# Patient Record
Sex: FEMALE | Race: WHITE | NOT HISPANIC OR LATINO | Employment: OTHER | RURAL
[De-identification: names, ages, dates, MRNs, and addresses within clinical notes are randomized per-mention and may not be internally consistent; named-entity substitution may affect disease eponyms.]

---

## 2017-02-23 ENCOUNTER — OFFICE VISIT (OUTPATIENT)
Dept: CARDIOLOGY | Facility: CLINIC | Age: 72
End: 2017-02-23

## 2017-02-23 VITALS
HEART RATE: 58 BPM | BODY MASS INDEX: 27.14 KG/M2 | WEIGHT: 159 LBS | DIASTOLIC BLOOD PRESSURE: 82 MMHG | HEIGHT: 64 IN | SYSTOLIC BLOOD PRESSURE: 150 MMHG | RESPIRATION RATE: 18 BRPM

## 2017-02-23 DIAGNOSIS — I10 ESSENTIAL HYPERTENSION: Primary | ICD-10-CM

## 2017-02-23 PROCEDURE — 99213 OFFICE O/P EST LOW 20 MIN: CPT | Performed by: INTERNAL MEDICINE

## 2017-02-23 RX ORDER — VITAMIN B COMPLEX
1 CAPSULE ORAL DAILY
COMMUNITY
End: 2020-07-09

## 2017-02-23 RX ORDER — CLONIDINE HYDROCHLORIDE 0.1 MG/1
0.1 TABLET ORAL 3 TIMES DAILY PRN
Qty: 30 TABLET | Refills: 11 | Status: SHIPPED | OUTPATIENT
Start: 2017-02-23 | End: 2019-09-20 | Stop reason: SINTOL

## 2017-02-23 NOTE — PROGRESS NOTES
Subjective:     Encounter Date:02/23/2017      Patient ID: Divine Banuelos is a 71 y.o. female.     PROBLEM LIST:  1. Hypertension:  a. History of elevated blood pressure due to stress.  b. Discontinuation of enalapril secondary to hypotension, June 2013.  2. Hypothyroidism.  3. Depression.  4. Anxiety.  5. Osteoporosis, mild.  6. Mild dyslipidemia.  7. History of Bell’s palsy.  8. Surgical history:  a. Bilateral cataract extraction.     No Known Allergies  Chief Complaint: Routine follow-up for hypertension  HPI  This is a 71-year-old female patient with a previous history of hypertension.  The patient indicates that she stop taking enalapril several years ago due to symptomatic low blood pressure.  I have reviewed her home blood pressure recordings since September 2016 to the present.  During that period of time she has had 3 episodes of systolic blood pressure greater than 140 and no episodes of diastolic blood pressure greater than 90.  She has no chest pain or shortness of breath.  There is no exertional chest arm neck jaw shoulder or back discomfort.  There is no orthopnea PND or lower extremity edema.  There is no dizziness palpitations or syncope.  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  She has no symptoms that she would attribute to her elevated blood pressure such as headache or visual changes or confusion.  She is a nonsmoker.  The remainder of her past medical history is unchanged compared to her last visit.  There has been no changes in her family history or social history.  The following portions of the patient's history were reviewed and updated as appropriate: allergies, current medications, past family history, past medical history, past social history, past surgical history and problem  Review of Systems   Constitution: Negative for chills, diaphoresis, fever, weakness, malaise/fatigue, night sweats, weight gain and weight loss.   HENT: Negative for ear  discharge, hearing loss, hoarse voice and nosebleeds.    Eyes: Negative for discharge, double vision, pain and photophobia.   Cardiovascular: Negative for chest pain, claudication, cyanosis, dyspnea on exertion, irregular heartbeat, leg swelling, near-syncope, orthopnea, palpitations, paroxysmal nocturnal dyspnea and syncope.   Respiratory: Negative for cough, hemoptysis, shortness of breath, sputum production and wheezing.    Endocrine: Negative for cold intolerance, heat intolerance, polydipsia, polyphagia and polyuria.   Hematologic/Lymphatic: Negative for adenopathy and bleeding problem. Does not bruise/bleed easily.   Skin: Negative for color change, flushing, itching and rash.   Musculoskeletal: Negative for muscle cramps, muscle weakness, myalgias and stiffness.   Gastrointestinal: Negative for abdominal pain, diarrhea, hematemesis, hematochezia, nausea and vomiting.   Genitourinary: Negative for dysuria, frequency and nocturia.   Neurological: Negative for focal weakness, loss of balance, numbness, paresthesias and seizures.   Psychiatric/Behavioral: Negative for altered mental status, hallucinations and suicidal ideas.   Allergic/Immunologic: Negative for HIV exposure, hives and persistent infections.           Current Outpatient Prescriptions:   •  aspirin 81 MG EC tablet, Take 81 mg by mouth daily., Disp: , Rfl:   •  B Complex Vitamins (VITAMIN B COMPLEX) capsule capsule, Take 1 capsule by mouth Daily., Disp: , Rfl:   •  Calcium Citrate (CITRACAL PO), Take  by mouth daily. + Magnesium, Disp: , Rfl:   •  Garlic 100 MG tablet, Take 1 tablet by mouth Daily., Disp: , Rfl:   •  levothyroxine (SYNTHROID, LEVOTHROID) 25 MCG tablet, Take 25 mcg by mouth daily., Disp: , Rfl:   •  meloxicam (MOBIC) 15 MG tablet, Take 15 mg by mouth as needed., Disp: , Rfl:   •  Misc Natural Products (OSTEO BI-FLEX TRIPLE STRENGTH PO), Take  by mouth daily., Disp: , Rfl:   •  Omega-3 Fatty Acids (FISH OIL) 1200 MG capsule capsule,  "Take 1,200 mg by mouth 2 (two) times a day., Disp: , Rfl:   •  PARoxetine (PAXIL) 10 MG tablet, Take 10 mg by mouth every morning., Disp: , Rfl:   •  TURMERIC PO, Take 1 tablet by mouth Daily., Disp: , Rfl:      Objective:     Physical Exam   Constitutional: She is oriented to person, place, and time. She appears well-developed and well-nourished.   HENT:   Head: Normocephalic and atraumatic.   Eyes: Conjunctivae are normal. No scleral icterus.   Cardiovascular: Normal rate, regular rhythm, normal heart sounds and intact distal pulses.  Exam reveals no gallop and no friction rub.    No murmur heard.  Pulmonary/Chest: Effort normal and breath sounds normal. No respiratory distress.   Abdominal: Soft. Bowel sounds are normal. There is no tenderness.   Musculoskeletal: She exhibits no edema.   Neurological: She is alert and oriented to person, place, and time.   Skin: Skin is warm and dry.   Psychiatric: She has a normal mood and affect. Her behavior is normal.     Blood pressure 150/82, pulse 58, resp. rate 18, height 64\" (162.6 cm), weight 159 lb (72.1 kg).   Lab Review:       Assessment:         1. Essential hypertension  Overall her blood pressure control is reasonably good without specific antihypertensive therapy.    Procedures     Plan:       I have recommended a strategy of keeping some blood pressure medication on hand to take for systolic blood pressures greater than 150 and her diastolic blood pressures greater than 90.  We will give her clonidine 0.1 mg every 8 hours as needed for these parameters.  She has been counseled regarding the importance of dietary sodium restriction as well as regular aerobic activity.  No cardiovascular testing is indicated at this time.         "

## 2017-03-23 ENCOUNTER — OFFICE VISIT (OUTPATIENT)
Dept: SURGERY | Facility: CLINIC | Age: 72
End: 2017-03-23

## 2017-03-23 VITALS
BODY MASS INDEX: 28.42 KG/M2 | TEMPERATURE: 98.2 F | HEIGHT: 63 IN | DIASTOLIC BLOOD PRESSURE: 78 MMHG | SYSTOLIC BLOOD PRESSURE: 126 MMHG | WEIGHT: 160.4 LBS

## 2017-03-23 DIAGNOSIS — K64.1 SECOND DEGREE HEMORRHOIDS: ICD-10-CM

## 2017-03-23 DIAGNOSIS — K62.5 RECTAL BLEED: Primary | ICD-10-CM

## 2017-03-23 PROCEDURE — 99204 OFFICE O/P NEW MOD 45 MIN: CPT | Performed by: SURGERY

## 2017-03-23 NOTE — PROGRESS NOTES
Referring Provider: No ref. provider found    Reason for Consultation: Rectal bleed     Patient Care Team:  GILBERT Lockett as PCP - General (Family Medicine)    Chief complaint   Chief Complaint   Patient presents with   • Rectal Bleeding     Pt c/o occasional rectal bleeding. No abdominal pain or constipation. Her last colon was done in 2014       Subjective .     History of present illness:  Patient presents with rectal bleeding, bright red in nature. Also felt several small hemorrhoids recently. Patient on stool softeners, a high fiber diet and increase water intake. Her last colonoscopy was 2-1/2 years ago. No significant findings at that time. No upper abdominal pain, no anemia and no weight loss. No family history of colon cancer.    Review of Systems   Constitutional: Negative for chills, fever and unexpected weight change.   HENT: Negative for hearing loss, trouble swallowing and voice change.    Eyes: Negative for visual disturbance.   Respiratory: Negative for apnea, cough, chest tightness, shortness of breath and wheezing.    Cardiovascular: Negative for chest pain, palpitations and leg swelling.   Gastrointestinal: Positive for anal bleeding. Negative for abdominal distention, abdominal pain, blood in stool, constipation, diarrhea, nausea, rectal pain and vomiting.   Endocrine: Negative for cold intolerance and heat intolerance.   Genitourinary: Negative for difficulty urinating, dysuria and flank pain.   Musculoskeletal: Negative for back pain and gait problem.   Skin: Negative for color change, rash and wound.   Neurological: Negative for dizziness, syncope, speech difficulty, weakness, light-headedness, numbness and headaches.   Hematological: Negative for adenopathy. Does not bruise/bleed easily.   Psychiatric/Behavioral: Negative for confusion. The patient is not nervous/anxious.        History  Past Medical History:   Diagnosis Date   • Anxiety    • Bell's palsy    • Depression    •  "Hyperlipidemia    • Hypertension    • Hypothyroidism    • Osteoporosis        Past Surgical History:   Procedure Laterality Date   • CATARACT EXTRACTION Bilateral    • CATARACT EXTRACTION, BILATERAL         Family History   Problem Relation Age of Onset   • Cancer Mother      skin   • Hypertension Mother    • Cancer Father      colon       Social History   Substance Use Topics   • Smoking status: Never Smoker   • Smokeless tobacco: None   • Alcohol use No        Objective     Vital Signs   /78  Temp 98.2 °F (36.8 °C)  Ht 63\" (160 cm)  Wt 160 lb 6.4 oz (72.8 kg)  BMI 28.41 kg/m2    Physical Exam:     General Appearance:    Alert, cooperative, in no acute distress   Head:    Normocephalic, without obvious abnormality, atraumatic   Eyes:            Lids and lashes normal, conjunctivae and sclerae normal, no   icterus, no pallor, corneas clear, PERRLA   Ears:    Ears appear intact with no abnormalities noted   Throat:   No oral lesions, no thrush, oral mucosa moist   Neck:   No adenopathy, supple, trachea midline, no thyromegaly, no   carotid bruit, no JVD   Back:     No kyphosis present, no scoliosis present, no skin lesions,      erythema or scars, no tenderness to percussion or                   palpation,   range of motion normal   Lungs:     Clear to auscultation,respirations regular, even and                  unlabored    Heart:    Regular rhythm and normal rate, normal S1 and S2, no            murmur, no gallop, no rub, no click   Chest Wall:    No abnormalities observed   Abdomen:     Normal bowel sounds, no masses, no organomegaly, soft        non-tender, non-distended, no guarding, no rebound                tenderness   Extremities:   Moves all extremities well, no edema, no cyanosis, no             redness   Pulses:   Pulses palpable and equal bilaterally   Skin:   No bleeding, bruising or rash   Lymph nodes:   No palpable adenopathy   Neurologic:   Cranial nerves 2 - 12 grossly intact, sensation " intact, DTR       present and equal bilaterally  Rectal-small internal hemorrhoids, no prolapse and no bleeding        Results Review:   I reviewed the patient's new clinical results.      Assessment/Plan     Patient Active Problem List   Diagnosis   • Hypertension   • Hypothyroidism   • Depression   • Anxiety   • Osteoporosis,mild   • mild Dyslipidemia   • Bell's palsy       1. Rectal bleed    2. Second degree hemorrhoids        Patient reassured, follow up in 2 years for colonoscopy. Continue to use stool softeners and a high fiber diet. Follow-up as needed otherwise    I discussed the patients findings and my recommendations with patient    Stuart Caceres MD  03/23/17      .

## 2017-03-27 ENCOUNTER — OFFICE VISIT (OUTPATIENT)
Dept: OBSTETRICS AND GYNECOLOGY | Facility: CLINIC | Age: 72
End: 2017-03-27

## 2017-03-27 VITALS
WEIGHT: 158 LBS | DIASTOLIC BLOOD PRESSURE: 54 MMHG | HEIGHT: 64 IN | SYSTOLIC BLOOD PRESSURE: 106 MMHG | BODY MASS INDEX: 26.98 KG/M2

## 2017-03-27 DIAGNOSIS — Z12.39 SCREENING FOR BREAST CANCER: Primary | ICD-10-CM

## 2017-03-27 DIAGNOSIS — N81.11 MIDLINE CYSTOCELE: ICD-10-CM

## 2017-03-27 PROCEDURE — 57160 INSERT PESSARY/OTHER DEVICE: CPT | Performed by: PHYSICIAN ASSISTANT

## 2017-03-27 PROCEDURE — 99213 OFFICE O/P EST LOW 20 MIN: CPT | Performed by: PHYSICIAN ASSISTANT

## 2017-03-27 NOTE — PROGRESS NOTES
"Subjective   Chief Complaint   Patient presents with   • Follow-up     order for mammogram   • Pessary Check     size 2     /54  Ht 63.5\" (161.3 cm)  Wt 158 lb (71.7 kg)  LMP  (LMP Unknown)  BMI 27.55 kg/m2   Divine Banuelos is a 71 y.o. year old  presenting to be seen for pessary issues  She was seen initially one year ago and fitted per Dr Kennedy with size 2 ring with support--she states initially she felt it did pretty well but did seem to come down a lot--she wore it off and on until she removed 2 months ago because it seemed to stay down low all the time and was uncomfortable  She desires a different pessary now--wants a size larger--  She is voiding well  Requests order for mammogram as well    Past Medical History:   Diagnosis Date   • Anxiety    • Arthritis    • Bell's palsy    • Depression    • Hyperlipidemia    • Hypertension    • Hypothyroidism    • Osteoporosis         Current Outpatient Prescriptions:   •  aspirin 81 MG EC tablet, Take 81 mg by mouth daily., Disp: , Rfl:   •  B Complex Vitamins (VITAMIN B COMPLEX) capsule capsule, Take 1 capsule by mouth Daily., Disp: , Rfl:   •  Calcium Citrate (CITRACAL PO), Take  by mouth daily. + Magnesium, Disp: , Rfl:   •  Garlic 100 MG tablet, Take 1 tablet by mouth Daily., Disp: , Rfl:   •  levothyroxine (SYNTHROID, LEVOTHROID) 25 MCG tablet, Take 25 mcg by mouth daily., Disp: , Rfl:   •  meloxicam (MOBIC) 15 MG tablet, Take 15 mg by mouth as needed., Disp: , Rfl:   •  Misc Natural Products (OSTEO BI-FLEX TRIPLE STRENGTH PO), Take  by mouth daily., Disp: , Rfl:   •  Omega-3 Fatty Acids (FISH OIL) 1200 MG capsule capsule, Take 1,200 mg by mouth 2 (two) times a day., Disp: , Rfl:   •  PARoxetine (PAXIL) 10 MG tablet, Take 10 mg by mouth every morning., Disp: , Rfl:   •  TURMERIC PO, Take 1 tablet by mouth Daily., Disp: , Rfl:   •  CloNIDine (CATAPRES) 0.1 MG tablet, Take 1 tablet by mouth 3 (Three) Times a Day As Needed for high blood pressure " (SBP> 150 and/or DBP>90)., Disp: 30 tablet, Rfl: 11  •  Misc. Devices (GELLHORN PESSARY) misc, Use as directed (Size 3), Disp: 1 each, Rfl: 0   No Known Allergies   Past Surgical History:   Procedure Laterality Date   • CATARACT EXTRACTION Bilateral    • CATARACT EXTRACTION, BILATERAL     • RETINAL DETACHMENT REPAIR        Social History     Social History   • Marital status:      Spouse name: N/A   • Number of children: N/A   • Years of education: N/A     Occupational History   • Not on file.     Social History Main Topics   • Smoking status: Never Smoker   • Smokeless tobacco: Not on file   • Alcohol use No   • Drug use: No   • Sexual activity: Defer     Other Topics Concern   • Not on file     Social History Narrative      Family History   Problem Relation Age of Onset   • Cancer Mother      skin   • Hypertension Mother    • Cancer Father      colon   • Colon cancer Paternal Grandmother    • Colon cancer Paternal Uncle        The following portions of the patient's history were reviewed and updated as appropriate:problem list, current medications, allergies, past family history, past medical history, past social history and past surgical history.    Review of Systems   Constitutional: Negative.    HENT: Positive for hearing loss.    Gastrointestinal: Negative.    Genitourinary: Negative.  Negative for difficulty urinating, pelvic pain and vaginal bleeding.        Objective     Physical Exam   Constitutional: She appears well-developed and well-nourished.   Abdominal: Soft. Normal appearance. She exhibits no distension and no mass. There is no tenderness.   Genitourinary: Uterus normal. There is no tenderness or lesion on the right labia. There is no tenderness or lesion on the left labia. Cervix exhibits no motion tenderness and no discharge. Right adnexum displays no mass and no tenderness. Left adnexum displays no mass and no tenderness. No erythema, tenderness or bleeding in the vagina. No vaginal  discharge found.   Genitourinary Comments: 2 plus cystocele   Skin: Skin is warm, dry and intact.   Psychiatric: She has a normal mood and affect. Her behavior is normal.     Divine was seen today for follow-up and pessary check.    Diagnoses and all orders for this visit:    Screening for breast cancer  -     Mammo Screening Bilateral With CAD    Midline cystocele             This note was electronically signed.    Denise Hernandez PA-C   March 27, 2017

## 2017-03-27 NOTE — PATIENT INSTRUCTIONS
rx for ring pessary with support size 3  rto for insertion if desires  If she wants to insert pessary at home that is ok and will follow up 2 months or prn

## 2017-03-28 NOTE — PROGRESS NOTES
I have reviewed the notes, assessments, and/or procedures performed by Sonali Hernandez PA-C, I concur with her/his documentation of Divine Banuelos.

## 2017-04-04 ENCOUNTER — HOSPITAL ENCOUNTER (OUTPATIENT)
Dept: GENERAL RADIOLOGY | Age: 72
Discharge: HOME | End: 2017-04-04
Attending: PHYSICIAN ASSISTANT | Admitting: PHYSICIAN ASSISTANT

## 2017-04-04 DIAGNOSIS — Z12.31 ENCOUNTER FOR SCREENING MAMMOGRAM FOR BREAST CANCER: ICD-10-CM

## 2017-06-07 ENCOUNTER — OFFICE VISIT (OUTPATIENT)
Dept: NEUROLOGY | Facility: CLINIC | Age: 72
End: 2017-06-07

## 2017-06-07 VITALS
HEIGHT: 63 IN | WEIGHT: 155 LBS | SYSTOLIC BLOOD PRESSURE: 141 MMHG | OXYGEN SATURATION: 99 % | DIASTOLIC BLOOD PRESSURE: 68 MMHG | HEART RATE: 59 BPM | BODY MASS INDEX: 27.46 KG/M2

## 2017-06-07 DIAGNOSIS — G44.209 TENSION HEADACHE: ICD-10-CM

## 2017-06-07 DIAGNOSIS — M54.2 NECK PAIN: ICD-10-CM

## 2017-06-07 DIAGNOSIS — F41.9 ANXIETY: Primary | ICD-10-CM

## 2017-06-07 DIAGNOSIS — R20.2 PARESTHESIA: ICD-10-CM

## 2017-06-07 PROCEDURE — 99204 OFFICE O/P NEW MOD 45 MIN: CPT | Performed by: PSYCHIATRY & NEUROLOGY

## 2017-06-07 RX ORDER — PAROXETINE HYDROCHLORIDE 20 MG/1
20 TABLET, FILM COATED ORAL EVERY MORNING
Qty: 30 TABLET | Refills: 5 | Status: SHIPPED | OUTPATIENT
Start: 2017-06-07 | End: 2017-06-26 | Stop reason: SDUPTHER
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
## 2017-06-26 DIAGNOSIS — F41.9 ANXIETY: ICD-10-CM

## 2017-06-26 RX ORDER — PAROXETINE HYDROCHLORIDE 20 MG/1
20 TABLET, FILM COATED ORAL EVERY MORNING
Qty: 30 TABLET | Refills: 5 | Status: SHIPPED | OUTPATIENT
Start: 2017-06-26 | End: 2017-10-09 | Stop reason: SDUPTHER

## 2017-07-21 ENCOUNTER — HOSPITAL ENCOUNTER (OUTPATIENT)
Dept: MRI IMAGING | Age: 72
Discharge: HOME | End: 2017-07-21
Attending: PSYCHIATRY & NEUROLOGY | Admitting: PSYCHIATRY & NEUROLOGY

## 2017-07-21 DIAGNOSIS — G44.209 TENSION-TYPE HEADACHE, NOT INTRACTABLE: ICD-10-CM

## 2017-07-21 DIAGNOSIS — G44.209 TENSION HEADACHE: ICD-10-CM

## 2017-07-21 DIAGNOSIS — R20.2 PARESTHESIA: ICD-10-CM

## 2017-07-28 ENCOUNTER — TELEPHONE (OUTPATIENT)
Dept: NEUROLOGY | Facility: CLINIC | Age: 72
End: 2017-07-28

## 2017-10-09 ENCOUNTER — OFFICE VISIT (OUTPATIENT)
Dept: NEUROLOGY | Facility: CLINIC | Age: 72
End: 2017-10-09

## 2017-10-09 VITALS
HEART RATE: 53 BPM | BODY MASS INDEX: 27.64 KG/M2 | DIASTOLIC BLOOD PRESSURE: 64 MMHG | SYSTOLIC BLOOD PRESSURE: 128 MMHG | HEIGHT: 63 IN | WEIGHT: 156 LBS

## 2017-10-09 DIAGNOSIS — M54.2 NECK PAIN: Primary | ICD-10-CM

## 2017-10-09 DIAGNOSIS — F41.9 ANXIETY: ICD-10-CM

## 2017-10-09 PROCEDURE — 99213 OFFICE O/P EST LOW 20 MIN: CPT | Performed by: PSYCHIATRY & NEUROLOGY

## 2017-10-09 RX ORDER — MELOXICAM 15 MG/1
15 TABLET ORAL AS NEEDED
Qty: 30 TABLET | Refills: 11 | Status: SHIPPED | OUTPATIENT
Start: 2017-10-09 | End: 2019-03-05 | Stop reason: SDUPTHER

## 2017-10-09 RX ORDER — PAROXETINE HYDROCHLORIDE 20 MG/1
20 TABLET, FILM COATED ORAL EVERY MORNING
Qty: 90 TABLET | Refills: 3 | Status: SHIPPED | OUTPATIENT
Start: 2017-10-09 | End: 2018-11-07 | Stop reason: SDUPTHER

## 2017-10-09 NOTE — PROGRESS NOTES
Subjective:     Patient ID: Divine Banuelos is a 71 y.o. female.    CC: No chief complaint on file.      HPI:   History of Present Illness  The following portions of the patient's history were reviewed and updated as appropriate: allergies, current medications, past family history, past medical history, past social history, past surgical history and problem list.     Numbness and tension headaches have improved, has occasional scalp tenderness, neck popping. She feels better since she has improved with Paxil. She gets chiropractic adjustments for neck pain. MRI of brain was normal for age. 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    Past Medical History:   Diagnosis Date   • Anxiety    • Arthritis    • Bell's palsy    • Depression    • Hyperlipidemia    • Hypertension    • Hypothyroidism    • Osteoporosis        Past Surgical History:   Procedure Laterality Date   • CATARACT EXTRACTION Bilateral    • CATARACT EXTRACTION, BILATERAL     • RETINAL DETACHMENT REPAIR         Social History     Social History   • Marital status:      Spouse name: N/A   • Number of children: N/A   • Years of education: N/A     Occupational History   • Not on file.     Social History Main Topics   • Smoking status: Never Smoker   • Smokeless tobacco: Not on file   • Alcohol use No   • Drug use: No   • Sexual activity: Defer     Other Topics Concern   • Not on file     Social History Narrative       Family History   Problem Relation Age of Onset   • Cancer Mother      skin   • Hypertension Mother    • Cancer Father      colon   • Colon cancer Paternal Grandmother    • Colon cancer Paternal Uncle         Review of Systems   Constitutional: Negative for chills, fatigue, fever and unexpected weight change.   HENT: Negative for ear pain, hearing loss, nosebleeds, rhinorrhea and sore throat.    Eyes: Positive for visual disturbance. Negative for photophobia, pain, discharge  and itching.   Respiratory: Negative for cough, chest tightness, shortness of breath and wheezing.    Cardiovascular: Negative for chest pain, palpitations and leg swelling.   Gastrointestinal: Negative for abdominal pain, blood in stool, constipation, diarrhea, nausea and vomiting.   Genitourinary: Negative for dysuria, frequency, hematuria and urgency.   Musculoskeletal: Positive for back pain, myalgias and neck stiffness. Negative for arthralgias, gait problem, joint swelling and neck pain.   Skin: Negative for rash and wound.   Allergic/Immunologic: Negative for environmental allergies and food allergies.   Neurological: Negative for dizziness, tremors, seizures, syncope, speech difficulty, weakness, light-headedness, numbness and headaches.   Hematological: Negative for adenopathy. Does not bruise/bleed easily.   Psychiatric/Behavioral: Positive for agitation, decreased concentration and sleep disturbance. Negative for confusion, hallucinations and suicidal ideas. The patient is nervous/anxious.         Objective:    Neurologic Exam     Mental Status   Oriented to person, place, and time.       Physical Exam   Constitutional: She is oriented to person, place, and time. She appears well-developed and well-nourished.   Cardiovascular: Normal rate and regular rhythm.    Pulmonary/Chest: Effort normal.   Neurological: She is alert and oriented to person, place, and time. She has normal reflexes.   Psychiatric: She has a normal mood and affect. Her behavior is normal. Thought content normal.       Assessment/Plan:       Diagnoses and all orders for this visit:    Neck pain  -     meloxicam (MOBIC) 15 MG tablet; Take 1 tablet by mouth As Needed for Moderate Pain .    Anxiety  -     PARoxetine (PAXIL) 20 MG tablet; Take 1 tablet by mouth Every Morning.       She is encouraged to call for problems, new concerns.    Shawn Juárez MD  10/9/2017

## 2017-10-12 ENCOUNTER — HOSPITAL ENCOUNTER (OUTPATIENT)
Dept: OTHER | Age: 72
Discharge: HOME | End: 2017-10-12
Attending: INTERNAL MEDICINE | Admitting: INTERNAL MEDICINE

## 2017-10-12 ENCOUNTER — OFFICE VISIT (OUTPATIENT)
Dept: CARDIOLOGY | Facility: CLINIC | Age: 72
End: 2017-10-12

## 2017-10-12 VITALS
BODY MASS INDEX: 28.01 KG/M2 | SYSTOLIC BLOOD PRESSURE: 138 MMHG | HEART RATE: 56 BPM | DIASTOLIC BLOOD PRESSURE: 70 MMHG | WEIGHT: 158.1 LBS | HEIGHT: 63 IN

## 2017-10-12 DIAGNOSIS — R00.1 BRADYCARDIA: ICD-10-CM

## 2017-10-12 DIAGNOSIS — I10 ESSENTIAL HYPERTENSION: Primary | ICD-10-CM

## 2017-10-12 PROCEDURE — 93000 ELECTROCARDIOGRAM COMPLETE: CPT | Performed by: INTERNAL MEDICINE

## 2017-10-12 PROCEDURE — 99213 OFFICE O/P EST LOW 20 MIN: CPT | Performed by: INTERNAL MEDICINE

## 2017-10-12 NOTE — PROGRESS NOTES
"[facility]  Office visit  Divine Banuelos  1945  [phone]    VISIT DATE:  10/12/2017    PCP: Queenie Amezcua, APRN  [address] [facility] [address]    CC:  Chief Complaint   Patient presents with   • Hypertension       PROBLEM LIST:  1. Hypertension:  a.  History of elevated blood pressure due to stress.  b. Discontinuation of enalapril secondary to hypotension, June 2013.  2.  Hypothyroidism.  3. Depression.  4. Anxiety.  5. Osteoporosis, mild.  6. Mild dyslipidemia.  7. History of Bell’s palsy.  8. Surgical history:  a. Bilateral cataract extraction.    ASSESSMENT:   Diagnosis Plan   1. Essential hypertension     2. Bradycardia         PLAN:  Continue as needed clonidine for blood pressures greater than 150/100 mmHg  Holter monitor to evaluate for symptom rhythm correlation with episodes of atypical lightheadedness    Subjective  Blood pressures are predominantly running less than 140/90 mmHg.  She is not required any as needed clonidine.  Denies chest pain, palpitations or dyspnea.  Does report intermittent fuzzy headed sensation and shooting right-sided pains in her head.  She has undergone an MRI which revealed only focal scattered white matter disease.  Appears to be an active individual without any limitations.  Heart rates at home running in the 44-55 be permitted range.    PHYSICAL EXAMINATION:  Vitals:    10/12/17 1151   BP: 138/70   BP Location: Left arm   Patient Position: Sitting   Pulse: 56   Weight: 158 lb 1.6 oz (71.7 kg)   Height: 63\" (160 cm)     General Appearance:    Alert, cooperative, no distress, appears stated age   Head:    Normocephalic, without obvious abnormality, atraumatic   Eyes:    conjunctiva/corneas clear   Nose:   Nares normal, septum midline, mucosa normal, no drainage   Throat:   Lips, teeth and gums normal   Neck:   Supple, symmetrical, trachea midline, no carotid    bruit or JVD   Lungs:     Clear to auscultation bilaterally, respirations " unlabored   Chest Wall:    No tenderness or deformity    Heart:    Regular rate and rhythm, S1 and S2 normal, no murmur, rub   or gallop, normal carotid impulse bilaterally without bruit.   Abdomen:     Soft, non-tender   Extremities:   Extremities normal, atraumatic, no cyanosis or edema   Pulses:   2+ and symmetric all extremities   Skin:   Skin color, texture, turgor normal, no rashes or lesions       Diagnostic Data:    ECG 12 Lead  Date/Time: 10/12/2017 12:09 PM  Performed by: ALONSO IVEY III  Authorized by: ALONSO IVEY III   Rhythm: sinus bradycardia  Conduction: 1st degree  Clinical impression: abnormal ECG          No results found for: CHLPL, TRIG, HDL, LDLDIRECT  No results found for: GLUCOSE, BUN, CREATININE, NA, K, CL, CO2, CREATININE, BUN  No results found for: HGBA1C  No results found for: WBC, HGB, HCT, PLT    Allergies  No Known Allergies    Current Medications    Current Outpatient Prescriptions:   •  aspirin 81 MG EC tablet, Take 81 mg by mouth daily., Disp: , Rfl:   •  B Complex Vitamins (VITAMIN B COMPLEX) capsule capsule, Take 1 capsule by mouth Daily., Disp: , Rfl:   •  Calcium Citrate (CITRACAL PO), Take  by mouth daily. + Magnesium, Disp: , Rfl:   •  CloNIDine (CATAPRES) 0.1 MG tablet, Take 1 tablet by mouth 3 (Three) Times a Day As Needed for high blood pressure (SBP> 150 and/or DBP>90)., Disp: 30 tablet, Rfl: 11  •  Garlic 100 MG tablet, Take 1 tablet by mouth Daily., Disp: , Rfl:   •  levothyroxine (SYNTHROID, LEVOTHROID) 25 MCG tablet, Take 25 mcg by mouth daily., Disp: , Rfl:   •  meloxicam (MOBIC) 15 MG tablet, Take 1 tablet by mouth As Needed for Moderate Pain ., Disp: 30 tablet, Rfl: 11  •  Misc Natural Products (OSTEO BI-FLEX TRIPLE STRENGTH PO), Take  by mouth daily., Disp: , Rfl:   •  Misc. Devices (GELLHORN PESSARY) misc, Use as directed (Size 3), Disp: 1 each, Rfl: 0  •  Omega-3 Fatty Acids (FISH OIL) 1200 MG capsule capsule, Take 1,200 mg by mouth 2 (two) times a day., Disp:  , Rfl:   •  PARoxetine (PAXIL) 20 MG tablet, Take 1 tablet by mouth Every Morning., Disp: 90 tablet, Rfl: 3          ROS  Review of Systems   Cardiovascular: Positive for irregular heartbeat. Negative for chest pain, dyspnea on exertion and near-syncope.   Respiratory: Negative for shortness of breath.        SOCIAL HX  Social History     Social History   • Marital status:      Spouse name: N/A   • Number of children: N/A   • Years of education: N/A     Occupational History   • Not on file.     Social History Main Topics   • Smoking status: Never Smoker   • Smokeless tobacco: Never Used   • Alcohol use No   • Drug use: No   • Sexual activity: Defer     Other Topics Concern   • Not on file     Social History Narrative       FAMILY HX  Family History   Problem Relation Age of Onset   • Cancer Mother      skin   • Hypertension Mother    • Cancer Father      colon   • Colon cancer Paternal Grandmother    • Colon cancer Paternal Uncle              Reinaldo Cope III, MD, FACC

## 2017-11-25 DIAGNOSIS — F41.9 ANXIETY: ICD-10-CM

## 2017-11-27 RX ORDER — PAROXETINE HYDROCHLORIDE 20 MG/1
TABLET, FILM COATED ORAL
Qty: 30 TABLET | Refills: 5 | Status: SHIPPED | OUTPATIENT
Start: 2017-11-27 | End: 2018-08-21 | Stop reason: SDDI

## 2017-12-14 ENCOUNTER — HOSPITAL ENCOUNTER (OUTPATIENT)
Dept: GENERAL RADIOLOGY | Age: 72
Discharge: HOME | End: 2017-12-14
Attending: NURSE PRACTITIONER | Admitting: NURSE PRACTITIONER

## 2017-12-14 DIAGNOSIS — E04.9 GOITER: ICD-10-CM

## 2018-04-13 ENCOUNTER — OFFICE VISIT (OUTPATIENT)
Dept: OBSTETRICS AND GYNECOLOGY | Facility: CLINIC | Age: 73
End: 2018-04-13

## 2018-04-13 VITALS
WEIGHT: 159 LBS | DIASTOLIC BLOOD PRESSURE: 74 MMHG | BODY MASS INDEX: 28.17 KG/M2 | SYSTOLIC BLOOD PRESSURE: 132 MMHG | HEIGHT: 63 IN

## 2018-04-13 DIAGNOSIS — N81.11 CYSTOCELE, MIDLINE: ICD-10-CM

## 2018-04-13 DIAGNOSIS — Z46.89 ENCOUNTER FOR PESSARY MAINTENANCE: ICD-10-CM

## 2018-04-13 DIAGNOSIS — R30.0 BURNING WITH URINATION: Primary | ICD-10-CM

## 2018-04-13 LAB
BILIRUB BLD-MCNC: NEGATIVE MG/DL
CLARITY, POC: ABNORMAL
COLOR UR: ABNORMAL
GLUCOSE UR STRIP-MCNC: NEGATIVE MG/DL
KETONES UR QL: NEGATIVE
LEUKOCYTE EST, POC: ABNORMAL
NITRITE UR-MCNC: NEGATIVE MG/ML
PH UR: 6 [PH] (ref 5–8)
PROT UR STRIP-MCNC: ABNORMAL MG/DL
RBC # UR STRIP: ABNORMAL /UL
SP GR UR: 1.01 (ref 1–1.03)
UROBILINOGEN UR QL: NORMAL

## 2018-04-13 PROCEDURE — 81003 URINALYSIS AUTO W/O SCOPE: CPT | Performed by: PHYSICIAN ASSISTANT

## 2018-04-13 PROCEDURE — 99213 OFFICE O/P EST LOW 20 MIN: CPT | Performed by: PHYSICIAN ASSISTANT

## 2018-04-13 RX ORDER — PHENAZOPYRIDINE HYDROCHLORIDE 100 MG/1
100 TABLET, FILM COATED ORAL 3 TIMES DAILY PRN
Qty: 15 TABLET | Refills: 0 | Status: SHIPPED | OUTPATIENT
Start: 2018-04-13 | End: 2018-08-21 | Stop reason: SDDI

## 2018-04-13 RX ORDER — SULFAMETHOXAZOLE AND TRIMETHOPRIM 800; 160 MG/1; MG/1
1 TABLET ORAL 2 TIMES DAILY
Qty: 6 TABLET | Refills: 0 | Status: SHIPPED | OUTPATIENT
Start: 2018-04-13 | End: 2018-08-21

## 2018-04-13 NOTE — PROGRESS NOTES
Subjective   Chief Complaint   Patient presents with   • Follow-up     Needs pessary put back in; burning with urination       Divine Banuelos is a 72 y.o. year old  presenting to be seen for complaints of dysuria and painful urination that started 3-4 days ago  Patient took her pessary out about one month ago and left out but she desires reinsertion today  She normally has been removing and reinserting pessary with no problems but states was more difficult to get in last time and wants me to insert today  No CVA pain, no fever or chills  UA noted    Past Medical History:   Diagnosis Date   • Anxiety    • Arthritis    • Bell's palsy    • Depression    • Hyperlipidemia    • Hypertension    • Hypothyroidism    • Osteoporosis         Current Outpatient Prescriptions:   •  aspirin 81 MG EC tablet, Take 81 mg by mouth daily., Disp: , Rfl:   •  B Complex Vitamins (VITAMIN B COMPLEX) capsule capsule, Take 1 capsule by mouth Daily., Disp: , Rfl:   •  Calcium Citrate (CITRACAL PO), Take  by mouth daily. + Magnesium, Disp: , Rfl:   •  CloNIDine (CATAPRES) 0.1 MG tablet, Take 1 tablet by mouth 3 (Three) Times a Day As Needed for high blood pressure (SBP> 150 and/or DBP>90)., Disp: 30 tablet, Rfl: 11  •  Garlic 100 MG tablet, Take 1 tablet by mouth Daily., Disp: , Rfl:   •  levothyroxine (SYNTHROID, LEVOTHROID) 25 MCG tablet, Take 25 mcg by mouth daily., Disp: , Rfl:   •  meloxicam (MOBIC) 15 MG tablet, Take 1 tablet by mouth As Needed for Moderate Pain ., Disp: 30 tablet, Rfl: 11  •  Misc Natural Products (OSTEO BI-FLEX TRIPLE STRENGTH PO), Take  by mouth daily., Disp: , Rfl:   •  Misc. Devices (GELLHORN PESSARY) misc, Use as directed (Size 3), Disp: 1 each, Rfl: 0  •  Omega-3 Fatty Acids (FISH OIL) 1200 MG capsule capsule, Take 1,200 mg by mouth 2 (two) times a day., Disp: , Rfl:   •  PARoxetine (PAXIL) 20 MG tablet, Take 1 tablet by mouth Every Morning., Disp: 90 tablet, Rfl: 3  •  PARoxetine (PAXIL) 20 MG tablet, TAKE  "ONE TABLET BY MOUTH ONCE DAILY IN THE MORNING, Disp: 30 tablet, Rfl: 5  •  phenazopyridine (PYRIDIUM) 100 MG tablet, Take 1 tablet by mouth 3 (Three) Times a Day As Needed for bladder spasms., Disp: 15 tablet, Rfl: 0  •  sulfamethoxazole-trimethoprim (BACTRIM DS) 800-160 MG per tablet, Take 1 tablet by mouth 2 (Two) Times a Day., Disp: 6 tablet, Rfl: 0   No Known Allergies   Past Surgical History:   Procedure Laterality Date   • CATARACT EXTRACTION Bilateral    • CATARACT EXTRACTION, BILATERAL     • RETINAL DETACHMENT REPAIR        Social History     Social History   • Marital status:      Spouse name: N/A   • Number of children: N/A   • Years of education: N/A     Occupational History   • Not on file.     Social History Main Topics   • Smoking status: Never Smoker   • Smokeless tobacco: Never Used   • Alcohol use No   • Drug use: No   • Sexual activity: Defer     Other Topics Concern   • Not on file     Social History Narrative   • No narrative on file      Family History   Problem Relation Age of Onset   • Cancer Mother      skin   • Hypertension Mother    • Cancer Father      colon   • Colon cancer Paternal Grandmother    • Colon cancer Paternal Uncle        Review of Systems   Constitutional: Negative.    Gastrointestinal: Negative.    Genitourinary: Positive for dysuria. Negative for hematuria and vaginal bleeding.           Objective   /74   Ht 160 cm (63\")   Wt 72.1 kg (159 lb)   LMP  (LMP Unknown)   Breastfeeding? No   BMI 28.17 kg/m²     Physical Exam   Constitutional: She appears well-developed and well-nourished. She is cooperative.   Abdominal: Soft. Normal appearance. There is no tenderness.   Genitourinary: There is no tenderness or lesion on the right labia. There is no tenderness or lesion on the left labia.   Genitourinary Comments: 1-2 plus cystocele  size 3 ring with support pessary reinserted   Neurological: She is alert.   Skin: Skin is warm and dry.   Psychiatric: She has a " normal mood and affect. Her behavior is normal.            Assessment and Plan  Divine was seen today for follow-up.    Diagnoses and all orders for this visit:    Burning with urination  -     POC Urinalysis Dipstick, Automated  -     Urine Culture - , Urine, Clean Catch    Cystocele, midline    Encounter for pessary maintenance    Other orders  -     sulfamethoxazole-trimethoprim (BACTRIM DS) 800-160 MG per tablet; Take 1 tablet by mouth 2 (Two) Times a Day.  -     phenazopyridine (PYRIDIUM) 100 MG tablet; Take 1 tablet by mouth 3 (Three) Times a Day As Needed for bladder spasms.      Patient Instructions   Will send urine for culture-  Start Bactrim DS today  Will contact Monday with culture results               This note was electronically signed.    Denise Hernandez PA-C   April 13, 2018

## 2018-04-18 LAB
BACTERIA UR CULT: ABNORMAL
BACTERIA UR CULT: ABNORMAL
OTHER ANTIBIOTIC SUSC ISLT: ABNORMAL

## 2018-05-16 ENCOUNTER — HOSPITAL ENCOUNTER (OUTPATIENT)
Dept: OTHER | Age: 73
Discharge: HOME | End: 2018-05-16
Attending: NURSE PRACTITIONER | Admitting: NURSE PRACTITIONER

## 2018-05-16 LAB
A/G RATIO: 1.8 (ref 0.8–2)
ALBUMIN SERPL-MCNC: 4.4 G/DL (ref 3.4–4.8)
ALP BLD-CCNC: 85 U/L (ref 25–100)
ALT SERPL-CCNC: 17 U/L (ref 4–36)
ANION GAP SERPL CALCULATED.3IONS-SCNC: 14 MMOL/L (ref 3–16)
AST SERPL-CCNC: 21 U/L (ref 8–33)
BASOPHILS ABSOLUTE: 0 K/UL (ref 0–0.1)
BASOPHILS RELATIVE PERCENT: 0.6 %
BILIRUB SERPL-MCNC: 0.4 MG/DL (ref 0.3–1.2)
BUN BLDV-MCNC: 14 MG/DL (ref 6–20)
CALCIUM SERPL-MCNC: 9.6 MG/DL (ref 8.5–10.5)
CHLORIDE BLD-SCNC: 103 MMOL/L (ref 98–107)
CO2: 25 MMOL/L (ref 20–30)
CREAT SERPL-MCNC: 0.9 MG/DL (ref 0.4–1.2)
EOSINOPHILS ABSOLUTE: 0.1 K/UL (ref 0–0.4)
EOSINOPHILS RELATIVE PERCENT: 2.6 %
FOLATE: >20 NG/ML
GFR AFRICAN AMERICAN: >59
GFR NON-AFRICAN AMERICAN: >60
GLOBULIN: 2.5 G/DL
GLUCOSE BLD-MCNC: 104 MG/DL (ref 74–106)
HCT VFR BLD CALC: 43.9 % (ref 37–47)
HEMOGLOBIN: 13.5 G/DL (ref 11.5–16.5)
IMMATURE GRANULOCYTES #: 0 K/UL
IMMATURE GRANULOCYTES %: 0.4 % (ref 0–5)
LYMPHOCYTES ABSOLUTE: 1.5 K/UL (ref 1.5–4)
LYMPHOCYTES RELATIVE PERCENT: 27.6 %
MCH RBC QN AUTO: 28.8 PG (ref 27–32)
MCHC RBC AUTO-ENTMCNC: 30.8 G/DL (ref 31–35)
MCV RBC AUTO: 93.6 FL (ref 80–100)
MONOCYTES ABSOLUTE: 0.5 K/UL (ref 0.2–0.8)
MONOCYTES RELATIVE PERCENT: 8.5 %
NEUTROPHILS ABSOLUTE: 3.3 K/UL (ref 2–7.5)
NEUTROPHILS RELATIVE PERCENT: 60.3 %
PDW BLD-RTO: 13.4 % (ref 11–16)
PLATELET # BLD: 320 K/UL (ref 150–400)
PMV BLD AUTO: 10.3 FL (ref 6–10)
POTASSIUM SERPL-SCNC: 4.4 MMOL/L (ref 3.4–5.1)
RBC # BLD: 4.69 M/UL (ref 3.8–5.8)
SODIUM BLD-SCNC: 142 MMOL/L (ref 136–145)
TOTAL PROTEIN: 6.9 G/DL (ref 6.4–8.3)
TSH SERPL DL<=0.05 MIU/L-ACNC: 5.1 UIU/ML (ref 0.35–5.5)
VITAMIN B-12: 572 PG/ML (ref 211–911)
VITAMIN D 25-HYDROXY: 42.9 (ref 32–100)
WBC # BLD: 5.4 K/UL (ref 4–11)

## 2018-05-21 ENCOUNTER — TRANSCRIBE ORDERS (OUTPATIENT)
Dept: ADMINISTRATIVE | Facility: HOSPITAL | Age: 73
End: 2018-05-21

## 2018-05-21 DIAGNOSIS — F68.8 OTHER SPECIFIED DISORDERS OF ADULT PERSONALITY AND BEHAVIOR: Primary | ICD-10-CM

## 2018-05-24 ENCOUNTER — HOSPITAL ENCOUNTER (OUTPATIENT)
Dept: MRI IMAGING | Facility: HOSPITAL | Age: 73
Discharge: HOME | End: 2018-05-24
Admitting: NURSE PRACTITIONER

## 2018-05-24 DIAGNOSIS — F68.8 OTHER SPECIFIED DISORDERS OF ADULT PERSONALITY AND BEHAVIOR: ICD-10-CM

## 2018-05-24 PROCEDURE — 70551 MRI BRAIN STEM W/O DYE: CPT

## 2018-06-12 ENCOUNTER — TRANSCRIBE ORDERS (OUTPATIENT)
Dept: MAMMOGRAPHY | Facility: HOSPITAL | Age: 73
End: 2018-06-12

## 2018-06-13 ENCOUNTER — TRANSCRIBE ORDERS (OUTPATIENT)
Dept: ADMINISTRATIVE | Facility: HOSPITAL | Age: 73
End: 2018-06-13

## 2018-06-13 DIAGNOSIS — Z12.39 SCREENING BREAST EXAMINATION: Primary | ICD-10-CM

## 2018-06-14 ENCOUNTER — TRANSCRIBE ORDERS (OUTPATIENT)
Dept: ADMINISTRATIVE | Facility: HOSPITAL | Age: 73
End: 2018-06-14

## 2018-06-14 DIAGNOSIS — N64.4 MASTODYNIA: Primary | ICD-10-CM

## 2018-06-21 ENCOUNTER — HOSPITAL ENCOUNTER (OUTPATIENT)
Dept: MAMMOGRAPHY | Facility: HOSPITAL | Age: 73
Discharge: HOME | End: 2018-06-21
Admitting: NURSE PRACTITIONER

## 2018-06-21 DIAGNOSIS — N64.4 MASTODYNIA: ICD-10-CM

## 2018-06-21 PROCEDURE — G0279 TOMOSYNTHESIS, MAMMO: HCPCS

## 2018-06-21 PROCEDURE — 77066 DX MAMMO INCL CAD BI: CPT

## 2018-07-10 ENCOUNTER — HOSPITAL ENCOUNTER (OUTPATIENT)
Dept: GENERAL RADIOLOGY | Facility: HOSPITAL | Age: 73
Discharge: HOME | End: 2018-07-10
Admitting: NURSE PRACTITIONER

## 2018-07-10 ENCOUNTER — TRANSCRIBE ORDERS (OUTPATIENT)
Dept: ADMINISTRATIVE | Facility: HOSPITAL | Age: 73
End: 2018-07-10

## 2018-07-10 ENCOUNTER — HOSPITAL ENCOUNTER (OUTPATIENT)
Dept: GENERAL RADIOLOGY | Facility: HOSPITAL | Age: 73
Discharge: HOME | End: 2018-07-10

## 2018-07-10 DIAGNOSIS — M79.672 LEFT FOOT PAIN: ICD-10-CM

## 2018-07-10 DIAGNOSIS — M79.672 LEFT FOOT PAIN: Primary | ICD-10-CM

## 2018-07-10 DIAGNOSIS — R22.42 MASS OF LOWER LEG, LEFT: ICD-10-CM

## 2018-07-10 PROCEDURE — 73600 X-RAY EXAM OF ANKLE: CPT

## 2018-07-10 PROCEDURE — 73630 X-RAY EXAM OF FOOT: CPT

## 2018-08-21 ENCOUNTER — OFFICE VISIT (OUTPATIENT)
Dept: CARDIOLOGY | Facility: CLINIC | Age: 73
End: 2018-08-21

## 2018-08-21 VITALS
SYSTOLIC BLOOD PRESSURE: 120 MMHG | HEIGHT: 63 IN | HEART RATE: 54 BPM | WEIGHT: 158.4 LBS | DIASTOLIC BLOOD PRESSURE: 80 MMHG | BODY MASS INDEX: 28.07 KG/M2

## 2018-08-21 DIAGNOSIS — I10 ESSENTIAL HYPERTENSION: Primary | ICD-10-CM

## 2018-08-21 DIAGNOSIS — R00.1 BRADYCARDIA: ICD-10-CM

## 2018-08-21 PROCEDURE — 99213 OFFICE O/P EST LOW 20 MIN: CPT | Performed by: INTERNAL MEDICINE

## 2018-08-21 NOTE — PROGRESS NOTES
"[facility]  Office visit  Divine Banuelos  1945  [phone]    VISIT DATE:  10/12/2017    PCP: Queenie Amezcua, APRN  [address] [facility] [address]    CC:  Chief Complaint   Patient presents with   • Hypertension   • Slow Heart Rate       PROBLEM LIST:  1. Hypertension:  a.  History of elevated blood pressure due to stress.  b. Discontinuation of enalapril secondary to hypotension, June 2013.  2. Bradycardia-Holter October 2017: Average heart rate 52 bpm, range of 36-94 bpm. Asymptomatic  3. Hypothyroidism.  4. Depression.  5. Anxiety.  6. Osteoporosis, mild.  7. Mild dyslipidemia.  8. History of Bell’s palsy.  9. Surgical history:  a. Bilateral cataract extraction.    ASSESSMENT:   Diagnosis Plan   1. Essential hypertension     2. Bradycardia         PLAN:  Blood pressure lability: Continue as needed clonidine for blood pressures greater than 150/100 mmHg    Sinus bradycardia: Holter monitor consistent with underlying sick sinus physiology, currently asymptomatic. Continue clinical follow-up. Currently no indication for pacing. She has not required as needed clonidine, if she does begin to require this medication which can worsen bradycardia arrhythmias I would recommend switching over to when necessary hydralazine.      Subjective  Blood pressures are predominantly running less than 140/90 mmHg.  She is not required any as needed clonidine.  Denies chest pain, palpitations or dyspnea.  Maintaining an active lifestyle, currently 80 sitting for her 6-month-old grandchild. For generalized fatigue. No further episodes of lightheadedness. Reviewed results of Holter monitor which are suggestive of underlying sick sinus physiology which currently appears asymptomatic.    PHYSICAL EXAMINATION:  Vitals:    08/21/18 1456   BP: 120/80   BP Location: Left arm   Patient Position: Sitting   Pulse: 54   Weight: 71.8 kg (158 lb 6.4 oz)   Height: 160 cm (63\")     General Appearance:    " Alert, cooperative, no distress, appears stated age   Head:    Normocephalic, without obvious abnormality, atraumatic   Eyes:    conjunctiva/corneas clear   Nose:   Nares normal, septum midline, mucosa normal, no drainage   Throat:   Lips, teeth and gums normal   Neck:   Supple, symmetrical, trachea midline, no carotid    bruit or JVD   Lungs:     Clear to auscultation bilaterally, respirations unlabored   Chest Wall:    No tenderness or deformity    Heart:    Regular rate and rhythm, S1 and S2 normal, no murmur, rub   or gallop, normal carotid impulse bilaterally without bruit.   Abdomen:     Soft, non-tender   Extremities:   Extremities normal, atraumatic, no cyanosis or edema   Pulses:   2+ and symmetric all extremities   Skin:   Skin color, texture, turgor normal, no rashes or lesions       Diagnostic Data:  Procedures  No results found for: CHLPL, TRIG, HDL, LDLDIRECT  No results found for: GLUCOSE, BUN, CREATININE, NA, K, CL, CO2, CREATININE, BUN  No results found for: HGBA1C  No results found for: WBC, HGB, HCT, PLT    Allergies  No Known Allergies    Current Medications    Current Outpatient Prescriptions:   •  aspirin 81 MG EC tablet, Take 81 mg by mouth daily., Disp: , Rfl:   •  B Complex Vitamins (VITAMIN B COMPLEX) capsule capsule, Take 1 capsule by mouth Daily., Disp: , Rfl:   •  Calcium Citrate (CITRACAL PO), Take  by mouth daily. + Magnesium, Disp: , Rfl:   •  CloNIDine (CATAPRES) 0.1 MG tablet, Take 1 tablet by mouth 3 (Three) Times a Day As Needed for high blood pressure (SBP> 150 and/or DBP>90)., Disp: 30 tablet, Rfl: 11  •  Garlic 100 MG tablet, Take 1 tablet by mouth As Needed., Disp: , Rfl:   •  levothyroxine (SYNTHROID, LEVOTHROID) 25 MCG tablet, Take 25 mcg by mouth daily., Disp: , Rfl:   •  meloxicam (MOBIC) 15 MG tablet, Take 1 tablet by mouth As Needed for Moderate Pain ., Disp: 30 tablet, Rfl: 11  •  Misc Natural Products (OSTEO BI-FLEX TRIPLE STRENGTH PO), Take  by mouth daily., Disp: ,  Rfl:   •  Misc. Devices (GELLHORN PESSARY) misc, Use as directed (Size 3), Disp: 1 each, Rfl: 0  •  Omega-3 Fatty Acids (FISH OIL) 1200 MG capsule capsule, Take 1,200 mg by mouth 2 (two) times a day., Disp: , Rfl:   •  PARoxetine (PAXIL) 20 MG tablet, Take 1 tablet by mouth Every Morning., Disp: 90 tablet, Rfl: 3          ROS  Review of Systems   Cardiovascular: Negative for chest pain, dyspnea on exertion, irregular heartbeat and near-syncope.   Respiratory: Negative for shortness of breath.        SOCIAL HX  Social History     Social History   • Marital status:      Spouse name: N/A   • Number of children: N/A   • Years of education: N/A     Occupational History   • Not on file.     Social History Main Topics   • Smoking status: Never Smoker   • Smokeless tobacco: Never Used   • Alcohol use No   • Drug use: No   • Sexual activity: Defer     Other Topics Concern   • Not on file     Social History Narrative   • No narrative on file       FAMILY HX  Family History   Problem Relation Age of Onset   • Cancer Mother         skin   • Hypertension Mother    • Cancer Father         colon   • Colon cancer Paternal Grandmother    • Colon cancer Paternal Uncle    • Breast cancer Maternal Aunt              Reinaldo Cope III, MD, [facility]

## 2018-09-23 ENCOUNTER — OFFICE VISIT (OUTPATIENT)
Dept: PRIMARY CARE CLINIC | Age: 73
End: 2018-09-23
Payer: MEDICARE

## 2018-09-23 VITALS
HEART RATE: 63 BPM | OXYGEN SATURATION: 95 % | SYSTOLIC BLOOD PRESSURE: 180 MMHG | DIASTOLIC BLOOD PRESSURE: 78 MMHG | TEMPERATURE: 99.2 F

## 2018-09-23 DIAGNOSIS — B96.89 ACUTE BACTERIAL SINUSITIS: Primary | ICD-10-CM

## 2018-09-23 DIAGNOSIS — J01.90 ACUTE BACTERIAL SINUSITIS: Primary | ICD-10-CM

## 2018-09-23 PROCEDURE — 1090F PRES/ABSN URINE INCON ASSESS: CPT | Performed by: NURSE PRACTITIONER

## 2018-09-23 PROCEDURE — G8427 DOCREV CUR MEDS BY ELIG CLIN: HCPCS | Performed by: NURSE PRACTITIONER

## 2018-09-23 PROCEDURE — G8421 BMI NOT CALCULATED: HCPCS | Performed by: NURSE PRACTITIONER

## 2018-09-23 PROCEDURE — 3017F COLORECTAL CA SCREEN DOC REV: CPT | Performed by: NURSE PRACTITIONER

## 2018-09-23 PROCEDURE — 1101F PT FALLS ASSESS-DOCD LE1/YR: CPT | Performed by: NURSE PRACTITIONER

## 2018-09-23 PROCEDURE — 1123F ACP DISCUSS/DSCN MKR DOCD: CPT | Performed by: NURSE PRACTITIONER

## 2018-09-23 PROCEDURE — 4040F PNEUMOC VAC/ADMIN/RCVD: CPT | Performed by: NURSE PRACTITIONER

## 2018-09-23 PROCEDURE — 4004F PT TOBACCO SCREEN RCVD TLK: CPT | Performed by: NURSE PRACTITIONER

## 2018-09-23 PROCEDURE — 96372 THER/PROPH/DIAG INJ SC/IM: CPT | Performed by: NURSE PRACTITIONER

## 2018-09-23 PROCEDURE — G8400 PT W/DXA NO RESULTS DOC: HCPCS | Performed by: NURSE PRACTITIONER

## 2018-09-23 PROCEDURE — 99213 OFFICE O/P EST LOW 20 MIN: CPT | Performed by: NURSE PRACTITIONER

## 2018-09-23 RX ORDER — MELOXICAM 15 MG/1
15 TABLET ORAL DAILY
Status: ON HOLD | COMMUNITY
Start: 2017-10-09 | End: 2022-03-18

## 2018-09-23 RX ORDER — VITAMIN B COMPLEX
1 CAPSULE ORAL
Status: ON HOLD | COMMUNITY
End: 2022-03-18

## 2018-09-23 RX ORDER — CEFTRIAXONE 500 MG/1
500 INJECTION, POWDER, FOR SOLUTION INTRAMUSCULAR; INTRAVENOUS ONCE
Status: COMPLETED | OUTPATIENT
Start: 2018-09-23 | End: 2018-09-23

## 2018-09-23 RX ORDER — LEVOTHYROXINE SODIUM 0.03 MG/1
25 TABLET ORAL DAILY
Status: ON HOLD | COMMUNITY
End: 2022-03-18

## 2018-09-23 RX ORDER — GUAIFENESIN 600 MG/1
1200 TABLET, EXTENDED RELEASE ORAL 2 TIMES DAILY PRN
COMMUNITY
Start: 2018-09-23 | End: 2019-08-24

## 2018-09-23 RX ORDER — BENZONATATE 100 MG/1
100 CAPSULE ORAL 3 TIMES DAILY PRN
Qty: 30 CAPSULE | Refills: 0 | Status: SHIPPED | OUTPATIENT
Start: 2018-09-23 | End: 2018-09-30

## 2018-09-23 RX ORDER — ASPIRIN 81 MG/1
81 TABLET ORAL DAILY
Status: ON HOLD | COMMUNITY
End: 2022-03-19 | Stop reason: HOSPADM

## 2018-09-23 RX ORDER — IBUPROFEN 200 MG
1 CAPSULE ORAL 2 TIMES DAILY
Status: ON HOLD | COMMUNITY
End: 2022-03-18

## 2018-09-23 RX ORDER — CLONIDINE HYDROCHLORIDE 0.1 MG/1
0.1 TABLET ORAL DAILY
Status: ON HOLD | COMMUNITY
Start: 2017-02-23 | End: 2022-03-18

## 2018-09-23 RX ADMIN — CEFTRIAXONE 500 MG: 500 INJECTION, POWDER, FOR SOLUTION INTRAMUSCULAR; INTRAVENOUS at 15:27

## 2018-09-23 ASSESSMENT — ENCOUNTER SYMPTOMS
SORE THROAT: 1
COUGH: 1

## 2018-10-19 ENCOUNTER — HOSPITAL ENCOUNTER (OUTPATIENT)
Facility: HOSPITAL | Age: 73
Discharge: HOME | End: 2018-10-19
Payer: MEDICARE

## 2018-10-19 ENCOUNTER — HOSPITAL ENCOUNTER (OUTPATIENT)
Dept: GENERAL RADIOLOGY | Facility: HOSPITAL | Age: 73
Discharge: HOME | End: 2018-10-19
Payer: MEDICARE

## 2018-10-19 DIAGNOSIS — R06.02 BREATH SHORTNESS: ICD-10-CM

## 2018-10-19 LAB
A/G RATIO: 1.6 (ref 0.8–2)
ALBUMIN SERPL-MCNC: 3.8 G/DL (ref 3.4–4.8)
ALP BLD-CCNC: 91 U/L (ref 25–100)
ALT SERPL-CCNC: 13 U/L (ref 4–36)
ANION GAP SERPL CALCULATED.3IONS-SCNC: 14 MMOL/L (ref 3–16)
AST SERPL-CCNC: 20 U/L (ref 8–33)
BASOPHILS ABSOLUTE: 0 K/UL (ref 0–0.1)
BASOPHILS RELATIVE PERCENT: 0.3 %
BILIRUB SERPL-MCNC: <0.2 MG/DL (ref 0.3–1.2)
BUN BLDV-MCNC: 12 MG/DL (ref 6–20)
CALCIUM SERPL-MCNC: 9.3 MG/DL (ref 8.5–10.5)
CHLORIDE BLD-SCNC: 100 MMOL/L (ref 98–107)
CO2: 24 MMOL/L (ref 20–30)
CREAT SERPL-MCNC: 0.8 MG/DL (ref 0.4–1.2)
EOSINOPHILS ABSOLUTE: 0.8 K/UL (ref 0–0.4)
EOSINOPHILS RELATIVE PERCENT: 11.4 %
GFR AFRICAN AMERICAN: >59
GFR NON-AFRICAN AMERICAN: >60
GLOBULIN: 2.4 G/DL
GLUCOSE BLD-MCNC: 110 MG/DL (ref 74–106)
HCT VFR BLD CALC: 38.7 % (ref 37–47)
HEMOGLOBIN: 12.2 G/DL (ref 11.5–16.5)
IMMATURE GRANULOCYTES #: 0 K/UL
IMMATURE GRANULOCYTES %: 0.4 % (ref 0–5)
LYMPHOCYTES ABSOLUTE: 1.6 K/UL (ref 1.5–4)
LYMPHOCYTES RELATIVE PERCENT: 22.9 %
MCH RBC QN AUTO: 28.9 PG (ref 27–32)
MCHC RBC AUTO-ENTMCNC: 31.5 G/DL (ref 31–35)
MCV RBC AUTO: 91.7 FL (ref 80–100)
MONOCYTES ABSOLUTE: 0.7 K/UL (ref 0.2–0.8)
MONOCYTES RELATIVE PERCENT: 9.8 %
NEUTROPHILS ABSOLUTE: 3.9 K/UL (ref 2–7.5)
NEUTROPHILS RELATIVE PERCENT: 55.2 %
PDW BLD-RTO: 13.1 % (ref 11–16)
PLATELET # BLD: 379 K/UL (ref 150–400)
PMV BLD AUTO: 9.3 FL (ref 6–10)
POTASSIUM SERPL-SCNC: 4.1 MMOL/L (ref 3.4–5.1)
RBC # BLD: 4.22 M/UL (ref 3.8–5.8)
SODIUM BLD-SCNC: 138 MMOL/L (ref 136–145)
TOTAL PROTEIN: 6.2 G/DL (ref 6.4–8.3)
WBC # BLD: 7.1 K/UL (ref 4–11)

## 2018-10-19 PROCEDURE — 80053 COMPREHEN METABOLIC PANEL: CPT

## 2018-10-19 PROCEDURE — 93005 ELECTROCARDIOGRAM TRACING: CPT

## 2018-10-19 PROCEDURE — 36415 COLL VENOUS BLD VENIPUNCTURE: CPT

## 2018-10-19 PROCEDURE — 71046 X-RAY EXAM CHEST 2 VIEWS: CPT

## 2018-10-19 PROCEDURE — 85025 COMPLETE CBC W/AUTO DIFF WBC: CPT

## 2018-11-07 ENCOUNTER — OFFICE VISIT (OUTPATIENT)
Dept: NEUROLOGY | Facility: CLINIC | Age: 73
End: 2018-11-07

## 2018-11-07 VITALS
DIASTOLIC BLOOD PRESSURE: 74 MMHG | HEART RATE: 56 BPM | HEIGHT: 63 IN | SYSTOLIC BLOOD PRESSURE: 131 MMHG | BODY MASS INDEX: 27.11 KG/M2 | WEIGHT: 153 LBS

## 2018-11-07 DIAGNOSIS — G44.209 TENSION HEADACHE: Primary | ICD-10-CM

## 2018-11-07 DIAGNOSIS — F41.9 ANXIETY: ICD-10-CM

## 2018-11-07 PROCEDURE — 99213 OFFICE O/P EST LOW 20 MIN: CPT | Performed by: PSYCHIATRY & NEUROLOGY

## 2018-11-07 RX ORDER — PAROXETINE HYDROCHLORIDE 20 MG/1
20 TABLET, FILM COATED ORAL EVERY MORNING
Qty: 90 TABLET | Refills: 3 | Status: SHIPPED | OUTPATIENT
Start: 2018-11-07 | End: 2020-06-22

## 2018-11-07 NOTE — PROGRESS NOTES
Subjective:     Patient ID: Divine Banuelos is a 73 y.o. female.    CC:   Chief Complaint   Patient presents with   • Neck Pain       HPI:   History of Present Illness  The following portions of the patient's history were reviewed and updated as appropriate: allergies, current medications, past family history, past medical history, past social history, past surgical history and problem list.     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 Headaches, aches and pains stable, takes prn Tylenol.    Past Medical History:   Diagnosis Date   • Anxiety    • Arthritis    • Bell's palsy    • Depression    • Hyperlipidemia    • Hypertension    • Hypothyroidism    • Osteoporosis        Past Surgical History:   Procedure Laterality Date   • CATARACT EXTRACTION Bilateral    • CATARACT EXTRACTION, BILATERAL     • RETINAL DETACHMENT REPAIR         Social History     Social History   • Marital status:      Spouse name: N/A   • Number of children: N/A   • Years of education: N/A     Occupational History   • Not on file.     Social History Main Topics   • Smoking status: Never Smoker   • Smokeless tobacco: Never Used   • Alcohol use No   • Drug use: No   • Sexual activity: Defer     Other Topics Concern   • Not on file     Social History Narrative   • No narrative on file       Family History   Problem Relation Age of Onset   • Cancer Mother         skin   • Hypertension Mother    • Cancer Father         colon   • Colon cancer Paternal Grandmother    • Colon cancer Paternal Uncle    • Breast cancer Maternal Aunt         Review of Systems   Constitutional: Negative for chills, fatigue, fever and unexpected weight change.   HENT: Negative for ear pain, hearing loss, nosebleeds, rhinorrhea and sore throat.    Eyes: Negative for photophobia, pain, discharge, itching and visual disturbance.   Respiratory: Negative for cough, chest tightness, shortness of breath and wheezing.    Cardiovascular:  Negative for chest pain, palpitations and leg swelling.   Gastrointestinal: Negative for abdominal pain, blood in stool, constipation, diarrhea, nausea and vomiting.   Genitourinary: Negative for dysuria, frequency, hematuria and urgency.   Musculoskeletal: Positive for arthralgias, back pain, myalgias and neck stiffness. Negative for gait problem, joint swelling and neck pain.   Skin: Negative for rash and wound.   Allergic/Immunologic: Negative for environmental allergies and food allergies.   Neurological: Negative for dizziness, tremors, seizures, syncope, speech difficulty, weakness, light-headedness, numbness and headaches.   Hematological: Negative for adenopathy. Does not bruise/bleed easily.   Psychiatric/Behavioral: Negative for agitation, confusion, decreased concentration, hallucinations, sleep disturbance and suicidal ideas. The patient is not nervous/anxious.         Objective:    Neurologic Exam     Mental Status   Oriented to person, place, and time.       Physical Exam   Constitutional: She is oriented to person, place, and time. She appears well-developed and well-nourished.   Cardiovascular: Normal rate and regular rhythm.    Pulmonary/Chest: Effort normal.   Neurological: She is alert and oriented to person, place, and time. She has normal reflexes.   Psychiatric: She has a normal mood and affect. Her behavior is normal. Thought content normal.       Assessment/Plan:       Divine was seen today for neck pain.    Diagnoses and all orders for this visit:    Tension headache        -     Prn Tylenol.  Anxiety  -     PARoxetine (PAXIL) 20 MG tablet; Take 1 tablet by mouth Every Morning.             Shawn Juárez MD  11/7/2018

## 2019-01-25 ENCOUNTER — OFFICE VISIT (OUTPATIENT)
Dept: SURGERY | Facility: CLINIC | Age: 74
End: 2019-01-25

## 2019-01-25 ENCOUNTER — HOSPITAL ENCOUNTER (OUTPATIENT)
Dept: GENERAL RADIOLOGY | Facility: HOSPITAL | Age: 74
Discharge: HOME | End: 2019-01-25
Admitting: NURSE PRACTITIONER

## 2019-01-25 ENCOUNTER — TRANSCRIBE ORDERS (OUTPATIENT)
Dept: ADMINISTRATIVE | Facility: HOSPITAL | Age: 74
End: 2019-01-25

## 2019-01-25 VITALS
SYSTOLIC BLOOD PRESSURE: 144 MMHG | TEMPERATURE: 98.2 F | HEIGHT: 63 IN | BODY MASS INDEX: 28 KG/M2 | WEIGHT: 158 LBS | OXYGEN SATURATION: 99 % | HEART RATE: 62 BPM | DIASTOLIC BLOOD PRESSURE: 70 MMHG

## 2019-01-25 DIAGNOSIS — L72.3 SEBACEOUS CYST: Primary | ICD-10-CM

## 2019-01-25 DIAGNOSIS — M25.512 LEFT SHOULDER PAIN, UNSPECIFIED CHRONICITY: Primary | ICD-10-CM

## 2019-01-25 PROCEDURE — 99202 OFFICE O/P NEW SF 15 MIN: CPT | Performed by: SURGERY

## 2019-01-25 PROCEDURE — 73030 X-RAY EXAM OF SHOULDER: CPT

## 2019-01-25 NOTE — PROGRESS NOTES
Patient: Divine Banuelos    YOB: 1945    Date: 01/25/2019    Primary Care Provider: Queenie Amezcua APRN    Chief Complaint   Patient presents with   • Mass     mass on neck       Subjective .     History of present illness:  I saw the patient in the office today for an evaluation and treatment of a mass on her neck. She complains of a mass on her right neck for the past year, has gotten bigger. She denies pain.     The following portions of the patient's history were reviewed and updated as appropriate: allergies, current medications, past family history, past medical history, past social history, past surgical history and problem list.       Review of Systems   Constitutional: Negative for chills, fever and unexpected weight change.   HENT: Negative for hearing loss, trouble swallowing and voice change.    Eyes: Negative for visual disturbance.   Respiratory: Negative for apnea, cough, chest tightness, shortness of breath and wheezing.    Cardiovascular: Negative for chest pain, palpitations and leg swelling.   Gastrointestinal: Negative for abdominal distention, abdominal pain, anal bleeding, blood in stool, constipation, diarrhea, nausea, rectal pain and vomiting.   Endocrine: Negative for cold intolerance and heat intolerance.   Genitourinary: Negative for difficulty urinating, dysuria and flank pain.   Musculoskeletal: Negative for back pain and gait problem.   Skin: Negative for color change, rash and wound.   Neurological: Negative for dizziness, syncope, speech difficulty, weakness, light-headedness, numbness and headaches.   Hematological: Negative for adenopathy. Does not bruise/bleed easily.   Psychiatric/Behavioral: Negative for confusion. The patient is not nervous/anxious.        Allergies:  No Known Allergies    Medications:    Current Outpatient Medications:   •  levothyroxine (SYNTHROID, LEVOTHROID) 25 MCG tablet, Take 25 mcg by mouth daily., Disp: , Rfl:   •  aspirin 81 MG EC  "tablet, Take 81 mg by mouth daily., Disp: , Rfl:   •  B Complex Vitamins (VITAMIN B COMPLEX) capsule capsule, Take 1 capsule by mouth Daily., Disp: , Rfl:   •  Calcium Citrate (CITRACAL PO), Take  by mouth daily. + Magnesium, Disp: , Rfl:   •  CloNIDine (CATAPRES) 0.1 MG tablet, Take 1 tablet by mouth 3 (Three) Times a Day As Needed for high blood pressure (SBP> 150 and/or DBP>90)., Disp: 30 tablet, Rfl: 11  •  Garlic 100 MG tablet, Take 1 tablet by mouth As Needed., Disp: , Rfl:   •  meloxicam (MOBIC) 15 MG tablet, Take 1 tablet by mouth As Needed for Moderate Pain ., Disp: 30 tablet, Rfl: 11  •  Misc Natural Products (OSTEO BI-FLEX TRIPLE STRENGTH PO), Take  by mouth daily., Disp: , Rfl:   •  Misc. Devices (GELLHORN PESSARY) misc, Use as directed (Size 3), Disp: 1 each, Rfl: 0  •  Omega-3 Fatty Acids (FISH OIL) 1200 MG capsule capsule, Take 1,200 mg by mouth 2 (two) times a day., Disp: , Rfl:   •  PARoxetine (PAXIL) 20 MG tablet, Take 1 tablet by mouth Every Morning., Disp: 90 tablet, Rfl: 3    History:  Past Medical History:   Diagnosis Date   • Anxiety    • Arthritis    • Bell's palsy    • Depression    • Hyperlipidemia    • Hypertension    • Hypothyroidism    • Osteoporosis        Past Surgical History:   Procedure Laterality Date   • CATARACT EXTRACTION Bilateral    • CATARACT EXTRACTION, BILATERAL     • RETINAL DETACHMENT REPAIR         Family History   Problem Relation Age of Onset   • Cancer Mother         skin   • Hypertension Mother    • Cancer Father         colon   • Colon cancer Paternal Grandmother    • Colon cancer Paternal Uncle    • Breast cancer Maternal Aunt        Social History     Tobacco Use   • Smoking status: Never Smoker   • Smokeless tobacco: Never Used   Substance Use Topics   • Alcohol use: No   • Drug use: No        Objective     Vital Signs:   Vitals:    01/25/19 1338   BP: 144/70   Pulse: 62   Temp: 98.2 °F (36.8 °C)   SpO2: 99%   Weight: 71.7 kg (158 lb)   Height: 160 cm (62.99\") "       Physical Exam:   General Appearance:    Alert, cooperative, in no acute distress   Head:    Normocephalic, without obvious abnormality, atraumatic   Eyes:            Lids and lashes normal, conjunctivae and sclerae normal, no   icterus, no pallor, corneas clear, PERRLA   Ears:    Ears appear intact with no abnormalities noted   Throat:   No oral lesions, no thrush, oral mucosa moist   Neck:   No adenopathy, supple, trachea midline, no thyromegaly, no   carotid bruit, no JVD.  2 cm sebaceous cyst right neck, tender and fluctuant    Lungs:     Clear to auscultation,respirations regular, even and                  unlabored    Heart:    Regular rhythm and normal rate, normal S1 and S2, no            murmur, no gallop, no rub, no click   Chest Wall:    No abnormalities observed   Abdomen:     Normal bowel sounds, no masses, no organomegaly, soft        non-tender, non-distended, no guarding, no rebound                tenderness   Extremities:   Moves all extremities well, no edema, no cyanosis, no             redness   Pulses:   Pulses palpable and equal bilaterally   Skin:   No bleeding, bruising or rash   Lymph nodes:   No palpable adenopathy   Neurologic:   Cranial nerves 2 - 12 grossly intact, sensation intact, DTR       present and equal bilaterally     Results Review:   I reviewed the patient's new clinical results.    Assessment/Plan     1. Sebaceous cyst        Patient scheduled for excision of sebaceous cyst on the right neck.  Risk of bleeding infection and recurrence discussed and patient agreeable    I discussed the patients findings and my recommendations with patient    Review of Systems was reviewed and confirmed as accurate today.    Electronically signed by Stuart Caceres MD  01/25/19    Portions of this note has been scribed for Stuart Caceres MD by Terri Headley. 1/25/2019  2:12 PM    .

## 2019-01-30 DIAGNOSIS — M25.512 ARTHRALGIA OF LEFT SHOULDER REGION: Primary | ICD-10-CM

## 2019-01-31 ENCOUNTER — OFFICE VISIT (OUTPATIENT)
Dept: ORTHOPEDIC SURGERY | Facility: CLINIC | Age: 74
End: 2019-01-31

## 2019-01-31 VITALS — HEIGHT: 63 IN | WEIGHT: 158 LBS | BODY MASS INDEX: 28 KG/M2 | RESPIRATION RATE: 18 BRPM

## 2019-01-31 DIAGNOSIS — R52 PAIN: Primary | ICD-10-CM

## 2019-01-31 DIAGNOSIS — M75.42 SHOULDER IMPINGEMENT SYNDROME, LEFT: ICD-10-CM

## 2019-01-31 DIAGNOSIS — M19.012 PRIMARY OSTEOARTHRITIS OF LEFT SHOULDER: ICD-10-CM

## 2019-01-31 PROCEDURE — 99213 OFFICE O/P EST LOW 20 MIN: CPT | Performed by: PHYSICIAN ASSISTANT

## 2019-01-31 PROCEDURE — 20610 DRAIN/INJ JOINT/BURSA W/O US: CPT | Performed by: PHYSICIAN ASSISTANT

## 2019-01-31 RX ORDER — LIDOCAINE HYDROCHLORIDE 10 MG/ML
2 INJECTION, SOLUTION EPIDURAL; INFILTRATION; INTRACAUDAL; PERINEURAL
Status: COMPLETED | OUTPATIENT
Start: 2019-01-31 | End: 2019-01-31

## 2019-01-31 RX ORDER — METHYLPREDNISOLONE ACETATE 40 MG/ML
40 INJECTION, SUSPENSION INTRA-ARTICULAR; INTRALESIONAL; INTRAMUSCULAR; SOFT TISSUE
Status: COMPLETED | OUTPATIENT
Start: 2019-01-31 | End: 2019-01-31

## 2019-01-31 RX ADMIN — LIDOCAINE HYDROCHLORIDE 2 ML: 10 INJECTION, SOLUTION EPIDURAL; INFILTRATION; INTRACAUDAL; PERINEURAL at 14:20

## 2019-01-31 RX ADMIN — METHYLPREDNISOLONE ACETATE 40 MG: 40 INJECTION, SUSPENSION INTRA-ARTICULAR; INTRALESIONAL; INTRAMUSCULAR; SOFT TISSUE at 14:20

## 2019-01-31 NOTE — PROGRESS NOTES
Subjective   Patient ID: Divine Banuelos is a 73 y.o. right hand dominant female  Pain of the Left Shoulder (Patient is here today with left shoulder pain, she denies any injuries but states she does pull and tug to her mom. About 3 months ago the pain started when she had to start pulling her mom up in bed and no matter how bad it hurts she still has to do it.)         History of Present Illness  Patient presents as a new patient with complaints of left shoulder pain that has been ongoing for 3 months.  Patient denies injury or trauma.  She states she does have pain radiating from the neck into the left elbow but this is not constant.  She has tried anti-inflammatories with no improvement.                                                 Past Medical History:   Diagnosis Date   • Anxiety    • Arthritis    • Bell's palsy    • Depression    • Hyperlipidemia    • Hypertension    • Hypothyroidism    • Osteoporosis         Past Surgical History:   Procedure Laterality Date   • CATARACT EXTRACTION Bilateral    • CATARACT EXTRACTION, BILATERAL     • RETINAL DETACHMENT REPAIR         Family History   Problem Relation Age of Onset   • Cancer Mother         skin   • Hypertension Mother    • Cancer Father         colon   • Colon cancer Paternal Grandmother    • Colon cancer Paternal Uncle    • Breast cancer Maternal Aunt        Social History     Socioeconomic History   • Marital status:      Spouse name: Not on file   • Number of children: Not on file   • Years of education: Not on file   • Highest education level: Not on file   Social Needs   • Financial resource strain: Not on file   • Food insecurity - worry: Not on file   • Food insecurity - inability: Not on file   • Transportation needs - medical: Not on file   • Transportation needs - non-medical: Not on file   Occupational History   • Not on file   Tobacco Use   • Smoking status: Never Smoker   • Smokeless tobacco: Never Used   Substance and Sexual Activity    • Alcohol use: No   • Drug use: No   • Sexual activity: Defer     Birth control/protection: Post-menopausal   Other Topics Concern   • Not on file   Social History Narrative   • Not on file         Current Outpatient Medications:   •  aspirin 81 MG EC tablet, Take 81 mg by mouth daily., Disp: , Rfl:   •  B Complex Vitamins (VITAMIN B COMPLEX) capsule capsule, Take 1 capsule by mouth Daily., Disp: , Rfl:   •  Calcium Citrate (CITRACAL PO), Take  by mouth daily. + Magnesium, Disp: , Rfl:   •  CloNIDine (CATAPRES) 0.1 MG tablet, Take 1 tablet by mouth 3 (Three) Times a Day As Needed for high blood pressure (SBP> 150 and/or DBP>90)., Disp: 30 tablet, Rfl: 11  •  Garlic 100 MG tablet, Take 1 tablet by mouth As Needed., Disp: , Rfl:   •  levothyroxine (SYNTHROID, LEVOTHROID) 25 MCG tablet, Take 25 mcg by mouth daily., Disp: , Rfl:   •  meloxicam (MOBIC) 15 MG tablet, Take 1 tablet by mouth As Needed for Moderate Pain ., Disp: 30 tablet, Rfl: 11  •  Misc Natural Products (OSTEO BI-FLEX TRIPLE STRENGTH PO), Take  by mouth daily., Disp: , Rfl:   •  Misc. Devices (GELLHORN PESSARY) misc, Use as directed (Size 3), Disp: 1 each, Rfl: 0  •  Omega-3 Fatty Acids (FISH OIL) 1200 MG capsule capsule, Take 1,200 mg by mouth 2 (two) times a day., Disp: , Rfl:   •  PARoxetine (PAXIL) 20 MG tablet, Take 1 tablet by mouth Every Morning., Disp: 90 tablet, Rfl: 3    No Known Allergies    Review of Systems   Constitutional: Negative for fever.   HENT: Negative for voice change.    Eyes: Negative for visual disturbance.   Respiratory: Negative for shortness of breath.    Cardiovascular: Negative for chest pain.   Gastrointestinal: Negative for abdominal distention and abdominal pain.   Genitourinary: Negative for dysuria.   Musculoskeletal: Positive for arthralgias. Negative for gait problem and joint swelling.   Skin: Negative for rash.   Neurological: Negative for speech difficulty.   Hematological: Does not bruise/bleed easily.  "  Psychiatric/Behavioral: Negative for confusion.       Objective   Resp 18   Ht 160 cm (62.99\")   Wt 71.7 kg (158 lb)   LMP  (LMP Unknown)   BMI 28.00 kg/m²    Physical Exam   Constitutional: She is oriented to person, place, and time. She appears well-nourished.   Eyes: Conjunctivae are normal.   Pulmonary/Chest: Effort normal.   Musculoskeletal:        Left shoulder: She exhibits decreased range of motion, tenderness, pain and spasm. She exhibits no deformity.        Left upper arm: She exhibits tenderness.        Left hand: She exhibits normal capillary refill and no swelling. Normal sensation noted. Normal strength noted.   Neurological: She is alert and oriented to person, place, and time.   Skin: Capillary refill takes less than 2 seconds.   Psychiatric: She has a normal mood and affect.   Vitals reviewed.    Left Shoulder Exam     Range of Motion   Active abduction: 90   Passive abduction: 100   Forward flexion: 110   Internal rotation 0 degrees: Sacrum     Muscle Strength   The patient has normal left shoulder strength.    Tests   Cross arm: positive  Impingement: positive             Neurologic Exam     Mental Status   Oriented to person, place, and time.        Negative Spurling's test      Assessment/Plan   Independent Review of Radiographic Studies:    I did review x-ray imaging of the left shoulder.  There is no acute fracture or dislocation.  There is however significant glenohumeral arthritic changes      Large Joint Arthrocentesis: L glenohumeral  Date/Time: 1/31/2019 2:20 PM  Consent given by: patient  Site marked: site marked  Timeout: Immediately prior to procedure a time out was called to verify the correct patient, procedure, equipment, support staff and site/side marked as required   Supporting Documentation  Indications: pain   Procedure Details  Location: shoulder - L glenohumeral  Preparation: Patient was prepped and draped in the usual sterile fashion  Needle size: 22 G  Approach: " posterior  Medications administered: 2 mL lidocaine PF 1% 1 %; 40 mg methylPREDNISolone acetate 40 MG/ML  Patient tolerance: patient tolerated the procedure well with no immediate complications             Divine was seen today for pain.    Diagnoses and all orders for this visit:    Pain  -     Large Joint Arthrocentesis: L glenohumeral    Primary osteoarthritis of left shoulder    Shoulder impingement syndrome, left       Orthopedic activities reviewed and patient expressed appreciation  Discussion of orthopedic goals  Risk, benefits, and merits of treatment alternatives reviewed with the patient and questions answered  Call or notify for any adverse effect from injection therapy    Recommendations/Plan:  Exercise, medications, injections, other patient advice, and return appointment as noted.  Patient is encouraged to call or return for any issues or concerns.    If no improvement in 3 months we'll order an MRI of the left shoulder to assess rotator cuff tendons patient may be a candidate for reverse total shoulder replacement versus conventional shoulder replacement    Patient agreeable to call or return sooner for any concerns.

## 2019-02-08 ENCOUNTER — PROCEDURE VISIT (OUTPATIENT)
Dept: SURGERY | Facility: CLINIC | Age: 74
End: 2019-02-08

## 2019-02-08 VITALS
SYSTOLIC BLOOD PRESSURE: 152 MMHG | WEIGHT: 158.07 LBS | HEART RATE: 55 BPM | DIASTOLIC BLOOD PRESSURE: 72 MMHG | HEIGHT: 63 IN | BODY MASS INDEX: 28.01 KG/M2 | OXYGEN SATURATION: 96 % | TEMPERATURE: 98.4 F

## 2019-02-08 DIAGNOSIS — L72.3 SEBACEOUS CYST: Primary | ICD-10-CM

## 2019-02-08 PROCEDURE — 12032 INTMD RPR S/A/T/EXT 2.6-7.5: CPT | Performed by: SURGERY

## 2019-02-08 PROCEDURE — 11403 EXC TR-EXT B9+MARG 2.1-3CM: CPT | Performed by: SURGERY

## 2019-02-08 NOTE — PROGRESS NOTES
Location:neck    Procedure:Patient is in the office today for an excision of a cyst @ neck.  Patient has given proper consent.     Excision 2.8 cm cyst on the neck with layered closure    I recommend excision. Procedure and the risks and benefits were explained including bleeding and infection. The patient understands these and wishes to proceed.     The patient was brought to the procedure room. Consent and time out were performed. The area was prepped and draped in the usual fashion. 1% lidocaine with epinephrine was infused locally. An ellyptical incision was made around the lesion. Full thickness excision was performed. The lesion size was 2.8 cm. The wound was closed in layers with interrupted simple vicryl and Nylon for the skin. Wound closure size was 3.5 cm. There were no complications and the patient tolerated the procedure well. Hemostasis was well controlled with pressure and there was minimal blood loss. Wound instructions were given.     Portions of this note have been scribed for Stuart Caceres MD by Parvin Garcia. 2/8/2019  1:19 PM

## 2019-02-15 ENCOUNTER — OFFICE VISIT (OUTPATIENT)
Dept: SURGERY | Facility: CLINIC | Age: 74
End: 2019-02-15

## 2019-02-15 VITALS
HEIGHT: 63 IN | WEIGHT: 157 LBS | DIASTOLIC BLOOD PRESSURE: 68 MMHG | BODY MASS INDEX: 27.82 KG/M2 | OXYGEN SATURATION: 98 % | TEMPERATURE: 98.6 F | SYSTOLIC BLOOD PRESSURE: 130 MMHG | HEART RATE: 64 BPM

## 2019-02-15 DIAGNOSIS — Z48.89 POSTOPERATIVE VISIT: Primary | ICD-10-CM

## 2019-02-15 PROCEDURE — 99024 POSTOP FOLLOW-UP VISIT: CPT | Performed by: SURGERY

## 2019-02-15 NOTE — PROGRESS NOTES
"Patient: Divine Banuelos    YOB: 1945    Date: 02/15/2019    Primary Care Provider: Queenie Amezcua APRN    Chief Complaint   Patient presents with   • Follow-up     cyst removal       History: Patient is in the office today to follow up on recent sebaceous cyst removal completed on 2/8/19. Patient states she is doing well and has no complaints at this time. Patient denies any drainage, swelling at site.     The following portions of the patient's history were reviewed and updated as appropriate: allergies, current medications, past family history, past medical history, past social history, past surgical history and problem list.      Review of Systems   Constitutional: Negative for chills, fever and unexpected weight change.   HENT: Negative for hearing loss, trouble swallowing and voice change.    Eyes: Negative for visual disturbance.   Respiratory: Negative for apnea, cough, chest tightness, shortness of breath and wheezing.    Cardiovascular: Negative for chest pain, palpitations and leg swelling.   Gastrointestinal: Negative for abdominal distention, abdominal pain, anal bleeding, blood in stool, constipation, diarrhea, nausea, rectal pain and vomiting.   Endocrine: Negative for cold intolerance and heat intolerance.   Genitourinary: Negative for difficulty urinating, dysuria and flank pain.   Musculoskeletal: Negative for back pain and gait problem.   Skin: Negative for color change, rash and wound.   Neurological: Negative for dizziness, syncope, speech difficulty, weakness, light-headedness, numbness and headaches.   Hematological: Negative for adenopathy. Does not bruise/bleed easily.   Psychiatric/Behavioral: Negative for confusion. The patient is not nervous/anxious.        Vital Signs  Vitals:    02/15/19 1327   BP: 130/68   Pulse: 64   Temp: 98.6 °F (37 °C)   SpO2: 98%   Weight: 71.2 kg (157 lb)   Height: 160 cm (62.99\")       Allergies:  No Known Allergies    Medications:    Current " Outpatient Medications:   •  aspirin 81 MG EC tablet, Take 81 mg by mouth daily., Disp: , Rfl:   •  B Complex Vitamins (VITAMIN B COMPLEX) capsule capsule, Take 1 capsule by mouth Daily., Disp: , Rfl:   •  Calcium Citrate (CITRACAL PO), Take  by mouth daily. + Magnesium, Disp: , Rfl:   •  CloNIDine (CATAPRES) 0.1 MG tablet, Take 1 tablet by mouth 3 (Three) Times a Day As Needed for high blood pressure (SBP> 150 and/or DBP>90)., Disp: 30 tablet, Rfl: 11  •  Garlic 100 MG tablet, Take 1 tablet by mouth As Needed., Disp: , Rfl:   •  levothyroxine (SYNTHROID, LEVOTHROID) 25 MCG tablet, Take 25 mcg by mouth daily., Disp: , Rfl:   •  meloxicam (MOBIC) 15 MG tablet, Take 1 tablet by mouth As Needed for Moderate Pain ., Disp: 30 tablet, Rfl: 11  •  Misc Natural Products (OSTEO BI-FLEX TRIPLE STRENGTH PO), Take  by mouth daily., Disp: , Rfl:   •  Misc. Devices (GELLHORN PESSARY) misc, Use as directed (Size 3), Disp: 1 each, Rfl: 0  •  Omega-3 Fatty Acids (FISH OIL) 1200 MG capsule capsule, Take 1,200 mg by mouth 2 (two) times a day., Disp: , Rfl:   •  PARoxetine (PAXIL) 20 MG tablet, Take 1 tablet by mouth Every Morning., Disp: 90 tablet, Rfl: 3    Physical Exam:   General Appearance:    Alert, cooperative, in no acute distress   Head:    Normocephalic, without obvious abnormality, atraumatic   Lungs:     Clear to auscultation,respirations regular, even and                  unlabored    Heart:    Regular rhythm and normal rate, normal S1 and S2, no            murmur, no gallop, no rub, no click   Abdomen:     Normal bowel sounds, no masses, no organomegaly, soft        non-tender, non-distended, no guarding, no rebound                tenderness   Extremities:   Moves all extremities well, no edema, no cyanosis, no             redness   Pulses:   Pulses palpable and equal bilaterally   Skin:   No bleeding, bruising or rash       Results Review:   I reviewed the patient's new clinical results.  I reviewed the patient's new  imaging results and agree with the interpretation.     ASSESSMENT/PLAN:    1. Postoperative visit      Patient doing well, was good.  Follow-up as needed    Electronically signed by Stuart Caceres MD  02/15/19

## 2019-02-28 ENCOUNTER — OFFICE VISIT (OUTPATIENT)
Dept: CARDIOLOGY | Facility: CLINIC | Age: 74
End: 2019-02-28

## 2019-02-28 VITALS
WEIGHT: 159.6 LBS | HEART RATE: 71 BPM | DIASTOLIC BLOOD PRESSURE: 70 MMHG | HEIGHT: 62 IN | SYSTOLIC BLOOD PRESSURE: 130 MMHG | BODY MASS INDEX: 29.37 KG/M2 | OXYGEN SATURATION: 99 %

## 2019-02-28 DIAGNOSIS — R00.1 BRADYCARDIA: ICD-10-CM

## 2019-02-28 DIAGNOSIS — I10 ESSENTIAL HYPERTENSION: Primary | ICD-10-CM

## 2019-02-28 PROCEDURE — 99213 OFFICE O/P EST LOW 20 MIN: CPT | Performed by: INTERNAL MEDICINE

## 2019-03-05 ENCOUNTER — OFFICE VISIT (OUTPATIENT)
Dept: ORTHOPEDIC SURGERY | Facility: CLINIC | Age: 74
End: 2019-03-05

## 2019-03-05 VITALS — BODY MASS INDEX: 29.22 KG/M2 | RESPIRATION RATE: 18 BRPM | HEIGHT: 62 IN | WEIGHT: 158.8 LBS

## 2019-03-05 DIAGNOSIS — M75.42 SHOULDER IMPINGEMENT SYNDROME, LEFT: ICD-10-CM

## 2019-03-05 DIAGNOSIS — M25.512 ARTHRALGIA OF LEFT SHOULDER REGION: Primary | ICD-10-CM

## 2019-03-05 DIAGNOSIS — M54.2 NECK PAIN: ICD-10-CM

## 2019-03-05 DIAGNOSIS — M19.012 PRIMARY OSTEOARTHRITIS OF LEFT SHOULDER: ICD-10-CM

## 2019-03-05 PROCEDURE — 99213 OFFICE O/P EST LOW 20 MIN: CPT | Performed by: PHYSICIAN ASSISTANT

## 2019-03-05 RX ORDER — MELOXICAM 15 MG/1
15 TABLET ORAL AS NEEDED
Qty: 30 TABLET | Refills: 1 | Status: SHIPPED | OUTPATIENT
Start: 2019-03-05 | End: 2019-04-04 | Stop reason: SDUPTHER

## 2019-03-05 NOTE — PROGRESS NOTES
"Subjective   Patient ID: Divine Banuelos is a 73 y.o. right hand dominant female  Follow-up of the Left Shoulder (Left shoulder is still hurting. She states injection helped somewhat, Meloxicam helped a little. She does not like to take medicine. )         History of Present Illness  Patient is following up at his scheduled appointment in regards to left shoulder pain secondary to osteoarthritis.  She did receive a cortisone injection in our office glenohumeral 1/31/2019.  She states it helps to some degree she still has positional pain.  She denies numbness or tingling.  She states she takes the meloxicam \"sporadically\"  Pain Score: 3  Pain Location: Shoulder  Pain Orientation: Left     Pain Descriptors: Aching, Dull  Pain Frequency: Constant/continuous  Pain Onset: Ongoing     Clinical Progression: Gradually improving  Aggravating Factors: (raising arm over head)        Pain Intervention(s): Home medication(Meloxicam, heat packs)  Result of Injury: No       Past Medical History:   Diagnosis Date   • Anxiety    • Arthritis    • Bell's palsy    • Depression    • Hyperlipidemia    • Hypertension    • Hypothyroidism    • Osteoporosis         Past Surgical History:   Procedure Laterality Date   • CATARACT EXTRACTION Bilateral    • CATARACT EXTRACTION, BILATERAL     • RETINAL DETACHMENT REPAIR         Family History   Problem Relation Age of Onset   • Hypertension Mother    • Colon cancer Father    • Colon cancer Paternal Grandmother    • Colon cancer Paternal Uncle    • Breast cancer Maternal Aunt        Social History     Socioeconomic History   • Marital status:      Spouse name: Not on file   • Number of children: Not on file   • Years of education: Not on file   • Highest education level: Not on file   Social Needs   • Financial resource strain: Not on file   • Food insecurity - worry: Not on file   • Food insecurity - inability: Not on file   • Transportation needs - medical: Not on file   • Transportation " needs - non-medical: Not on file   Occupational History   • Not on file   Tobacco Use   • Smoking status: Never Smoker   • Smokeless tobacco: Never Used   Substance and Sexual Activity   • Alcohol use: No   • Drug use: No   • Sexual activity: Defer     Birth control/protection: Post-menopausal   Other Topics Concern   • Not on file   Social History Narrative    Right hand dominant         Current Outpatient Medications:   •  aspirin 81 MG EC tablet, Take 81 mg by mouth daily., Disp: , Rfl:   •  B Complex Vitamins (VITAMIN B COMPLEX) capsule capsule, Take 1 capsule by mouth Daily., Disp: , Rfl:   •  Calcium Citrate (CITRACAL PO), Take  by mouth daily. + Magnesium, Disp: , Rfl:   •  CloNIDine (CATAPRES) 0.1 MG tablet, Take 1 tablet by mouth 3 (Three) Times a Day As Needed for high blood pressure (SBP> 150 and/or DBP>90)., Disp: 30 tablet, Rfl: 11  •  levothyroxine (SYNTHROID, LEVOTHROID) 25 MCG tablet, Take 25 mcg by mouth daily., Disp: , Rfl:   •  meloxicam (MOBIC) 15 MG tablet, Take 1 tablet by mouth As Needed for Moderate Pain ., Disp: 30 tablet, Rfl: 1  •  Misc Natural Products (OSTEO BI-FLEX TRIPLE STRENGTH PO), Take 1 tablet by mouth Daily., Disp: , Rfl:   •  Misc. Devices (GELLHORN PESSARY) misc, Use as directed (Size 3), Disp: 1 each, Rfl: 0  •  Omega-3 Fatty Acids (FISH OIL) 1200 MG capsule capsule, Take 1,200 mg by mouth 2 (two) times a day., Disp: , Rfl:   •  PARoxetine (PAXIL) 20 MG tablet, Take 1 tablet by mouth Every Morning., Disp: 90 tablet, Rfl: 3    No Known Allergies    Review of Systems   Constitutional: Negative for fever.   HENT: Negative for voice change.    Eyes: Negative for visual disturbance.   Respiratory: Negative for shortness of breath.    Cardiovascular: Negative for chest pain.   Gastrointestinal: Negative for abdominal distention and abdominal pain.   Genitourinary: Negative for dysuria.   Musculoskeletal: Positive for arthralgias. Negative for gait problem and joint swelling.   Skin:  "Negative for rash.   Neurological: Negative for speech difficulty.   Hematological: Does not bruise/bleed easily.   Psychiatric/Behavioral: Negative for confusion.       Objective   Resp 18   Ht 157.5 cm (62\")   Wt 72 kg (158 lb 12.8 oz)   LMP  (LMP Unknown)   BMI 29.04 kg/m²    Physical Exam   Constitutional: She appears well-nourished.   Pulmonary/Chest: Effort normal.   Musculoskeletal:        Left shoulder: She exhibits decreased range of motion, tenderness, crepitus and pain. She exhibits no deformity.        Left hand: She exhibits normal capillary refill. Normal sensation noted. She exhibits no thumb/finger opposition.   Neurological: She is alert.   Psychiatric: She has a normal mood and affect.   Vitals reviewed.    Left Shoulder Exam     Range of Motion   Left shoulder active abduction: 90.   Forward flexion: 100              Neurologic Exam           Assessment/Plan   Independent Review of Radiographic Studies:    No new imaging done today.  Reviewed with patient at a prior visit.      Procedures       Divine was seen today for follow-up.    Diagnoses and all orders for this visit:    Arthralgia of left shoulder region  -     Ambulatory Referral to Physical Therapy Evaluate and treat; Stretching, ROM, Strengthening    Primary osteoarthritis of left shoulder  -     Ambulatory Referral to Physical Therapy Evaluate and treat; Stretching, ROM, Strengthening    Shoulder impingement syndrome, left  -     Ambulatory Referral to Physical Therapy Evaluate and treat; Stretching, ROM, Strengthening    Neck pain  -     meloxicam (MOBIC) 15 MG tablet; Take 1 tablet by mouth As Needed for Moderate Pain .       Orthopedic activities reviewed and patient expressed appreciation  Discussion of orthopedic goals  Risk, benefits, and merits of treatment alternatives reviewed with the patient and questions answered  Physical therapy referral given  Take prescribed medications as instructed only as tolerated  Ice, heat, " and/or modalities as beneficial    Recommendations/Plan:  Exercise, medications, injections, other patient advice, and return appointment as noted.  Patient is encouraged to call or return for any issues or concerns.    Patient states she does not want to consider left shoulder arthroplasty at any cost.  However, I explained if she did decide to proceed we would need an MRI to decide whether reverse shoulder arthroplasty versus conventional arthroplasty would benefit her  Patient agreeable to call or return sooner for any concerns.

## 2019-03-14 ENCOUNTER — HOSPITAL ENCOUNTER (OUTPATIENT)
Dept: PHYSICAL THERAPY | Facility: HOSPITAL | Age: 74
Setting detail: THERAPIES SERIES
Discharge: HOME | End: 2019-03-14
Payer: MEDICARE

## 2019-03-14 PROCEDURE — 97110 THERAPEUTIC EXERCISES: CPT

## 2019-03-14 PROCEDURE — 97161 PT EVAL LOW COMPLEX 20 MIN: CPT

## 2019-03-14 PROCEDURE — 97535 SELF CARE MNGMENT TRAINING: CPT

## 2019-03-14 ASSESSMENT — PAIN DESCRIPTION - ORIENTATION: ORIENTATION: LEFT

## 2019-03-14 ASSESSMENT — PAIN SCALES - GENERAL: PAINLEVEL_OUTOF10: 5

## 2019-03-14 ASSESSMENT — PAIN - FUNCTIONAL ASSESSMENT: PAIN_FUNCTIONAL_ASSESSMENT: PREVENTS OR INTERFERES SOME ACTIVE ACTIVITIES AND ADLS

## 2019-03-14 ASSESSMENT — PAIN DESCRIPTION - PROGRESSION: CLINICAL_PROGRESSION: GRADUALLY WORSENING

## 2019-03-14 ASSESSMENT — PAIN DESCRIPTION - ONSET: ONSET: ON-GOING

## 2019-03-14 ASSESSMENT — PAIN DESCRIPTION - DESCRIPTORS: DESCRIPTORS: ACHING;DULL;SHARP;TIGHTNESS

## 2019-03-14 ASSESSMENT — PAIN DESCRIPTION - PAIN TYPE: TYPE: CHRONIC PAIN

## 2019-03-14 ASSESSMENT — PAIN DESCRIPTION - LOCATION: LOCATION: SHOULDER

## 2019-03-14 ASSESSMENT — PAIN DESCRIPTION - FREQUENCY: FREQUENCY: CONTINUOUS

## 2019-03-15 ASSESSMENT — PAIN DESCRIPTION - PROGRESSION: CLINICAL_PROGRESSION: GRADUALLY WORSENING

## 2019-03-20 ENCOUNTER — HOSPITAL ENCOUNTER (OUTPATIENT)
Dept: PHYSICAL THERAPY | Facility: HOSPITAL | Age: 74
Setting detail: THERAPIES SERIES
Discharge: HOME | End: 2019-03-20
Payer: MEDICARE

## 2019-03-20 PROCEDURE — 97140 MANUAL THERAPY 1/> REGIONS: CPT

## 2019-03-20 PROCEDURE — 97150 GROUP THERAPEUTIC PROCEDURES: CPT

## 2019-03-20 PROCEDURE — 97110 THERAPEUTIC EXERCISES: CPT

## 2019-03-21 ENCOUNTER — HOSPITAL ENCOUNTER (OUTPATIENT)
Dept: PHYSICAL THERAPY | Facility: HOSPITAL | Age: 74
Setting detail: THERAPIES SERIES
Discharge: HOME | End: 2019-03-21
Payer: MEDICARE

## 2019-03-21 ENCOUNTER — TELEPHONE (OUTPATIENT)
Dept: CARDIOLOGY | Facility: CLINIC | Age: 74
End: 2019-03-21

## 2019-03-21 PROCEDURE — 97150 GROUP THERAPEUTIC PROCEDURES: CPT

## 2019-03-21 PROCEDURE — 97140 MANUAL THERAPY 1/> REGIONS: CPT

## 2019-03-21 NOTE — TELEPHONE ENCOUNTER
GRACIA  She had one episode last week where her heart flip flopped 3 times. Denies soa,chest pain or lightheadedness. She wanted you to be aware.

## 2019-03-25 ENCOUNTER — HOSPITAL ENCOUNTER (OUTPATIENT)
Dept: PHYSICAL THERAPY | Facility: HOSPITAL | Age: 74
Setting detail: THERAPIES SERIES
Discharge: HOME | End: 2019-03-25
Payer: MEDICARE

## 2019-03-25 PROCEDURE — 97140 MANUAL THERAPY 1/> REGIONS: CPT

## 2019-03-25 PROCEDURE — 97150 GROUP THERAPEUTIC PROCEDURES: CPT

## 2019-03-25 PROCEDURE — 97110 THERAPEUTIC EXERCISES: CPT

## 2019-03-27 ENCOUNTER — HOSPITAL ENCOUNTER (OUTPATIENT)
Dept: PHYSICAL THERAPY | Facility: HOSPITAL | Age: 74
Setting detail: THERAPIES SERIES
Discharge: HOME | End: 2019-03-27
Payer: MEDICARE

## 2019-03-27 PROCEDURE — 97110 THERAPEUTIC EXERCISES: CPT

## 2019-03-27 PROCEDURE — 97150 GROUP THERAPEUTIC PROCEDURES: CPT

## 2019-03-27 PROCEDURE — 97140 MANUAL THERAPY 1/> REGIONS: CPT

## 2019-04-01 ENCOUNTER — HOSPITAL ENCOUNTER (OUTPATIENT)
Dept: PHYSICAL THERAPY | Facility: HOSPITAL | Age: 74
Setting detail: THERAPIES SERIES
Discharge: HOME | End: 2019-04-01
Payer: MEDICARE

## 2019-04-01 PROCEDURE — 97150 GROUP THERAPEUTIC PROCEDURES: CPT

## 2019-04-01 PROCEDURE — 97140 MANUAL THERAPY 1/> REGIONS: CPT

## 2019-04-04 ENCOUNTER — HOSPITAL ENCOUNTER (OUTPATIENT)
Dept: PHYSICAL THERAPY | Facility: HOSPITAL | Age: 74
Setting detail: THERAPIES SERIES
Discharge: HOME | End: 2019-04-04
Payer: MEDICARE

## 2019-04-04 DIAGNOSIS — M54.2 NECK PAIN: ICD-10-CM

## 2019-04-04 PROCEDURE — 97140 MANUAL THERAPY 1/> REGIONS: CPT

## 2019-04-04 PROCEDURE — 97110 THERAPEUTIC EXERCISES: CPT

## 2019-04-04 RX ORDER — MELOXICAM 15 MG/1
TABLET ORAL
Qty: 30 TABLET | Refills: 1 | Status: SHIPPED | OUTPATIENT
Start: 2019-04-04 | End: 2019-05-02

## 2019-04-04 NOTE — FLOWSHEET NOTE
Physical Therapy Daily Treatment Note   Date:  2019    TIme In:     [address] [facility] [address]                 Time Out:    [address] [facility]       Patient Name:  Ailyn Ascencio    :  1945  MRN: [id number]    Restrictions/Precautions:    Pertinent Medical History:  Medical/Treatment Diagnosis Information:    Left shoulder pain; joint stiffness, muscle weakness        Insurance/Certification information:    Medicare, [employer]  Physician Information:    Penny Canela PA-C   Plan of care signed (Y/N):    Visit# / total visits:    7/    G-Code (if applicable):      Date / Visit # G-Code Applied:         Progress Note: []  Yes  [x]  No  Next due by: Visit #10      Pain level:   0/10  Subjective: Pt reports her sh is feeling much better than it was last week but she has been doing her exercises and even cut grass yesterday. Objective:  Observation:   Test measurements:    Palpation:    Exercises:  Exercise Resistance/Repetitions Other comments   scap squeeze 2x15 4   Left bent over row 2x15 4   Pendulum x30/x30 4   Left sh jonah: flex, ext, ER,IR 10x5\" 4   Wand FF AROM-st 3x10 Pain free ROM only 4   T band ext/IR/ER 2x10 pumpkin pain free 4   HH depression x15 4   Wall posture x15 4                         Other Therapeutic Activities:      Manual Treatments:  Gr 1-2 jnt mobs, PROM L sh jnt gentle x 15'    Modalities:   MH on L shoulder; Cold pack on L elbow. 12  Min. Timed Code Treatment Minutes:  30    Total Treatment Minutes:  46    Treatment/Activity Tolerance:  [x] Patient tolerated treatment well [] Patient limited by fatigue  [] Patient limited by pain  [] Patient limited by other medical complications  [x] Other: Pt completed tx with no pain and mod to severe crepitus noted during left sh flex PROM.     Pain after treatment:      0/10    Prognosis: [x] Good [] Fair  [] Poor    Patient Requires Follow-up: [x] Yes  [] No    Plan:   [x] Continue per plan of care [] Alter current plan (see comments)  [] Plan of care initiated [] Hold pending MD visit [] Discharge    Plan for Next Session:        Electronically signed by:  Tan Lal PTA

## 2019-04-08 ENCOUNTER — HOSPITAL ENCOUNTER (OUTPATIENT)
Dept: PHYSICAL THERAPY | Facility: HOSPITAL | Age: 74
Setting detail: THERAPIES SERIES
Discharge: HOME | End: 2019-04-08
Payer: MEDICARE

## 2019-04-08 PROCEDURE — 97110 THERAPEUTIC EXERCISES: CPT

## 2019-04-08 PROCEDURE — 97140 MANUAL THERAPY 1/> REGIONS: CPT

## 2019-04-08 PROCEDURE — 97150 GROUP THERAPEUTIC PROCEDURES: CPT

## 2019-04-08 NOTE — FLOWSHEET NOTE
Physical Therapy Daily Treatment Note   Date:  2019    TIme In:     1501                 Time Out:    1600    Patient Name:  Jose Maldonado    :  1945  MRN: [id number]    Restrictions/Precautions:    Pertinent Medical History:  Medical/Treatment Diagnosis Information:    Left shoulder pain; joint stiffness, muscle weakness        Insurance/Certification information:    Medicare, [employer]  Physician Information:    Brisa Krause PA-C   Plan of care signed (Y/N):    Visit# / total visits:    8/    G-Code (if applicable):      Date / Visit # G-Code Applied:         Progress Note: []  Yes  [x]  No  Next due by: Visit #10      Pain level:   3/10  Subjective: Pt reports her sh is hurting today. Objective:  Observation:   Test measurements:    Palpation:    Exercises:  Exercise Resistance/Repetitions Other comments   scap squeeze 2x15 8   Left bent over row 2x15 8   Pendulum x30/x30 8   Left sh jonah: flex, ext, ER,IR 10x5\" 8   Wand FF AROM-st 3x10 Pain free ROM only 8   T band ext/IR/ER 2x10 pumpkin pain free 8   HH depression x15 8   Wall posture x15 8                         Other Therapeutic Activities:      Manual Treatments:  Gr 1-2 jnt mobs, PROM L sh jnt gentle x 15'    Modalities:   MH on L shoulder; Cold pack on L elbow. 12  Min. Timed Code Treatment Minutes:  25 and rest grouped    Total Treatment Minutes:  59    Treatment/Activity Tolerance:  [x] Patient tolerated treatment well [] Patient limited by fatigue  [] Patient limited by pain  [] Patient limited by other medical complications  [x] Other: Pt completed tx with mild pain.     Pain after treatment:      2/10    Prognosis: [x] Good [] Fair  [] Poor    Patient Requires Follow-up: [x] Yes  [] No    Plan:   [x] Continue per plan of care [] Alter current plan (see comments)  [] Plan of care initiated [] Hold pending MD visit [] Discharge    Plan for Next Session:        Electronically signed by:  Madhuri Fleming PTA

## 2019-04-11 ENCOUNTER — HOSPITAL ENCOUNTER (OUTPATIENT)
Dept: PHYSICAL THERAPY | Facility: HOSPITAL | Age: 74
Setting detail: THERAPIES SERIES
End: 2019-04-11
Payer: MEDICARE

## 2019-04-15 ENCOUNTER — HOSPITAL ENCOUNTER (OUTPATIENT)
Dept: PHYSICAL THERAPY | Facility: HOSPITAL | Age: 74
Setting detail: THERAPIES SERIES
Discharge: HOME | End: 2019-04-15
Payer: MEDICARE

## 2019-04-15 PROCEDURE — 97110 THERAPEUTIC EXERCISES: CPT

## 2019-04-17 ENCOUNTER — APPOINTMENT (OUTPATIENT)
Dept: PHYSICAL THERAPY | Facility: HOSPITAL | Age: 74
End: 2019-04-17
Payer: MEDICARE

## 2019-04-18 ENCOUNTER — HOSPITAL ENCOUNTER (OUTPATIENT)
Dept: PHYSICAL THERAPY | Facility: HOSPITAL | Age: 74
Setting detail: THERAPIES SERIES
Discharge: HOME | End: 2019-04-18
Payer: MEDICARE

## 2019-04-18 PROCEDURE — 97140 MANUAL THERAPY 1/> REGIONS: CPT

## 2019-04-18 PROCEDURE — 97110 THERAPEUTIC EXERCISES: CPT

## 2019-04-18 PROCEDURE — 97150 GROUP THERAPEUTIC PROCEDURES: CPT

## 2019-04-22 ENCOUNTER — HOSPITAL ENCOUNTER (OUTPATIENT)
Dept: PHYSICAL THERAPY | Facility: HOSPITAL | Age: 74
Setting detail: THERAPIES SERIES
Discharge: HOME | End: 2019-04-22
Payer: MEDICARE

## 2019-04-22 PROCEDURE — 97110 THERAPEUTIC EXERCISES: CPT

## 2019-04-22 PROCEDURE — 97140 MANUAL THERAPY 1/> REGIONS: CPT

## 2019-04-22 NOTE — FLOWSHEET NOTE
Physical Therapy Daily Note   Date:  2019    TIme In:    1356                  Time Out:  [address]    Patient Name:  Lita Putnam    :  1945  MRN: [id number]    Restrictions/Precautions:    Pertinent Medical History:  Medical/Treatment Diagnosis Information:  ·   Left shoulder pain; joint stiffness, muscle weakness      Insurance/Certification information:    Medicare, [facility]  Physician Information:    Lyn Bernstein PA-C    Plan of care signed (Y/N):    Visit# / total visits:    -Code (if applicable):      Date / Visit # G-Code Applied:         Progress Note: []  Yes  [x]  No  Next due by: 19      Pain level:   210  Subjective: Pt reports her sh hurt quite a bit yesterday but its feeling much better today. Objective:  Observation:   Test measurements:  Quick DASH: 36%. L shoulder: AROM  PROM  MMT   Flex: 0-66 deg (AA) 0-90 deg 3-/5   Abd: 0-60 deg 0-75 deg 3-/5   ER: 0-25 deg --  4/5   IR: 0-32 deg --  4+/5  Palpation:    Exercises:  Exercise Resistance/Repetitions Other comments   scap squeeze 2x15 22   Left bent over row 2x15 22   Pendulum x30/x30 22   Left sh jonah: flex, ext, ER,IR 10x5\" 22   Wand FF AROM-st 3x10 Pain free ROM only 22   T band ext/IR/ER 2x10 pumpkin pain free 22   HH depression x15 22   Wall posture x15 22                         Other Therapeutic Activities:      Manual Treatments:  Gr 1-2 jnt mobs, PROM L sh jnt gentle x 15'    Modalities:   MH on L shoulder; Cold pack on L elbow. 12  Min.       Timed Code Treatment Minutes:   39    Total Treatment Minutes:  51    Treatment/Activity Tolerance:  [x] Patient tolerated treatment well [] Patient limited by fatigue  [] Patient limited by pain  [] Patient limited by other medical complications  [x] Other: Pt completed tx with no pain and improved PROM of left sh.     Pain after treatment:    0 /10    Prognosis: [x] Good [] Fair  [] Poor    Patient Requires Follow-up: [x] Yes  [] No    Plan:  2x/week x 2-4 more weeks  [x] Continue per plan of care [] Alter current plan (see comments)  [] Plan of care initiated [] Hold pending MD visit [] Discharge    Plan for Next Session:      Goals  Long term goals  Time Frame for Long term goals : 3- 4 weeks  Long term goal 1: Independent with HEP. Long term goal 2: Achieve 4/5 left shoulder strength in MMG's - within available ROM. Long term goal 3: Achieve a Quick DASH score of 40.       Electronically signed by:  Kaylyn Lal PTA

## 2019-04-25 ENCOUNTER — HOSPITAL ENCOUNTER (OUTPATIENT)
Dept: PHYSICAL THERAPY | Facility: HOSPITAL | Age: 74
Setting detail: THERAPIES SERIES
Discharge: HOME | End: 2019-04-25
Payer: MEDICARE

## 2019-04-25 PROCEDURE — 97150 GROUP THERAPEUTIC PROCEDURES: CPT

## 2019-04-25 PROCEDURE — 97140 MANUAL THERAPY 1/> REGIONS: CPT

## 2019-04-25 NOTE — FLOWSHEET NOTE
Physical Therapy Daily Note   Date:  2019    TIme In:    1430                  Time Out:  1530    Patient Name:  Napoleon Fermin    :  1945  MRN: [id number]    Restrictions/Precautions:    Pertinent Medical History:  Medical/Treatment Diagnosis Information:  ·   Left shoulder pain; joint stiffness, muscle weakness      Insurance/Certification information:    Medicare, [facility]  Physician Information:    Laurie Singh PA-C    Plan of care signed (Y/N):    Visit# / total visits:    12/    G-Code (if applicable):      Date / Visit # G-Code Applied:         Progress Note: []  Yes  [x]  No  Next due by: 19      Pain level:   2/10  Subjective: Pt reports her pain is mostly in her shoulder, little in her back. Objective:  Observation:   Test measurements:  Quick DASH: 36%. L shoulder: AROM  PROM  MMT   Flex: 0-66 deg (AA) 0-90 deg 3-/5   Abd: 0-60 deg 0-75 deg 3-/5   ER: 0-25 deg --  4/5   IR: 0-32 deg --  4+/5  Palpation:    Exercises:  Exercise Resistance/Repetitions Other comments   scap squeeze 2x15 25   Left bent over row 2x15 25   Pendulum x30/x30 25   Left sh jonah: flex, ext, ER,IR 10x5\" 25   Wand FF AROM-st 3x10 Pain free ROM only 25   T band ext/IR/ER 2x10 pumpkin pain free 25   HH depression x15 25   Wall posture x15 25                         Other Therapeutic Activities:      Manual Treatments:  Gr 1-2 jnt mobs, PROM L sh jnt gentle x 15'    Modalities:   MH on L shoulder; Cold pack on L elbow. 12  Min. Timed Code Treatment Minutes:   40    Total Treatment Minutes:  60    Treatment/Activity Tolerance:  [x] Patient tolerated treatment well [] Patient limited by fatigue  [] Patient limited by pain  [] Patient limited by other medical complications  [x] Other: Pt completed tx with no pain.      Pain after treatment:    0 /10    Prognosis: [x] Good [] Fair  [] Poor    Patient Requires Follow-up: [x] Yes  [] No    Plan:  2x/week x 2-4 more weeks  [x] Continue per plan of care []

## 2019-04-29 ENCOUNTER — HOSPITAL ENCOUNTER (OUTPATIENT)
Dept: PHYSICAL THERAPY | Facility: HOSPITAL | Age: 74
Setting detail: THERAPIES SERIES
Discharge: HOME | End: 2019-04-29
Payer: MEDICARE

## 2019-04-29 PROCEDURE — 97140 MANUAL THERAPY 1/> REGIONS: CPT

## 2019-04-29 PROCEDURE — 97110 THERAPEUTIC EXERCISES: CPT

## 2019-04-29 PROCEDURE — 97150 GROUP THERAPEUTIC PROCEDURES: CPT

## 2019-04-29 NOTE — FLOWSHEET NOTE
Physical Therapy Daily Note   Date:  2019    TIme In:    1435                  Time Out:  [address]    Patient Name:  Aureliano Mederos    :  1945  MRN: [id number]    Restrictions/Precautions:    Pertinent Medical History:  Medical/Treatment Diagnosis Information:  ·   Left shoulder pain; joint stiffness, muscle weakness      Insurance/Certification information:    Medicare, [employer]  Physician Information:    Vidya Ambrosio PA-C    Plan of care signed (Y/N):    Visit# / total visits:    13/    G-Code (if applicable):      Date / Visit # G-Code Applied:         Progress Note: []  Yes  [x]  No  Next due by: 19      Pain level:   2/10  Subjective: Pt reports mild pain in her shoulder. Objective:  Observation:   Test measurements:  Quick DASH: 36%. L shoulder: AROM  PROM  MMT   Flex: 0-66 deg (AA) 0-90 deg 3-/5   Abd: 0-60 deg 0-75 deg 3-/5   ER: 0-25 deg --  4/5   IR: 0-32 deg --  4+/5  Palpation:    Exercises:  Exercise Resistance/Repetitions Other comments   scap squeeze 2x15 29   Left bent over row 2x15 29   Pendulum x30/x30 29   Left sh jonah: flex, ext, ER,IR 10x5\" 29   Wand FF AROM-st 3x10 Pain free ROM only 29   T band ext/IR/ER 2x10 pumpkin pain free 29   HH depression x15 29   Wall posture x15 29                         Other Therapeutic Activities:      Manual Treatments:  Gr 1-2 jnt mobs, PROM L sh jnt gentle x 15'    Modalities:   MH on L shoulder; Cold pack on L elbow. 12  Min. Timed Code Treatment Minutes:   40    Total Treatment Minutes:  60    Treatment/Activity Tolerance:  [x] Patient tolerated treatment well [] Patient limited by fatigue  [] Patient limited by pain  [] Patient limited by other medical complications  [x] Other: Pt completed tx with no pain.      Pain after treatment:    0 /10    Prognosis: [x] Good [] Fair  [] Poor    Patient Requires Follow-up: [x] Yes  [] No    Plan:  2x/week x 2-4 more weeks  [x] Continue per plan of care [] Alter current plan (see comments)  [] Plan of care initiated [] Hold pending MD visit [] Discharge    Plan for Next Session:      Goals  Long term goals  Time Frame for Long term goals : 3- 4 weeks  Long term goal 1: Independent with HEP. Long term goal 2: Achieve 4/5 left shoulder strength in MMG's - within available ROM. Long term goal 3: Achieve a Quick DASH score of 40.       Electronically signed by:  Lucina Harris PTA

## 2019-05-02 ENCOUNTER — OFFICE VISIT (OUTPATIENT)
Dept: ORTHOPEDIC SURGERY | Facility: CLINIC | Age: 74
End: 2019-05-02

## 2019-05-02 VITALS — BODY MASS INDEX: 29.08 KG/M2 | HEIGHT: 62 IN | WEIGHT: 158 LBS | RESPIRATION RATE: 18 BRPM

## 2019-05-02 DIAGNOSIS — M19.012 PRIMARY OSTEOARTHRITIS OF LEFT SHOULDER: ICD-10-CM

## 2019-05-02 DIAGNOSIS — M25.512 ARTHRALGIA OF LEFT SHOULDER REGION: ICD-10-CM

## 2019-05-02 DIAGNOSIS — M75.42 IMPINGEMENT SYNDROME OF LEFT SHOULDER: Primary | ICD-10-CM

## 2019-05-02 PROCEDURE — 20610 DRAIN/INJ JOINT/BURSA W/O US: CPT | Performed by: PHYSICIAN ASSISTANT

## 2019-05-02 RX ORDER — METHYLPREDNISOLONE ACETATE 40 MG/ML
40 INJECTION, SUSPENSION INTRA-ARTICULAR; INTRALESIONAL; INTRAMUSCULAR; SOFT TISSUE
Status: COMPLETED | OUTPATIENT
Start: 2019-05-02 | End: 2019-05-02

## 2019-05-02 RX ORDER — LIDOCAINE HYDROCHLORIDE 10 MG/ML
2 INJECTION, SOLUTION INFILTRATION; PERINEURAL
Status: COMPLETED | OUTPATIENT
Start: 2019-05-02 | End: 2019-05-02

## 2019-05-02 RX ADMIN — LIDOCAINE HYDROCHLORIDE 2 ML: 10 INJECTION, SOLUTION INFILTRATION; PERINEURAL at 13:05

## 2019-05-02 RX ADMIN — METHYLPREDNISOLONE ACETATE 40 MG: 40 INJECTION, SUSPENSION INTRA-ARTICULAR; INTRALESIONAL; INTRAMUSCULAR; SOFT TISSUE at 13:05

## 2019-05-02 NOTE — PROGRESS NOTES
Subjective   Patient ID: Divine Banuelos is a 73 y.o. right hand dominant female  Pain of the Left Shoulder (Increased pain after physical therapy)         History of Present Illness    Patient presents for a cortisone injection into the left SHOULDER INJECTION.  She states the last cortisone injection provided relief. She is currently enrolled in PT and has noticed some improvement.     Past Medical History:   Diagnosis Date   • Anxiety    • Arthritis    • Bell's palsy    • Depression    • Hyperlipidemia    • Hypertension    • Hypothyroidism    • Osteoporosis         Past Surgical History:   Procedure Laterality Date   • CATARACT EXTRACTION Bilateral    • CATARACT EXTRACTION, BILATERAL     • RETINAL DETACHMENT REPAIR         Family History   Problem Relation Age of Onset   • Hypertension Mother    • Colon cancer Father    • Colon cancer Paternal Grandmother    • Colon cancer Paternal Uncle    • Breast cancer Maternal Aunt        Social History     Socioeconomic History   • Marital status:      Spouse name: Not on file   • Number of children: Not on file   • Years of education: Not on file   • Highest education level: Not on file   Tobacco Use   • Smoking status: Never Smoker   • Smokeless tobacco: Never Used   Substance and Sexual Activity   • Alcohol use: No   • Drug use: No   • Sexual activity: Defer     Birth control/protection: Post-menopausal   Social History Narrative    Right hand dominant         Current Outpatient Medications:   •  aspirin 81 MG EC tablet, Take 81 mg by mouth daily., Disp: , Rfl:   •  B Complex Vitamins (VITAMIN B COMPLEX) capsule capsule, Take 1 capsule by mouth Daily., Disp: , Rfl:   •  Calcium Citrate (CITRACAL PO), Take  by mouth daily. + Magnesium, Disp: , Rfl:   •  CloNIDine (CATAPRES) 0.1 MG tablet, Take 1 tablet by mouth 3 (Three) Times a Day As Needed for high blood pressure (SBP> 150 and/or DBP>90)., Disp: 30 tablet, Rfl: 11  •  Garlic (ODOR FREE GARLIC) 100 MG tablet,  "Take 1 tablet by mouth., Disp: , Rfl:   •  levothyroxine (SYNTHROID, LEVOTHROID) 25 MCG tablet, Take 25 mcg by mouth daily., Disp: , Rfl:   •  Misc Natural Products (OSTEO BI-FLEX TRIPLE STRENGTH PO), Take 1 tablet by mouth Daily., Disp: , Rfl:   •  Misc. Devices (GELLHORN PESSARY) misc, Use as directed (Size 3), Disp: 1 each, Rfl: 0  •  Omega-3 Fatty Acids (FISH OIL) 1200 MG capsule capsule, Take 1,200 mg by mouth 2 (two) times a day., Disp: , Rfl:   •  PARoxetine (PAXIL) 20 MG tablet, Take 1 tablet by mouth Every Morning., Disp: 90 tablet, Rfl: 3    No Known Allergies    Review of Systems   Constitutional: Negative for diaphoresis, fever and unexpected weight change.   HENT: Negative for dental problem and sore throat.    Eyes: Negative for visual disturbance.   Respiratory: Negative for shortness of breath.    Cardiovascular: Negative for chest pain.   Gastrointestinal: Negative for abdominal pain, constipation, diarrhea, nausea and vomiting.   Genitourinary: Negative for difficulty urinating and frequency.   Musculoskeletal: Positive for arthralgias (left shoulder).   Neurological: Negative for headaches.   Hematological: Does not bruise/bleed easily.       Objective   Resp 18   Ht 157.5 cm (62\")   Wt 71.7 kg (158 lb)   LMP  (LMP Unknown)   BMI 28.90 kg/m²    Physical Exam   Constitutional: She is oriented to person, place, and time. She appears well-developed.   Pulmonary/Chest: Effort normal.   Musculoskeletal:        Left shoulder: She exhibits decreased range of motion, tenderness and pain. She exhibits no deformity.   Neurological: She is alert and oriented to person, place, and time.   Skin: Capillary refill takes less than 2 seconds.   Vitals reviewed.    Left Shoulder Exam     Range of Motion   Active abduction: 110   Forward flexion: 120     Muscle Strength   The patient has normal left shoulder strength.             Neurologic Exam     Mental Status   Oriented to person, place, and time.    "           Assessment/Plan   Independent Review of Radiographic Studies:    No new imaging done today.      Large Joint Arthrocentesis: L glenohumeral  Date/Time: 5/2/2019 1:05 PM  Consent given by: patient  Site marked: site marked  Supporting Documentation  Indications: pain   Procedure Details  Location: shoulder - L glenohumeral  Preparation: Patient was prepped and draped in the usual sterile fashion  Needle size: 22 G  Approach: posterior  Medications administered: 2 mL lidocaine 1 %; 40 mg methylPREDNISolone acetate 40 MG/ML  Patient tolerance: patient tolerated the procedure well with no immediate complications             Divine was seen today for pain.    Diagnoses and all orders for this visit:    Impingement syndrome of left shoulder  -     Large Joint Arthrocentesis: L glenohumeral    Primary osteoarthritis of left shoulder    Arthralgia of left shoulder region       Orthopedic activities reviewed and patient expressed appreciation  Discussion of orthopedic goals  Risk, benefits, and merits of treatment alternatives reviewed with the patient and questions answered  Call or notify for any adverse effect from injection therapy    Recommendations/Plan:  Exercise, medications, injections, other patient advice, and return appointment as noted.  Patient is encouraged to call or return for any issues or concerns.    FU in 3 months    Patient agreeable to call or return sooner for any concerns.

## 2019-05-10 ENCOUNTER — APPOINTMENT (OUTPATIENT)
Dept: PHYSICAL THERAPY | Facility: HOSPITAL | Age: 74
End: 2019-05-10
Payer: MEDICARE

## 2019-05-13 ENCOUNTER — HOSPITAL ENCOUNTER (OUTPATIENT)
Dept: PHYSICAL THERAPY | Facility: HOSPITAL | Age: 74
Setting detail: THERAPIES SERIES
Discharge: HOME | End: 2019-05-13
Payer: MEDICARE

## 2019-05-13 PROCEDURE — 97140 MANUAL THERAPY 1/> REGIONS: CPT

## 2019-05-13 PROCEDURE — 97150 GROUP THERAPEUTIC PROCEDURES: CPT

## 2019-05-13 NOTE — FLOWSHEET NOTE
1.5/10    Prognosis: [x] Good [] Fair  [] Poor    Patient Requires Follow-up: [x] Yes  [] No    Plan:  2x/week x 2-4 more weeks  [x] Continue per plan of care [] Alter current plan (see comments)  [] Plan of care initiated [] Hold pending MD visit [] Discharge    Plan for Next Session:      Goals  Long term goals  Time Frame for Long term goals : 3- 4 weeks  Long term goal 1: Independent with HEP. - met  Long term goal 2: Achieve 4/5 left shoulder strength in MMG's - within available ROM.  - met  Long term goal 3: Achieve a Quick DASH score of 40. - not met    Patient is showing gradual, steady progress      Electronically signed by:  Buzz Dillon PTA      Electronically signed by Immanuel Nelson PT on 5/16/2019 at 1:32 PM

## 2019-05-15 ENCOUNTER — APPOINTMENT (OUTPATIENT)
Dept: PHYSICAL THERAPY | Facility: HOSPITAL | Age: 74
End: 2019-05-15
Payer: MEDICARE

## 2019-05-20 ENCOUNTER — HOSPITAL ENCOUNTER (OUTPATIENT)
Dept: PHYSICAL THERAPY | Facility: HOSPITAL | Age: 74
Setting detail: THERAPIES SERIES
Discharge: HOME | End: 2019-05-20
Payer: MEDICARE

## 2019-05-20 PROCEDURE — 97140 MANUAL THERAPY 1/> REGIONS: CPT

## 2019-05-20 PROCEDURE — 97150 GROUP THERAPEUTIC PROCEDURES: CPT

## 2019-05-20 PROCEDURE — 97110 THERAPEUTIC EXERCISES: CPT

## 2019-05-20 NOTE — FLOWSHEET NOTE
Physical Therapy Daily Note   Date:  2019    TIme In:   1458                  Time Out:  [address]    Patient Name:  Darylene Stands    :  1945  MRN: [id number]    Restrictions/Precautions:    Pertinent Medical History:  Medical/Treatment Diagnosis Information:  ·   Left shoulder pain; joint stiffness, muscle weakness      Insurance/Certification information:    Medicare, [facility]  Physician Information:    Guido Perez PA-C    Plan of care signed (Y/N):    Visit# / total visits:    15/    G-Code (if applicable):      Date / Visit # G-Code Applied:         Progress Note: []  Yes  [x]  No  Next due by: 19      Pain level:  1/10  Subjective: Pt reports having minimal pain today; no new complaints. Objective:  Observation:   Test measurements:  Quick DASH: 55%. L shoulder: AROM  PROM  MMT   Flex: 0-121 deg        0-90 deg 4/5   Abd: 0-76 deg 0-75 deg 4+/5   ER: 0-45 deg --  4/5   IR: 0-56 deg --  4+/5  Palpation:    Exercises:  Exercise Resistance/Repetitions Other comments   Mid row 2x15 lime 20   Left bent over row 2x15 2# 20   Pendulum x30/x30 20   Left sh jonah: flex, ext, ER,IR 10x5\" 20   Wand FF AROM-st 3x10 Pain free ROM only 20   T band ext/IR/ER 2x10 pumpkin pain free 20   HH depression x15 20   Wall posture x15 20   UBE L3 3'/3' 20                    Other Therapeutic Activities:      Manual Treatments:  Gr 1-2 jnt mobs, PROM L sh jnt gentle x 12' - performed by Emil Milton PTA    Modalities:   MH on L shoulder; Cold pack on L elbow. 12  Min. Not today per pt request.    Timed Code Treatment Minutes:  55 - 15 min group    Total Treatment Minutes:  55    Treatment/Activity Tolerance:  [x] Patient tolerated treatment well [] Patient limited by fatigue  [] Patient limited by pain  [] Patient limited by other medical complications  [x] Other: Pt progressed well through activity. She continues to have crepitus with PROM but had 0/10 pain after treatment today.       Pain after treatment:     0/10    Prognosis: [x] Good [] Fair  [] Poor    Patient Requires Follow-up: [x] Yes  [] No    Plan:  2x/week x 2-4 more weeks  [x] Continue per plan of care [] Alter current plan (see comments)  [] Plan of care initiated [] Hold pending MD visit [] Discharge    Plan for Next Session:      Goals  Long term goals  Time Frame for Long term goals : 3- 4 weeks  Long term goal 1: Independent with HEP. - met  Long term goal 2: Achieve 4/5 left shoulder strength in MMG's - within available ROM.  - met  Long term goal 3: Achieve a Quick DASH score of 40. - not met      Electronically signed by:  Joan Coronel PT      Electronically signed by Joan Coronel PT on 3/77/2035 at 5:28 PM

## 2019-05-22 ENCOUNTER — APPOINTMENT (OUTPATIENT)
Dept: PHYSICAL THERAPY | Facility: HOSPITAL | Age: 74
End: 2019-05-22
Payer: MEDICARE

## 2019-05-29 ENCOUNTER — HOSPITAL ENCOUNTER (OUTPATIENT)
Dept: PHYSICAL THERAPY | Facility: HOSPITAL | Age: 74
Setting detail: THERAPIES SERIES
Discharge: HOME | End: 2019-05-29
Payer: MEDICARE

## 2019-05-29 PROCEDURE — 97140 MANUAL THERAPY 1/> REGIONS: CPT

## 2019-05-29 PROCEDURE — 97150 GROUP THERAPEUTIC PROCEDURES: CPT

## 2019-05-29 NOTE — FLOWSHEET NOTE
Physical Therapy Daily Note   Date:  2019    TIme In:        1503             Time Out:  1550    Patient Name:  Ailyn Ascencio    :  1945  MRN: [id number]    Restrictions/Precautions:    Pertinent Medical History:  Medical/Treatment Diagnosis Information:  ·   Left shoulder pain; joint stiffness, muscle weakness      Insurance/Certification information:    Medicare, [facility]  Physician Information:    Penny Canela PA-C    Plan of care signed (Y/N):    Visit# / total visits:    16/    G-Code (if applicable):      Date / Visit # G-Code Applied:         Progress Note: []  Yes  [x]  No  Next due by: 19      Pain level:  2/10  Subjective: Pt reports her sh is popping and cracking today but she did  Notice that she was able to use her push mower and it didn't hurt that arm. Objective:  Observation:   Test measurements:  Quick DASH: 55%. L shoulder: AROM  PROM  MMT   Flex: 0-121 deg        0-90 deg 4/5   Abd: 0-76 deg 0-75 deg 4+/5   ER: 0-45 deg --  4/5   IR: 0-56 deg --  4+/5  Palpation:    Exercises:  Exercise Resistance/Repetitions Other comments   Mid row 2x15 lime 29   Left bent over row 2x15 2# 29   Pendulum x30/x30 29   Left sh jonah: flex, ext, ER,IR 10x5\" 29   Wand FF AROM-st 3x10 Pain free ROM only 29   T band ext/IR/ER 2x10 pumpkin pain free 29   HH depression x15 29   Wall posture x15 29   UBE L3 3'/3' 29                    Other Therapeutic Activities:      Manual Treatments:  Gr 1-2 jnt mobs, PROM L sh jnt gentle x 12' -    Modalities:   MH on L shoulder; Cold pack on L elbow. 12  Min. Not today per pt request.    Timed Code Treatment Minutes:  45    Total Treatment Minutes:  47    Treatment/Activity Tolerance:  [x] Patient tolerated treatment well [] Patient limited by fatigue  [] Patient limited by pain  [] Patient limited by other medical complications  [x] Other: Pt completed tx with no pain and a more smooth motion during PROM today with less crepitus.      Pain after

## 2019-06-03 ENCOUNTER — APPOINTMENT (OUTPATIENT)
Dept: PHYSICAL THERAPY | Facility: HOSPITAL | Age: 74
End: 2019-06-03
Payer: MEDICARE

## 2019-06-05 ENCOUNTER — APPOINTMENT (OUTPATIENT)
Dept: PHYSICAL THERAPY | Facility: HOSPITAL | Age: 74
End: 2019-06-05
Payer: MEDICARE

## 2019-06-05 ENCOUNTER — HOSPITAL ENCOUNTER (OUTPATIENT)
Facility: HOSPITAL | Age: 74
Discharge: HOME | End: 2019-06-05
Payer: MEDICARE

## 2019-06-05 LAB
A/G RATIO: 1.6 (ref 0.8–2)
ALBUMIN SERPL-MCNC: 4.1 G/DL (ref 3.4–4.8)
ALP BLD-CCNC: 96 U/L (ref 25–100)
ALT SERPL-CCNC: 14 U/L (ref 4–36)
ANION GAP SERPL CALCULATED.3IONS-SCNC: 13 MMOL/L (ref 3–16)
AST SERPL-CCNC: 19 U/L (ref 8–33)
BASOPHILS ABSOLUTE: 0 K/UL (ref 0–0.1)
BASOPHILS RELATIVE PERCENT: 0.6 %
BILIRUB SERPL-MCNC: 0.4 MG/DL (ref 0.3–1.2)
BUN BLDV-MCNC: 14 MG/DL (ref 6–20)
CALCIUM SERPL-MCNC: 9.6 MG/DL (ref 8.5–10.5)
CHLORIDE BLD-SCNC: 104 MMOL/L (ref 98–107)
CHOLESTEROL, TOTAL: 224 MG/DL (ref 0–200)
CO2: 25 MMOL/L (ref 20–30)
CREAT SERPL-MCNC: 0.8 MG/DL (ref 0.4–1.2)
EOSINOPHILS ABSOLUTE: 0.2 K/UL (ref 0–0.4)
EOSINOPHILS RELATIVE PERCENT: 4.7 %
GFR AFRICAN AMERICAN: >59
GFR NON-AFRICAN AMERICAN: >60
GLOBULIN: 2.6 G/DL
GLUCOSE BLD-MCNC: 100 MG/DL (ref 74–106)
HCT VFR BLD CALC: 43.5 % (ref 37–47)
HDLC SERPL-MCNC: 67 MG/DL (ref 40–60)
HEMOGLOBIN: 13.9 G/DL (ref 11.5–16.5)
IMMATURE GRANULOCYTES #: 0 K/UL
IMMATURE GRANULOCYTES %: 0.2 % (ref 0–5)
LDL CHOLESTEROL CALCULATED: 143 MG/DL
LYMPHOCYTES ABSOLUTE: 1.4 K/UL (ref 1.5–4)
LYMPHOCYTES RELATIVE PERCENT: 27.8 %
MCH RBC QN AUTO: 29.4 PG (ref 27–32)
MCHC RBC AUTO-ENTMCNC: 32 G/DL (ref 31–35)
MCV RBC AUTO: 92 FL (ref 80–100)
MONOCYTES ABSOLUTE: 0.4 K/UL (ref 0.2–0.8)
MONOCYTES RELATIVE PERCENT: 8.6 %
NEUTROPHILS ABSOLUTE: 3 K/UL (ref 2–7.5)
NEUTROPHILS RELATIVE PERCENT: 58.1 %
PDW BLD-RTO: 13 % (ref 11–16)
PLATELET # BLD: 341 K/UL (ref 150–400)
PMV BLD AUTO: 9.8 FL (ref 6–10)
POTASSIUM SERPL-SCNC: 4.5 MMOL/L (ref 3.4–5.1)
RBC # BLD: 4.73 M/UL (ref 3.8–5.8)
SODIUM BLD-SCNC: 142 MMOL/L (ref 136–145)
TOTAL PROTEIN: 6.7 G/DL (ref 6.4–8.3)
TRIGL SERPL-MCNC: 71 MG/DL (ref 0–249)
TSH SERPL DL<=0.05 MIU/L-ACNC: 4.81 UIU/ML (ref 0.35–5.5)
VLDLC SERPL CALC-MCNC: 14 MG/DL
WBC # BLD: 5.1 K/UL (ref 4–11)

## 2019-06-05 PROCEDURE — 36415 COLL VENOUS BLD VENIPUNCTURE: CPT

## 2019-06-05 PROCEDURE — 84443 ASSAY THYROID STIM HORMONE: CPT

## 2019-06-05 PROCEDURE — 80061 LIPID PANEL: CPT

## 2019-06-05 PROCEDURE — 80053 COMPREHEN METABOLIC PANEL: CPT

## 2019-06-05 PROCEDURE — 85025 COMPLETE CBC W/AUTO DIFF WBC: CPT

## 2019-06-07 ENCOUNTER — HOSPITAL ENCOUNTER (OUTPATIENT)
Dept: PHYSICAL THERAPY | Facility: HOSPITAL | Age: 74
Setting detail: THERAPIES SERIES
Discharge: HOME | End: 2019-06-07
Payer: MEDICARE

## 2019-06-07 PROCEDURE — 97110 THERAPEUTIC EXERCISES: CPT

## 2019-06-07 PROCEDURE — 97140 MANUAL THERAPY 1/> REGIONS: CPT

## 2019-06-07 NOTE — FLOWSHEET NOTE
Poor    Patient Requires Follow-up: [x] Yes  [] No    Plan:  2x/week x 1 more week    [x] Continue per plan of care [] Alter current plan (see comments)  [] Plan of care initiated [] Hold pending MD visit [] Discharge    Plan for Next Session:      Goals  Long term goals  Time Frame for Long term goals : 3- 4 weeks  Long term goal 1: Independent with HEP. - met  Long term goal 2: Achieve 4/5 left shoulder strength in MMG's - within available ROM.  - met  Long term goal 3: Achieve a Quick DASH score of 40. - not met      Electronically signed by:  Electronically signed by Josey Saxena PT on 6/7/2019 at 12:30 PM

## 2019-06-10 ENCOUNTER — HOSPITAL ENCOUNTER (OUTPATIENT)
Dept: PHYSICAL THERAPY | Facility: HOSPITAL | Age: 74
Setting detail: THERAPIES SERIES
Discharge: HOME | End: 2019-06-10
Payer: MEDICARE

## 2019-06-10 ENCOUNTER — TELEPHONE (OUTPATIENT)
Dept: SURGERY | Facility: CLINIC | Age: 74
End: 2019-06-10

## 2019-06-10 PROCEDURE — 97110 THERAPEUTIC EXERCISES: CPT

## 2019-06-10 PROCEDURE — 97150 GROUP THERAPEUTIC PROCEDURES: CPT

## 2019-06-10 NOTE — FLOWSHEET NOTE
Physical Therapy Daily Note / Discharge Note   Date:  6/10/2019    TIme In:     1428                Time Out:  [address]    Patient Name:  Aylin Monique    :  1945  MRN: [id number]    Restrictions/Precautions:    Pertinent Medical History:  Medical/Treatment Diagnosis Information:  ·   Left shoulder pain; joint stiffness, muscle weakness      Insurance/Certification information:    Medicare, [facility]  Physician Information:    Argentina Cheung PA-C    Plan of care signed (Y/N):    Visit# / total visits:    18 /    G-Code (if applicable):      Date / Visit # G-Code Applied:         Progress Note: []  Yes  [x]  No  Next due by: 19      Pain level:  2/10    Subjective: Pt reports: shoulder is sore; feels she has improved a lot since beginning PT     Objective:  Observation:   Test measurements:  Quick DASH: 55%. L shoulder: AROM  PROM  MMT   Flex: 0-121 deg        0-90 deg 4/5   Abd: 0-76 deg 0-75 deg 4+/5   ER: 0-45 deg --  4/5   IR: 0-56 deg --  4+/5  Palpation:    Exercises:  Exercise Resistance/Repetitions Other comments   Mid row 2x15 red 10   Left bent over row 2x15 2# 10   Pendulum x30/x30 10   Left sh jonah: flex, ext, ER,IR 10x5\" 10   Wand FF AROM-st 3x10 Pain free ROM only 10   T band ext/IR/ER 2x10 pumpkin pain free 10   HH depression x15 10   Wall posture x15 10   UBE L3 3'/3' 10   Pulleys: FF * 3' 10               Other Therapeutic Activities:      Manual Treatments:  Gr 1-2 jnt mobs, PROM L sh jnt gentle x 12' - not today    Modalities:   MH on L shoulder; Cold pack on L elbow. 12  Min. Not today per pt request.    Timed Code Treatment Minutes:  40    Total Treatment Minutes:  55'    Treatment/Activity Tolerance:  [x] Patient tolerated treatment well [] Patient limited by fatigue  [] Patient limited by pain  [] Patient limited by other medical complications  [x] Other: Pt completed tx with no pain and a more smooth motion during PROM today with less crepitus.      Pain after treatment:

## 2019-06-13 ENCOUNTER — APPOINTMENT (OUTPATIENT)
Dept: PHYSICAL THERAPY | Facility: HOSPITAL | Age: 74
End: 2019-06-13
Payer: MEDICARE

## 2019-07-22 ENCOUNTER — HOSPITAL ENCOUNTER (OUTPATIENT)
Facility: HOSPITAL | Age: 74
Discharge: HOME | End: 2019-07-22
Payer: MEDICARE

## 2019-07-22 ENCOUNTER — HOSPITAL ENCOUNTER (OUTPATIENT)
Dept: GENERAL RADIOLOGY | Facility: HOSPITAL | Age: 74
Discharge: HOME | End: 2019-07-22
Payer: MEDICARE

## 2019-07-22 DIAGNOSIS — R05.9 COUGH: ICD-10-CM

## 2019-07-22 PROCEDURE — 71046 X-RAY EXAM CHEST 2 VIEWS: CPT

## 2019-08-24 ENCOUNTER — HOSPITAL ENCOUNTER (EMERGENCY)
Facility: HOSPITAL | Age: 74
Discharge: HOME | End: 2019-08-24
Attending: FAMILY MEDICINE
Payer: MEDICARE

## 2019-08-24 ENCOUNTER — APPOINTMENT (OUTPATIENT)
Dept: CT IMAGING | Facility: HOSPITAL | Age: 74
End: 2019-08-24
Payer: MEDICARE

## 2019-08-24 VITALS
BODY MASS INDEX: 27.11 KG/M2 | DIASTOLIC BLOOD PRESSURE: 64 MMHG | TEMPERATURE: 97.5 F | WEIGHT: 153 LBS | HEIGHT: 63 IN | SYSTOLIC BLOOD PRESSURE: 177 MMHG | OXYGEN SATURATION: 98 % | HEART RATE: 53 BPM | RESPIRATION RATE: 20 BRPM

## 2019-08-24 DIAGNOSIS — S05.12XA ORBITAL CONTUSION, LEFT, INITIAL ENCOUNTER: Primary | ICD-10-CM

## 2019-08-24 PROCEDURE — 99283 EMERGENCY DEPT VISIT LOW MDM: CPT

## 2019-08-24 PROCEDURE — 70450 CT HEAD/BRAIN W/O DYE: CPT

## 2019-08-24 RX ORDER — PAROXETINE 10 MG/1
10 TABLET, FILM COATED ORAL EVERY MORNING
COMMUNITY

## 2019-08-24 ASSESSMENT — ENCOUNTER SYMPTOMS: COLOR CHANGE: 1

## 2019-08-24 ASSESSMENT — PAIN SCALES - GENERAL: PAINLEVEL_OUTOF10: 2

## 2019-08-24 ASSESSMENT — PAIN DESCRIPTION - DESCRIPTORS: DESCRIPTORS: SORE

## 2019-08-24 ASSESSMENT — PAIN DESCRIPTION - PAIN TYPE: TYPE: ACUTE PAIN

## 2019-08-24 ASSESSMENT — PAIN DESCRIPTION - LOCATION: LOCATION: HEAD

## 2019-08-24 ASSESSMENT — PAIN DESCRIPTION - ORIENTATION: ORIENTATION: LEFT

## 2019-08-25 NOTE — ED NOTES
Pt. Discharged home with take home instructions indicates understands instructions. To keep a diary of Blood pressures recorded for future doctor visits [id number] Birgit Martin with MD for discharge.      Luther Willoughby RN  08/24/19 [id number]

## 2019-08-25 NOTE — ED PROVIDER NOTES
(CALCITRATE) 950 MG tablet Take by mouth      levothyroxine (SYNTHROID) 25 MCG tablet Take 25 mcg by mouth      cloNIDine (CATAPRES) 0.1 MG tablet Take 0.1 mg by mouth      meloxicam (MOBIC) 15 MG tablet Take 15 mg by mouth       !! - Potential duplicate medications found. Please discuss with provider. ALLERGIES     Patient has no known allergies.     FAMILY HISTORY       Family History   Problem Relation Age of Onset    Heart Disease Mother     Heart Disease Father           SOCIAL HISTORY       Social History     Socioeconomic History    Marital status:      Spouse name: None    Number of children: None    Years of education: None    Highest education level: None   Occupational History    None   Social Needs    Financial resource strain: None    Food insecurity:     Worry: None     Inability: None    Transportation needs:     Medical: None     Non-medical: None   Tobacco Use    Smoking status: Never Smoker    Smokeless tobacco: Never Used   Substance and Sexual Activity    Alcohol use: No    Drug use: No    Sexual activity: None   Lifestyle    Physical activity:     Days per week: None     Minutes per session: None    Stress: None   Relationships    Social connections:     Talks on phone: None     Gets together: None     Attends [employer] service: None     Active member of club or organization: None     Attends meetings of clubs or organizations: None     Relationship status: None    Intimate partner violence:     Fear of current or ex partner: None     Emotionally abused: None     Physically abused: None     Forced sexual activity: None   Other Topics Concern    None   Social History Narrative    None       SCREENINGS             PHYSICAL EXAM    (up to 7 for level 4, 8 or more for level 5)     ED Triage Vitals [08/24/19 2019]   BP Temp Temp Source Pulse Resp SpO2 Height Weight   (!) 190/78 98.1 °F (36.7 °C) Oral 59 16 97 % 5' 3\" (1.6 m) 153 lb (69.4 kg)       Physical Exam

## 2019-08-25 NOTE — ED NOTES
Pt remains alert, orient, nad noted. Family visiting. Pt positioned for comfort.  Light dimmed     Kendra Michelle RN  08/24/19 [id number]

## 2019-09-04 ENCOUNTER — HOSPITAL ENCOUNTER (OUTPATIENT)
Dept: RESPIRATORY THERAPY | Facility: HOSPITAL | Age: 74
Discharge: HOME | End: 2019-09-04
Payer: MEDICARE

## 2019-09-04 PROCEDURE — 94010 BREATHING CAPACITY TEST: CPT

## 2019-09-10 ENCOUNTER — TELEPHONE (OUTPATIENT)
Dept: CARDIOLOGY | Facility: CLINIC | Age: 74
End: 2019-09-10

## 2019-09-10 NOTE — TELEPHONE ENCOUNTER
Pt calling with complaints of SOB for a few months. She went to see her PCP yesterday and they would like for her to see [facility] sooner than march of 2020 regarding SOB. LVM with Renetta to get pt scheduled. I will reach out to pts PCP office to get records, chest xray from July 2019 and most recent labs.

## 2019-09-12 ENCOUNTER — OFFICE VISIT (OUTPATIENT)
Dept: CARDIOLOGY | Facility: CLINIC | Age: 74
End: 2019-09-12

## 2019-09-12 ENCOUNTER — HOSPITAL ENCOUNTER (OUTPATIENT)
Dept: ULTRASOUND IMAGING | Facility: HOSPITAL | Age: 74
Discharge: HOME | End: 2019-09-12
Payer: MEDICARE

## 2019-09-12 VITALS
DIASTOLIC BLOOD PRESSURE: 64 MMHG | OXYGEN SATURATION: 95 % | HEIGHT: 62 IN | BODY MASS INDEX: 28.67 KG/M2 | WEIGHT: 155.8 LBS | HEART RATE: 56 BPM | SYSTOLIC BLOOD PRESSURE: 136 MMHG

## 2019-09-12 DIAGNOSIS — I10 ESSENTIAL HYPERTENSION: Primary | ICD-10-CM

## 2019-09-12 DIAGNOSIS — R06.09 DYSPNEA ON EXERTION: ICD-10-CM

## 2019-09-12 DIAGNOSIS — R07.0 PAIN IN THROAT: ICD-10-CM

## 2019-09-12 DIAGNOSIS — R00.1 BRADYCARDIA: ICD-10-CM

## 2019-09-12 PROCEDURE — 76536 US EXAM OF HEAD AND NECK: CPT

## 2019-09-12 PROCEDURE — 99214 OFFICE O/P EST MOD 30 MIN: CPT | Performed by: INTERNAL MEDICINE

## 2019-09-12 RX ORDER — IPRATROPIUM BROMIDE 42 UG/1
1 SPRAY, METERED NASAL 2 TIMES DAILY PRN
Refills: 4 | COMMUNITY
Start: 2019-08-13 | End: 2019-10-24

## 2019-09-12 RX ORDER — LISINOPRIL 10 MG/1
1 TABLET ORAL AS NEEDED
Refills: 3 | COMMUNITY
Start: 2019-09-10 | End: 2020-07-09

## 2019-09-12 RX ORDER — AZELASTINE HYDROCHLORIDE 137 UG/1
1 SPRAY, METERED NASAL 2 TIMES DAILY
Refills: 4 | COMMUNITY
Start: 2019-09-10 | End: 2020-12-09

## 2019-09-12 NOTE — PROGRESS NOTES
[facility]  Office visit  Divine Banuelos  1945  [phone]    VISIT DATE:  9/12/2019      PCP: Queenie Amezcua, APRN  [address] [facility] [id number]    CC:  Chief Complaint   Patient presents with   • Hypertension   • Shortness of Breath       PROBLEM LIST:  1. Hypertension:  a.  History of elevated blood pressure due to stress.  b.  Discontinuation of enalapril secondary to hypotension, June 2013.  2. Bradycardia-Holter October 2017: Average heart rate 52 bpm, range of 36-94 bpm. Asymptomatic  3. Hypothyroidism.  4. Depression.  5. Anxiety.  6. Osteoporosis, mild.  7. Mild dyslipidemia.  8. History of Bell’s palsy.  9. Surgical history:  a. Bilateral cataract extraction.    ASSESSMENT:   Diagnosis Plan   1. Essential hypertension     2. Bradycardia         PLAN:  Dyspnea on exertion: Intermittent class III pattern, new onset.  Unremarkable pulmonary evaluation per patient report up to this point.  Transthoracic echo pending to assess underlying myocardial structure and function.  Exercise myocardial perfusion imaging pending to assess chronotropic response to exercise and to evaluate for ischemia.    Blood pressure lability: Currently well controlled.    Sinus bradycardia: Holter monitor consistent with underlying sick sinus physiology, currently asymptomatic. Continue annual clinical follow-up. Currently no indication for pacing.     Subjective  Blood pressures are predominantly running less than 140/90 mmHg.  She is not required any as needed clonidine.  Denies chest pain, palpitations.  Has noticed a significant change in her baseline functional capacity over the previous 2 to 3 months.  Has had progressive shortness of breath with exertion.  Is often winded from walking from one room to the other in her home.  Denies PND orthopnea.  Has been recently evaluated pulmonary function tests which were reportedly unremarkable, official report pending.  Chest x-ray revealed  "thoracic degenerative disease but was otherwise unremarkable.  Recently diagnosed with allergies to dust.    PHYSICAL EXAMINATION:  Vitals:    09/12/19 1533   BP: 136/64   BP Location: Right arm   Patient Position: Sitting   Pulse: 56   SpO2: 95%   Weight: 70.7 kg (155 lb 12.8 oz)   Height: 157.5 cm (62\")     General Appearance:    Alert, cooperative, no distress, appears stated age   Head:    Normocephalic, without obvious abnormality, atraumatic   Eyes:    conjunctiva/corneas clear   Nose:   Nares normal, septum midline, mucosa normal, no drainage   Throat:   Lips, teeth and gums normal   Neck:   Supple, symmetrical, trachea midline, no carotid    bruit or JVD   Lungs:     Clear to auscultation bilaterally, respirations unlabored   Chest Wall:    No tenderness or deformity    Heart:    Regular rate and rhythm, S1 and S2 normal, no murmur, rub   or gallop, normal carotid impulse bilaterally without bruit.   Abdomen:     Soft, non-tender   Extremities:   Extremities normal, atraumatic, no cyanosis or edema   Pulses:   2+ and symmetric all extremities   Skin:   Skin color, texture, turgor normal, no rashes or lesions       Diagnostic Data:  Procedures  Lab Results   Component Value Date    CHLPL 224 (H) 06/05/2019    TRIG 71 06/05/2019    HDL 67 (H) 06/05/2019     No results found for: GLUCOSE, BUN, CREATININE, NA, K, CL, CO2, CREATININE, BUN  No results found for: HGBA1C  Lab Results   Component Value Date    WBC 5.1 06/05/2019    HGB 13.9 06/05/2019    HCT 43.5 06/05/2019     06/05/2019       Allergies  No Known Allergies    Current Medications    Current Outpatient Medications:   •  aspirin 81 MG EC tablet, Take 81 mg by mouth daily., Disp: , Rfl:   •  B Complex Vitamins (VITAMIN B COMPLEX) capsule capsule, Take 1 capsule by mouth Daily., Disp: , Rfl:   •  Calcium Citrate (CITRACAL PO), Take 1 tablet by mouth Daily. + Magnesium , Disp: , Rfl:   •  CloNIDine (CATAPRES) 0.1 MG tablet, Take 1 tablet by mouth 3 " (Three) Times a Day As Needed for high blood pressure (SBP> 150 and/or DBP>90)., Disp: 30 tablet, Rfl: 11  •  levothyroxine (SYNTHROID, LEVOTHROID) 25 MCG tablet, Take 25 mcg by mouth daily., Disp: , Rfl:   •  lisinopril (PRINIVIL,ZESTRIL) 10 MG tablet, Take 1 tablet by mouth As Needed., Disp: , Rfl: 3  •  Misc Natural Products (OSTEO BI-FLEX TRIPLE STRENGTH PO), Take 1 tablet by mouth Daily., Disp: , Rfl:   •  Misc. Devices (GELLHORN PESSARY) misc, Use as directed (Size 3), Disp: 1 each, Rfl: 0  •  Omega-3 Fatty Acids (FISH OIL) 1200 MG capsule capsule, Take 1,200 mg by mouth 2 (two) times a day., Disp: , Rfl:   •  PARoxetine (PAXIL) 20 MG tablet, Take 1 tablet by mouth Every Morning. (Patient taking differently: Take 10 mg by mouth Every Morning.), Disp: 90 tablet, Rfl: 3  •  Azelastine HCl 137 MCG/SPRAY solution, 1 spray into the nostril(s) as directed by provider 2 (Two) Times a Day., Disp: , Rfl: 4  •  ipratropium (ATROVENT) 0.06 % nasal spray, 1 spray into the nostril(s) as directed by provider 2 (Two) Times a Day As Needed., Disp: , Rfl: 4          ROS  Review of Systems   Cardiovascular: Negative for chest pain, dyspnea on exertion, irregular heartbeat and near-syncope.   Respiratory: Positive for cough and shortness of breath.        SOCIAL HX  Social History     Socioeconomic History   • Marital status:      Spouse name: Not on file   • Number of children: Not on file   • Years of education: Not on file   • Highest education level: Not on file   Tobacco Use   • Smoking status: Never Smoker   • Smokeless tobacco: Never Used   Substance and Sexual Activity   • Alcohol use: No   • Drug use: No   • Sexual activity: Defer     Birth control/protection: Post-menopausal   Social History Narrative    Right hand dominant       FAMILY HX  Family History   Problem Relation Age of Onset   • Hypertension Mother    • Colon cancer Father    • Colon cancer Paternal Grandmother    • Colon cancer Paternal Uncle    •  Breast cancer Maternal Aunt              Reinaldo Cope III, MD, FACC

## 2019-09-20 ENCOUNTER — HOSPITAL ENCOUNTER (OUTPATIENT)
Dept: CARDIOLOGY | Facility: HOSPITAL | Age: 74
Discharge: HOME | End: 2019-09-20
Admitting: INTERNAL MEDICINE

## 2019-09-20 ENCOUNTER — HOSPITAL ENCOUNTER (OUTPATIENT)
Dept: CARDIOLOGY | Facility: HOSPITAL | Age: 74
Discharge: HOME | End: 2019-09-20

## 2019-09-20 VITALS — WEIGHT: 155 LBS | HEIGHT: 62 IN | BODY MASS INDEX: 28.52 KG/M2

## 2019-09-20 DIAGNOSIS — R06.09 DYSPNEA ON EXERTION: ICD-10-CM

## 2019-09-20 LAB
BH CV ECHO MEAS - AO MAX PG (FULL): 3 MMHG
BH CV ECHO MEAS - AO MAX PG: 6 MMHG
BH CV ECHO MEAS - AO MEAN PG (FULL): 2 MMHG
BH CV ECHO MEAS - AO MEAN PG: 3 MMHG
BH CV ECHO MEAS - AO ROOT AREA (BSA CORRECTED): 1.7
BH CV ECHO MEAS - AO ROOT AREA: 6.6 CM^2
BH CV ECHO MEAS - AO ROOT DIAM: 2.9 CM
BH CV ECHO MEAS - AO V2 MAX: 127 CM/SEC
BH CV ECHO MEAS - AO V2 MEAN: 84.9 CM/SEC
BH CV ECHO MEAS - AO V2 VTI: 33.4 CM
BH CV ECHO MEAS - AVA(I,A): 1.6 CM^2
BH CV ECHO MEAS - AVA(I,D): 1.6 CM^2
BH CV ECHO MEAS - AVA(V,A): 1.7 CM^2
BH CV ECHO MEAS - AVA(V,D): 1.7 CM^2
BH CV ECHO MEAS - BSA(HAYCOCK): 1.8 M^2
BH CV ECHO MEAS - BSA: 1.7 M^2
BH CV ECHO MEAS - BZI_BMI: 26.7 KILOGRAMS/M^2
BH CV ECHO MEAS - BZI_METRIC_HEIGHT: 160 CM
BH CV ECHO MEAS - BZI_METRIC_WEIGHT: 68.5 KG
BH CV ECHO MEAS - EDV(CUBED): 55.3 ML
BH CV ECHO MEAS - EDV(MOD-SP2): 78 ML
BH CV ECHO MEAS - EDV(MOD-SP4): 75 ML
BH CV ECHO MEAS - EDV(TEICH): 62.3 ML
BH CV ECHO MEAS - EF(CUBED): 70.4 %
BH CV ECHO MEAS - EF(MOD-BP): 59 %
BH CV ECHO MEAS - EF(MOD-SP2): 65.4 %
BH CV ECHO MEAS - EF(MOD-SP4): 56 %
BH CV ECHO MEAS - EF(TEICH): 62.8 %
BH CV ECHO MEAS - ESV(CUBED): 16.4 ML
BH CV ECHO MEAS - ESV(MOD-SP2): 27 ML
BH CV ECHO MEAS - ESV(MOD-SP4): 33 ML
BH CV ECHO MEAS - ESV(TEICH): 23.2 ML
BH CV ECHO MEAS - FS: 33.3 %
BH CV ECHO MEAS - IVS/LVPW: 1.2
BH CV ECHO MEAS - IVSD: 0.66 CM
BH CV ECHO MEAS - LA DIMENSION: 3.4 CM
BH CV ECHO MEAS - LA/AO: 1.2
BH CV ECHO MEAS - LAD MAJOR: 5.3 CM
BH CV ECHO MEAS - LAT PEAK E' VEL: 8.1 CM/SEC
BH CV ECHO MEAS - LATERAL E/E' RATIO: 10.9
BH CV ECHO MEAS - LV DIASTOLIC VOL/BSA (35-75): 43.7 ML/M^2
BH CV ECHO MEAS - LV MASS(C)D: 61 GRAMS
BH CV ECHO MEAS - LV MASS(C)DI: 35.5 GRAMS/M^2
BH CV ECHO MEAS - LV MAX PG: 3 MMHG
BH CV ECHO MEAS - LV MEAN PG: 1 MMHG
BH CV ECHO MEAS - LV SYSTOLIC VOL/BSA (12-30): 19.2 ML/M^2
BH CV ECHO MEAS - LV V1 MAX: 87.2 CM/SEC
BH CV ECHO MEAS - LV V1 MEAN: 55.9 CM/SEC
BH CV ECHO MEAS - LV V1 VTI: 21.1 CM
BH CV ECHO MEAS - LVIDD: 3.8 CM
BH CV ECHO MEAS - LVIDS: 2.5 CM
BH CV ECHO MEAS - LVLD AP2: 6.6 CM
BH CV ECHO MEAS - LVLD AP4: 6.4 CM
BH CV ECHO MEAS - LVLS AP2: 5.4 CM
BH CV ECHO MEAS - LVLS AP4: 4.7 CM
BH CV ECHO MEAS - LVOT AREA (M): 2.5 CM^2
BH CV ECHO MEAS - LVOT AREA: 2.5 CM^2
BH CV ECHO MEAS - LVOT DIAM: 1.8 CM
BH CV ECHO MEAS - LVPWD: 0.57 CM
BH CV ECHO MEAS - MED PEAK E' VEL: 5.4 CM/SEC
BH CV ECHO MEAS - MEDIAL E/E' RATIO: 16.3
BH CV ECHO MEAS - MV A MAX VEL: 63.7 CM/SEC
BH CV ECHO MEAS - MV DEC SLOPE: 189 CM/SEC^2
BH CV ECHO MEAS - MV DEC TIME: 0.32 SEC
BH CV ECHO MEAS - MV E MAX VEL: 87.9 CM/SEC
BH CV ECHO MEAS - MV E/A: 1.4
BH CV ECHO MEAS - MV P1/2T MAX VEL: 88.1 CM/SEC
BH CV ECHO MEAS - MV P1/2T: 136.5 MSEC
BH CV ECHO MEAS - MVA P1/2T LCG: 2.5 CM^2
BH CV ECHO MEAS - MVA(P1/2T): 1.6 CM^2
BH CV ECHO MEAS - PA ACC SLOPE: 846 CM/SEC^2
BH CV ECHO MEAS - PA ACC TIME: 0.1 SEC
BH CV ECHO MEAS - PA PR(ACCEL): 36.3 MMHG
BH CV ECHO MEAS - PI END-D VEL: 144 CM/SEC
BH CV ECHO MEAS - RAP SYSTOLE: 3 MMHG
BH CV ECHO MEAS - RVSP: 21.8 MMHG
BH CV ECHO MEAS - SI(AO): 128.6 ML/M^2
BH CV ECHO MEAS - SI(CUBED): 22.7 ML/M^2
BH CV ECHO MEAS - SI(LVOT): 31.3 ML/M^2
BH CV ECHO MEAS - SI(MOD-SP2): 29.7 ML/M^2
BH CV ECHO MEAS - SI(MOD-SP4): 24.5 ML/M^2
BH CV ECHO MEAS - SI(TEICH): 22.8 ML/M^2
BH CV ECHO MEAS - SV(AO): 220.6 ML
BH CV ECHO MEAS - SV(CUBED): 38.9 ML
BH CV ECHO MEAS - SV(LVOT): 53.7 ML
BH CV ECHO MEAS - SV(MOD-SP2): 51 ML
BH CV ECHO MEAS - SV(MOD-SP4): 42 ML
BH CV ECHO MEAS - SV(TEICH): 39.1 ML
BH CV ECHO MEAS - TAPSE (>1.6): 1.9 CM2
BH CV ECHO MEAS - TR MAX PG: 18.8 MMHG
BH CV ECHO MEAS - TR MAX VEL: 217 CM/SEC
BH CV ECHO MEASUREMENTS AVERAGE E/E' RATIO: 13.02
BH CV XLRA - RV BASE: 3 CM
BH CV XLRA - RV LENGTH: 5.1 CM
BH CV XLRA - RV MID: 2.9 CM
LEFT ATRIUM VOLUME INDEX: 31.5 ML/M^2
LEFT ATRIUM VOLUME: 54 ML

## 2019-09-20 PROCEDURE — 0 TECHNETIUM SESTAMIBI: Performed by: INTERNAL MEDICINE

## 2019-09-20 PROCEDURE — 78452 HT MUSCLE IMAGE SPECT MULT: CPT

## 2019-09-20 PROCEDURE — 93018 CV STRESS TEST I&R ONLY: CPT | Performed by: INTERNAL MEDICINE

## 2019-09-20 PROCEDURE — 78452 HT MUSCLE IMAGE SPECT MULT: CPT | Performed by: INTERNAL MEDICINE

## 2019-09-20 PROCEDURE — 93306 TTE W/DOPPLER COMPLETE: CPT | Performed by: INTERNAL MEDICINE

## 2019-09-20 PROCEDURE — 93306 TTE W/DOPPLER COMPLETE: CPT

## 2019-09-20 PROCEDURE — A9500 TC99M SESTAMIBI: HCPCS | Performed by: INTERNAL MEDICINE

## 2019-09-20 PROCEDURE — 93017 CV STRESS TEST TRACING ONLY: CPT

## 2019-09-20 RX ADMIN — TECHNETIUM TC 99M SESTAMIBI 1 DOSE: 1 INJECTION INTRAVENOUS at 08:15

## 2019-09-20 RX ADMIN — TECHNETIUM TC 99M SESTAMIBI 1 DOSE: 1 INJECTION INTRAVENOUS at 10:20

## 2019-09-23 LAB
BH CV NUCLEAR PRIOR STUDY: 2
BH CV STRESS BP STAGE 1: NORMAL
BH CV STRESS BP STAGE 2: NORMAL
BH CV STRESS DURATION MIN STAGE 1: 3
BH CV STRESS DURATION MIN STAGE 2: 2
BH CV STRESS DURATION SEC STAGE 1: 0
BH CV STRESS DURATION SEC STAGE 2: 15
BH CV STRESS GRADE STAGE 1: 10
BH CV STRESS GRADE STAGE 2: 12
BH CV STRESS HR STAGE 1: 102
BH CV STRESS HR STAGE 2: 131
BH CV STRESS METS STAGE 1: 5
BH CV STRESS METS STAGE 2: 6.5
BH CV STRESS O2 STAGE 1: 96
BH CV STRESS O2 STAGE 2: 96
BH CV STRESS PROTOCOL 1: NORMAL
BH CV STRESS RECOVERY BP: NORMAL MMHG
BH CV STRESS RECOVERY HR: 59 BPM
BH CV STRESS RECOVERY O2: 96 %
BH CV STRESS SPEED STAGE 1: 1.7
BH CV STRESS SPEED STAGE 2: 2.5
BH CV STRESS STAGE 1: 1
BH CV STRESS STAGE 2: 2
LV EF NUC BP: 66 %
MAXIMAL PREDICTED HEART RATE: 146 BPM
PERCENT MAX PREDICTED HR: 91.1 %
STRESS BASELINE BP: NORMAL MMHG
STRESS BASELINE HR: 56 BPM
STRESS O2 SAT REST: 99 %
STRESS PERCENT HR: 107 %
STRESS POST ESTIMATED WORKLOAD: 6.5 METS
STRESS POST EXERCISE DUR MIN: 5 MIN
STRESS POST EXERCISE DUR SEC: 15 SEC
STRESS POST O2 SAT PEAK: 96 %
STRESS POST PEAK BP: NORMAL MMHG
STRESS POST PEAK HR: 133 BPM
STRESS TARGET HR: 124 BPM

## 2019-09-24 ENCOUNTER — TELEPHONE (OUTPATIENT)
Dept: CARDIOLOGY | Facility: CLINIC | Age: 74
End: 2019-09-24

## 2019-09-24 NOTE — TELEPHONE ENCOUNTER
Spoke with pt regarding her stress test results. She is agreeable to discuss further at f/u apt. Reminded of apt date and time.

## 2019-09-24 NOTE — TELEPHONE ENCOUNTER
----- Message from Reinaldo Cope III, MD sent at 9/24/2019  8:50 AM EDT -----  Let her know that the strength of her heart muscle is normal and there is no evidence of a blockage but that her heart muscle has become stiff which may explain some of her SOA.  Will discuss further at f/u

## 2019-10-24 ENCOUNTER — OFFICE VISIT (OUTPATIENT)
Dept: CARDIOLOGY | Facility: CLINIC | Age: 74
End: 2019-10-24

## 2019-10-24 VITALS
OXYGEN SATURATION: 97 % | DIASTOLIC BLOOD PRESSURE: 62 MMHG | HEIGHT: 63 IN | HEART RATE: 62 BPM | BODY MASS INDEX: 27.64 KG/M2 | SYSTOLIC BLOOD PRESSURE: 134 MMHG | WEIGHT: 156 LBS

## 2019-10-24 DIAGNOSIS — R00.1 BRADYCARDIA: Primary | ICD-10-CM

## 2019-10-24 DIAGNOSIS — R06.09 DYSPNEA ON EXERTION: ICD-10-CM

## 2019-10-24 DIAGNOSIS — I10 ESSENTIAL HYPERTENSION: ICD-10-CM

## 2019-10-24 PROCEDURE — 99213 OFFICE O/P EST LOW 20 MIN: CPT | Performed by: INTERNAL MEDICINE

## 2019-10-24 RX ORDER — MELOXICAM 15 MG/1
1 TABLET ORAL AS NEEDED
COMMUNITY
Start: 2017-10-09 | End: 2020-06-22

## 2019-10-24 NOTE — PROGRESS NOTES
[facility]  Office visit  Divine Banuelos  1945  [phone]    VISIT DATE:  10/24/2019      PCP: Queenie Amezcua, APRN  [address] [place] [id number]    CC:  Chief Complaint   Patient presents with   • Hypertension   • Shortness of Breath       PROBLEM LIST:  1. Hypertension:  a.  History of elevated blood pressure due to stress.  b.  Discontinuation of enalapril secondary to hypotension, June 2013.  2. Bradycardia-Holter October 2017: Average heart rate 52 bpm, range of 36-94 bpm. Asymptomatic  3. Hypothyroidism.  4. Depression.  5. Anxiety.  6. Osteoporosis, mild.  7. Mild dyslipidemia.  8. History of Bell’s palsy.  9. Surgical history:  a. Bilateral cataract extraction.    Cardiac studies and procedures:  September 2019  Echo  · Estimated EF appears to be in the range of 56 - 60%.  · Left ventricular diastolic dysfunction (grade II) consistent with pseudonormalization.  · Left atrial cavity size is borderline dilated.  · Mild mitral valve regurgitation is present  Exercise myocardial perfusion imaging  · A stress test was performed following the Fredis protocol.  · The patient reported shortness of breath during the stress test.  · Blood pressure demonstrated a hypertensive response to stress. (215/97).  · There was no ST segment deviation noted during stress.  · Overall, the patient's exercise capacity was normal. JOSE MARIA%: 0/1.  · Left ventricular ejection fraction is normal (Calculated EF = 66%).  · Myocardial perfusion imaging indicates a normal myocardial perfusion study with no evidence of ischemia.  · Impressions are consistent with a low risk study.    ASSESSMENT:   Diagnosis Plan   1. Bradycardia     2. Dyspnea on exertion     3. Essential hypertension         PLAN:  Congestive heart failure, diastolic, chronic: Currently with mild class II symptoms.  No evidence of volume overload.  Intermittent borderline blood pressures limiting any additional afterload reducers or  "diuretics at this time.  Recommended regular aerobic exercise.  We will continue to trend symptoms.    Blood pressure lability: Currently only taking lisinopril on an as-needed basis.    Sinus bradycardia: Holter monitor consistent with underlying sick sinus physiology, currently asymptomatic. Continue annual clinical follow-up. Currently no indication for pacing.     Subjective  Her symptoms of shortness of breath have actually improved.  She was able to walk up the incline from the parking lot today with only mild shortness of breath.  She feels like a lot of her symptoms were due to congestion previously.  She reports that her blood pressures are running less than 120/80 mmHg at home.  She has not had to take as needed antihypertensive medications.  She does not like to take any medications prior to bedtime because she is worried that her blood pressure will drop dangerously low while she is asleep.    PHYSICAL EXAMINATION:  Vitals:    10/24/19 1358   BP: 134/62   BP Location: Left arm   Patient Position: Sitting   Pulse: 62   SpO2: 97%   Weight: 70.8 kg (156 lb)   Height: 160 cm (63\")     General Appearance:    Alert, cooperative, no distress, appears stated age   Head:    Normocephalic, without obvious abnormality, atraumatic   Eyes:    conjunctiva/corneas clear   Nose:   Nares normal, septum midline, mucosa normal, no drainage   Throat:   Lips, teeth and gums normal   Neck:   Supple, symmetrical, trachea midline, no carotid    bruit or JVD   Lungs:     Clear to auscultation bilaterally, respirations unlabored   Chest Wall:    No tenderness or deformity    Heart:    Regular rate and rhythm, S1 and S2 normal, no murmur, rub   or gallop, normal carotid impulse bilaterally without bruit.   Abdomen:     Soft, non-tender   Extremities:   Extremities normal, atraumatic, no cyanosis or edema   Pulses:   2+ and symmetric all extremities   Skin:   Skin color, texture, turgor normal, no rashes or lesions       Diagnostic " Data:  Procedures  Lab Results   Component Value Date    CHLPL 224 (H) 06/05/2019    TRIG 71 06/05/2019    HDL 67 (H) 06/05/2019     No results found for: GLUCOSE, BUN, CREATININE, NA, K, CL, CO2, CREATININE, BUN  No results found for: HGBA1C  Lab Results   Component Value Date    WBC 5.1 06/05/2019    HGB 13.9 06/05/2019    HCT 43.5 06/05/2019     06/05/2019       Allergies  No Known Allergies    Current Medications    Current Outpatient Medications:   •  aspirin 81 MG EC tablet, Take 81 mg by mouth daily., Disp: , Rfl:   •  Azelastine HCl 137 MCG/SPRAY solution, 1 spray into the nostril(s) as directed by provider 2 (Two) Times a Day., Disp: , Rfl: 4  •  B Complex Vitamins (VITAMIN B COMPLEX) capsule capsule, Take 1 capsule by mouth Daily., Disp: , Rfl:   •  Calcium Citrate (CITRACAL PO), Take 1 tablet by mouth Daily. + Magnesium , Disp: , Rfl:   •  levothyroxine (SYNTHROID, LEVOTHROID) 25 MCG tablet, Take 25 mcg by mouth daily., Disp: , Rfl:   •  lisinopril (PRINIVIL,ZESTRIL) 10 MG tablet, Take 1 tablet by mouth As Needed., Disp: , Rfl: 3  •  meloxicam (MOBIC) 15 MG tablet, Take 1 tablet by mouth As Needed., Disp: , Rfl:   •  Misc Natural Products (OSTEO BI-FLEX TRIPLE STRENGTH PO), Take 1 tablet by mouth 2 (Two) Times a Day., Disp: , Rfl:   •  Misc. Devices (GELLHORN PESSARY) misc, Use as directed (Size 3), Disp: 1 each, Rfl: 0  •  Omega-3 Fatty Acids (FISH OIL) 1200 MG capsule capsule, Take 1,200 mg by mouth 2 (two) times a day., Disp: , Rfl:   •  PARoxetine (PAXIL) 20 MG tablet, Take 1 tablet by mouth Every Morning. (Patient taking differently: Take 10 mg by mouth Every Morning.), Disp: 90 tablet, Rfl: 3          ROS  Review of Systems   Cardiovascular: Positive for dyspnea on exertion. Negative for chest pain, irregular heartbeat and near-syncope.   Respiratory: Positive for shortness of breath. Negative for cough.        SOCIAL HX  Social History     Socioeconomic History   • Marital status:       Spouse name: Not on file   • Number of children: Not on file   • Years of education: Not on file   • Highest education level: Not on file   Tobacco Use   • Smoking status: Never Smoker   • Smokeless tobacco: Never Used   Substance and Sexual Activity   • Alcohol use: No   • Drug use: No   • Sexual activity: Defer     Birth control/protection: Post-menopausal   Social History Narrative    Right hand dominant       FAMILY HX  Family History   Problem Relation Age of Onset   • Hypertension Mother    • Colon cancer Father    • Colon cancer Paternal Grandmother    • Colon cancer Paternal Uncle    • Breast cancer Maternal Aunt              Reinaldo Cope III, MD, FACC

## 2019-11-08 ENCOUNTER — TRANSCRIBE ORDERS (OUTPATIENT)
Dept: MAMMOGRAPHY | Facility: HOSPITAL | Age: 74
End: 2019-11-08

## 2019-11-08 DIAGNOSIS — Z12.39 BREAST CANCER SCREENING: Primary | ICD-10-CM

## 2020-01-10 ENCOUNTER — HOSPITAL ENCOUNTER (OUTPATIENT)
Dept: MAMMOGRAPHY | Facility: HOSPITAL | Age: 75
Discharge: HOME | End: 2020-01-10
Admitting: NURSE PRACTITIONER

## 2020-01-10 DIAGNOSIS — Z12.39 BREAST CANCER SCREENING: ICD-10-CM

## 2020-01-10 PROCEDURE — 77063 BREAST TOMOSYNTHESIS BI: CPT

## 2020-01-10 PROCEDURE — 77067 SCR MAMMO BI INCL CAD: CPT

## 2020-06-15 ENCOUNTER — OFFICE VISIT (OUTPATIENT)
Dept: OBSTETRICS AND GYNECOLOGY | Facility: CLINIC | Age: 75
End: 2020-06-15

## 2020-06-15 VITALS
WEIGHT: 159.8 LBS | SYSTOLIC BLOOD PRESSURE: 142 MMHG | BODY MASS INDEX: 29.4 KG/M2 | DIASTOLIC BLOOD PRESSURE: 72 MMHG | HEIGHT: 62 IN

## 2020-06-15 DIAGNOSIS — N64.52 BLOODY DISCHARGE FROM RIGHT NIPPLE: ICD-10-CM

## 2020-06-15 DIAGNOSIS — Z46.89 HISTORY OF REMOVAL OF PESSARY: Primary | ICD-10-CM

## 2020-06-15 PROCEDURE — 99214 OFFICE O/P EST MOD 30 MIN: CPT | Performed by: PHYSICIAN ASSISTANT

## 2020-06-15 NOTE — PROGRESS NOTES
Subjective   Chief Complaint   Patient presents with   • Follow-up     Pessary reinsertion   • Breast Problem     Had some bleeding from right breast but now has resolved       Divine Banuelos is a 74 y.o. year old  presenting to be seen for reinsertion of pessary and complaint of bloody discharge from her right nipple.  Patient reports she took her pessary out several months ago and has been doing well without her pessary until recently she has been helping her daughter do some landscaping.  She has been doing a lot of lifting and tugging and feels some pressure and would like her pessary to be reinserted.  The patient has no issue taking out her pessary but she cannot reinsert her pessary.  She has been voiding well and has had no urinary tract symptoms.  No problems with bowel movements.  She reports that about 2 weeks ago she noted some bloody drainage from her right nipple.  This seems to have stopped 2 to 3 days ago.  She has not had any pain in the right breast or felt any lumps or masses.  She had a normal screening mammogram 2020.    Past Medical History:   Diagnosis Date   • Anxiety    • Arthritis    • Bell's palsy    • Depression    • Hyperlipidemia    • Hypertension    • Hypothyroidism    • Osteoporosis         Current Outpatient Medications:   •  aspirin 81 MG EC tablet, Take 81 mg by mouth daily., Disp: , Rfl:   •  Azelastine HCl 137 MCG/SPRAY solution, 1 spray into the nostril(s) as directed by provider 2 (Two) Times a Day., Disp: , Rfl: 4  •  B Complex Vitamins (VITAMIN B COMPLEX) capsule capsule, Take 1 capsule by mouth Daily., Disp: , Rfl:   •  Calcium Citrate (CITRACAL PO), Take 1 tablet by mouth Daily. + Magnesium , Disp: , Rfl:   •  levothyroxine (SYNTHROID, LEVOTHROID) 25 MCG tablet, Take 25 mcg by mouth daily., Disp: , Rfl:   •  lisinopril (PRINIVIL,ZESTRIL) 10 MG tablet, Take 1 tablet by mouth As Needed., Disp: , Rfl: 3  •  meloxicam (MOBIC) 15 MG tablet, Take 1 tablet by mouth  "As Needed., Disp: , Rfl:   •  Misc Natural Products (OSTEO BI-FLEX TRIPLE STRENGTH PO), Take 1 tablet by mouth 2 (Two) Times a Day., Disp: , Rfl:   •  Misc. Devices (GELLHORN PESSARY) misc, Use as directed (Size 3), Disp: 1 each, Rfl: 0  •  Omega-3 Fatty Acids (FISH OIL) 1200 MG capsule capsule, Take 1,200 mg by mouth 2 (two) times a day., Disp: , Rfl:   •  PARoxetine (PAXIL) 20 MG tablet, Take 1 tablet by mouth Every Morning. (Patient taking differently: Take 10 mg by mouth Every Morning.), Disp: 90 tablet, Rfl: 3   No Known Allergies   Past Surgical History:   Procedure Laterality Date   • CATARACT EXTRACTION Bilateral    • CATARACT EXTRACTION, BILATERAL     • RETINAL DETACHMENT REPAIR        Social History     Socioeconomic History   • Marital status:      Spouse name: Not on file   • Number of children: Not on file   • Years of education: Not on file   • Highest education level: Not on file   Tobacco Use   • Smoking status: Never Smoker   • Smokeless tobacco: Never Used   Substance and Sexual Activity   • Alcohol use: No   • Drug use: No   • Sexual activity: Defer     Birth control/protection: Post-menopausal   Social History Narrative    Right hand dominant      Family History   Problem Relation Age of Onset   • Hypertension Mother    • Colon cancer Father    • Colon cancer Paternal Grandmother    • Colon cancer Paternal Uncle    • Breast cancer Maternal Aunt        Review of Systems   Constitutional: Negative for activity change, chills, fatigue and fever.   Gastrointestinal: Negative for abdominal pain, diarrhea, nausea and vomiting.   Genitourinary: Positive for enuresis. Negative for difficulty urinating, dysuria, hematuria, pelvic pain, vaginal bleeding, vaginal discharge and vaginal pain.           Objective   /72   Ht 157.5 cm (62\")   Wt 72.5 kg (159 lb 12.8 oz)   LMP  (LMP Unknown)   Breastfeeding No   BMI 29.23 kg/m²     Physical Exam   Constitutional: She appears well-developed and " well-nourished. She is cooperative. No distress.   Eyes: Conjunctivae, EOM and lids are normal.   Pulmonary/Chest: Right breast exhibits no inverted nipple, no mass, no nipple discharge, no skin change and no tenderness. Left breast exhibits no inverted nipple, no mass, no nipple discharge, no skin change and no tenderness.   Dried blood noted in nipple but no blood or drainage expressed   Abdominal: Soft. Normal appearance. She exhibits no mass. There is no tenderness.   Genitourinary: There is no tenderness or lesion on the right labia. There is no tenderness or lesion on the left labia.   Genitourinary Comments: Grade 2 cystocele  Ring pessary with support inserted without difficulty   Musculoskeletal: Normal range of motion.   Neurological: She is alert.   Skin: Skin is warm and dry. No lesion and no rash noted.   Psychiatric: She has a normal mood and affect. Her behavior is normal. Thought content normal.            Assessment and Plan  Divine was seen today for follow-up and breast problem.    Diagnoses and all orders for this visit:    History of removal of pessary    Bloody discharge from right nipple  -     Mammo Diagnostic Right With CAD; Future      Patient Instructions   Will proceed with diagnostic right mammogram  Follow up 2 months for pessary maintenance              This note was electronically signed.    Denise Hernandez PA-C   Estela 15, 2020

## 2020-06-18 ENCOUNTER — HOSPITAL ENCOUNTER (OUTPATIENT)
Dept: MAMMOGRAPHY | Facility: HOSPITAL | Age: 75
Discharge: HOME | End: 2020-06-18
Admitting: PHYSICIAN ASSISTANT

## 2020-06-18 ENCOUNTER — HOSPITAL ENCOUNTER (OUTPATIENT)
Dept: ULTRASOUND IMAGING | Facility: HOSPITAL | Age: 75
Discharge: HOME | End: 2020-06-18

## 2020-06-18 DIAGNOSIS — N64.52 BLOODY DISCHARGE FROM RIGHT NIPPLE: ICD-10-CM

## 2020-06-18 PROCEDURE — 77065 DX MAMMO INCL CAD UNI: CPT

## 2020-06-18 PROCEDURE — 76642 ULTRASOUND BREAST LIMITED: CPT

## 2020-06-18 PROCEDURE — G0279 TOMOSYNTHESIS, MAMMO: HCPCS

## 2020-06-22 ENCOUNTER — OFFICE VISIT (OUTPATIENT)
Dept: ORTHOPEDIC SURGERY | Facility: CLINIC | Age: 75
End: 2020-06-22

## 2020-06-22 VITALS — RESPIRATION RATE: 18 BRPM | HEIGHT: 62 IN | WEIGHT: 159 LBS | BODY MASS INDEX: 29.26 KG/M2

## 2020-06-22 DIAGNOSIS — M77.11 LATERAL EPICONDYLITIS OF RIGHT ELBOW: ICD-10-CM

## 2020-06-22 DIAGNOSIS — M75.52 BURSITIS OF LEFT SHOULDER: Primary | ICD-10-CM

## 2020-06-22 PROCEDURE — 99212 OFFICE O/P EST SF 10 MIN: CPT | Performed by: PHYSICIAN ASSISTANT

## 2020-06-22 PROCEDURE — 20610 DRAIN/INJ JOINT/BURSA W/O US: CPT | Performed by: PHYSICIAN ASSISTANT

## 2020-06-22 RX ORDER — PAROXETINE 10 MG/1
10 TABLET, FILM COATED ORAL EVERY MORNING
COMMUNITY
Start: 2020-06-05

## 2020-06-22 RX ORDER — LIDOCAINE HYDROCHLORIDE 10 MG/ML
2 INJECTION, SOLUTION INFILTRATION; PERINEURAL
Status: COMPLETED | OUTPATIENT
Start: 2020-06-22 | End: 2020-06-22

## 2020-06-22 RX ORDER — METHYLPREDNISOLONE ACETATE 40 MG/ML
40 INJECTION, SUSPENSION INTRA-ARTICULAR; INTRALESIONAL; INTRAMUSCULAR; SOFT TISSUE
Status: COMPLETED | OUTPATIENT
Start: 2020-06-22 | End: 2020-06-22

## 2020-06-22 RX ADMIN — METHYLPREDNISOLONE ACETATE 40 MG: 40 INJECTION, SUSPENSION INTRA-ARTICULAR; INTRALESIONAL; INTRAMUSCULAR; SOFT TISSUE at 14:56

## 2020-06-22 RX ADMIN — LIDOCAINE HYDROCHLORIDE 2 ML: 10 INJECTION, SOLUTION INFILTRATION; PERINEURAL at 14:56

## 2020-06-22 NOTE — PROGRESS NOTES
"Subjective   Divine Banuelos is a 74 y.o. female here today for injection therapy.    Chief Complaint   Patient presents with   • Left Shoulder - Pain, Injections   • Right Elbow - Pain     She states she recently threw several rocks while turning up a field, no specific injury     Patient presents for repeat left shoulder cortisone injection.  She states the last cortisone injection left shoulder helped tremendously.    She would also like to have her right elbow evaluated.  There is no specific injury or trauma.  She states the pain in her right lateral elbow began 2 months ago after throwing heavy rocks to clear out a field.  She denies swelling.  Denies redness or warmth.  Denies numbness or tingling.  Denies hand or digit pain      Past Medical History:   Diagnosis Date   • Anxiety    • Arthritis    • Bell's palsy    • Depression    • Hyperlipidemia    • Hypertension    • Hypothyroidism    • Osteoporosis          Past Surgical History:   Procedure Laterality Date   • CATARACT EXTRACTION Bilateral    • CATARACT EXTRACTION, BILATERAL     • RETINAL DETACHMENT REPAIR         No Known Allergies    Objective   Resp 18   Ht 157.5 cm (62\")   Wt 72.1 kg (159 lb)   LMP  (LMP Unknown)   BMI 29.08 kg/m²    Physical Exam   Constitutional: She is oriented to person, place, and time. She appears well-developed and well-nourished.   Pulmonary/Chest: Effort normal. No respiratory distress.   Musculoskeletal:        Right elbow: She exhibits normal range of motion, no swelling, no effusion, no deformity and no laceration. Tenderness found. Lateral epicondyle tenderness noted. No medial epicondyle and no olecranon process tenderness noted.        Right hand: She exhibits normal capillary refill. Normal sensation noted. Normal strength noted.   Neurological: She is alert and oriented to person, place, and time.   Psychiatric: She has a normal mood and affect. Her behavior is normal.   Nursing note and vitals " reviewed.      Normal right elbow ROM.       Skin exam stable with no erythema, ecchymosis or rash.  No new swelling.  No motor or sensory changes.  Distal pulse intact.    Large Joint Arthrocentesis: L subacromial bursa  Date/Time: 6/22/2020 2:56 PM  Consent given by: patient  Site marked: site marked  Timeout: Immediately prior to procedure a time out was called to verify the correct patient, procedure, equipment, support staff and site/side marked as required   Supporting Documentation  Indications: pain   Procedure Details  Location: shoulder - L subacromial bursa  Preparation: Patient was prepped and draped in the usual sterile fashion  Needle size: 22 G  Approach: posterior  Medications administered: 2 mL lidocaine 1 %; 40 mg methylPREDNISolone acetate 40 MG/ML  Patient tolerance: patient tolerated the procedure well with no immediate complications          Assessment/Plan      Diagnosis Plan   1. Bursitis of left shoulder  Large Joint Arthrocentesis: L subacromial bursa   2. Lateral epicondylitis of right elbow         Guided on proper techniques for mobility, strength, agility and/or conditioning exercises  Ice, heat, and/or modalities as beneficial  Watch for signs and symptoms of infection  Call or notify for any adverse effect from injection therapy    Recommendations:    I instructed the patient to use topical Aspercreme to the right elbow as well as ice 3 times daily for 20 minutes at a time.  Follow-up if no improvement to the right elbow.    Follow-up in 3 months for the left shoulder  Patient agreeable to call or return sooner for any concerns.

## 2020-07-09 ENCOUNTER — OFFICE VISIT (OUTPATIENT)
Dept: CARDIOLOGY | Facility: CLINIC | Age: 75
End: 2020-07-09

## 2020-07-09 VITALS
DIASTOLIC BLOOD PRESSURE: 64 MMHG | HEIGHT: 63 IN | SYSTOLIC BLOOD PRESSURE: 112 MMHG | WEIGHT: 159.2 LBS | HEART RATE: 64 BPM | OXYGEN SATURATION: 97 % | BODY MASS INDEX: 28.21 KG/M2

## 2020-07-09 DIAGNOSIS — R00.1 BRADYCARDIA: ICD-10-CM

## 2020-07-09 DIAGNOSIS — I51.89 DIASTOLIC DYSFUNCTION: Primary | ICD-10-CM

## 2020-07-09 PROCEDURE — 99213 OFFICE O/P EST LOW 20 MIN: CPT | Performed by: INTERNAL MEDICINE

## 2020-07-09 NOTE — PROGRESS NOTES
[facility]  Office visit  Divine Banuelos  1945  [phone]    VISIT DATE:  7/9/2020      PCP: Queenie Amezcua, APRN  [address] [place] [id number]    CC:  Chief Complaint   Patient presents with   • Slow Heart Rate       PROBLEM LIST:  1. Hypertension:  a.  History of elevated blood pressure due to stress.  b.  Discontinuation of enalapril secondary to hypotension, June 2013.  2. Bradycardia-Holter October 2017: Average heart rate 52 bpm, range of 36-94 bpm. Asymptomatic  3. Hypothyroidism.  4. Depression.  5. Anxiety.  6. Osteoporosis, mild.  7. Mild dyslipidemia.  8. History of Bell’s palsy.  9. Surgical history:  a. Bilateral cataract extraction.    Cardiac studies and procedures:  September 2019  Echo  · Estimated EF appears to be in the range of 56 - 60%.  · Left ventricular diastolic dysfunction (grade II) consistent with pseudonormalization.  · Left atrial cavity size is borderline dilated.  · Mild mitral valve regurgitation is present  Exercise myocardial perfusion imaging  · A stress test was performed following the Fredis protocol.  · The patient reported shortness of breath during the stress test.  · Blood pressure demonstrated a hypertensive response to stress. (215/97).  · There was no ST segment deviation noted during stress.  · Overall, the patient's exercise capacity was normal. JOSE MARIA%: 0/1.  · Left ventricular ejection fraction is normal (Calculated EF = 66%).  · Myocardial perfusion imaging indicates a normal myocardial perfusion study with no evidence of ischemia.  · Impressions are consistent with a low risk study.    ASSESSMENT:   Diagnosis Plan   1. Diastolic dysfunction     2. Bradycardia         PLAN:  Congestive heart failure, diastolic, chronic: Currently with mild class II symptoms.  No evidence of volume overload.  Continue regular aerobic exercise.  Afterload well controlled.  We will continue to trend symptoms.    Blood pressure lability:  "Currently only taking lisinopril on an as-needed basis.    Sinus bradycardia: Holter monitor consistent with underlying sick sinus physiology, currently asymptomatic. Continue annual clinical follow-up. Currently no indication for pacing.     Hyperlipidemia: Mild elevation in LDL, excellent HDL and triglyceride levels.  Continue dietary modifications and regular exercise.  Not recommending further pharmacologic therapy at this time.    Subjective  Continues to maintain an active lifestyle.  Was able to help her daughter clear brush and rock from a hillside without limiting dyspnea on exertion.  She reports that her blood pressures are running less than 120/80 mmHg at home.  She has not had to take as needed antihypertensive medications.      PHYSICAL EXAMINATION:  Vitals:    07/09/20 1414   BP: 112/64   BP Location: Right arm   Patient Position: Sitting   Pulse: 64   SpO2: 97%   Weight: 72.2 kg (159 lb 3.2 oz)   Height: 160 cm (63\")     General Appearance:    Alert, cooperative, no distress, appears stated age   Head:    Normocephalic, without obvious abnormality, atraumatic   Eyes:    conjunctiva/corneas clear   Nose:   Nares normal, septum midline, mucosa normal, no drainage   Throat:   Lips, teeth and gums normal   Neck:   Supple, symmetrical, trachea midline, no carotid    bruit or JVD   Lungs:     Clear to auscultation bilaterally, respirations unlabored   Chest Wall:    No tenderness or deformity    Heart:    Regular rate and rhythm, S1 and S2 normal, no murmur, rub   or gallop, normal carotid impulse bilaterally without bruit.   Abdomen:     Soft, non-tender   Extremities:   Extremities normal, atraumatic, no cyanosis or edema   Pulses:   2+ and symmetric all extremities   Skin:   Skin color, texture, turgor normal, no rashes or lesions       Diagnostic Data:  Procedures  Lab Results   Component Value Date    CHLPL 224 (H) 06/05/2019    TRIG 71 06/05/2019    HDL 67 (H) 06/05/2019     No results found for: " GLUCOSE, BUN, CREATININE, NA, K, CL, CO2, CREATININE, BUN  No results found for: HGBA1C  Lab Results   Component Value Date    WBC 5.1 06/05/2019    HGB 13.9 06/05/2019    HCT 43.5 06/05/2019     06/05/2019       Allergies  No Known Allergies    Current Medications    Current Outpatient Medications:   •  aspirin 81 MG EC tablet, Take 81 mg by mouth daily., Disp: , Rfl:   •  Azelastine HCl 137 MCG/SPRAY solution, 1 spray into the nostril(s) as directed by provider 2 (Two) Times a Day., Disp: , Rfl: 4  •  Calcium Citrate (CITRACAL PO), Take 1 tablet by mouth Daily. + Magnesium , Disp: , Rfl:   •  levothyroxine (SYNTHROID, LEVOTHROID) 25 MCG tablet, Take 25 mcg by mouth daily., Disp: , Rfl:   •  Misc Natural Products (OSTEO BI-FLEX TRIPLE STRENGTH PO), Take 1 tablet by mouth 2 (Two) Times a Day., Disp: , Rfl:   •  Misc. Devices (GELLHORN PESSARY) misc, Use as directed (Size 3), Disp: 1 each, Rfl: 0  •  Omega-3 Fatty Acids (FISH OIL) 1200 MG capsule capsule, Take 1,200 mg by mouth 2 (two) times a day., Disp: , Rfl:   •  PARoxetine (PAXIL) 10 MG tablet, Take 10 mg by mouth Every Morning., Disp: , Rfl:           ROS  Review of Systems   Cardiovascular: Positive for dyspnea on exertion. Negative for chest pain, irregular heartbeat and near-syncope.   Respiratory: Positive for shortness of breath. Negative for cough.        SOCIAL HX  Social History     Socioeconomic History   • Marital status:      Spouse name: Not on file   • Number of children: Not on file   • Years of education: Not on file   • Highest education level: Not on file   Tobacco Use   • Smoking status: Never Smoker   • Smokeless tobacco: Never Used   Substance and Sexual Activity   • Alcohol use: No   • Drug use: No   • Sexual activity: Defer     Birth control/protection: Post-menopausal   Social History Narrative    Right hand dominant       FAMILY HX  Family History   Problem Relation Age of Onset   • Hypertension Mother    • Colon cancer  Father    • Colon cancer Paternal Grandmother    • Colon cancer Paternal Uncle    • Breast cancer Maternal Aunt              Reinaldo Cope III, MD, FACC

## 2020-08-18 ENCOUNTER — OFFICE VISIT (OUTPATIENT)
Dept: OBSTETRICS AND GYNECOLOGY | Facility: CLINIC | Age: 75
End: 2020-08-18

## 2020-08-18 VITALS
HEIGHT: 63 IN | DIASTOLIC BLOOD PRESSURE: 66 MMHG | SYSTOLIC BLOOD PRESSURE: 128 MMHG | WEIGHT: 159.2 LBS | BODY MASS INDEX: 28.21 KG/M2

## 2020-08-18 DIAGNOSIS — N60.01 BREAST CYST, RIGHT: ICD-10-CM

## 2020-08-18 DIAGNOSIS — N81.11 CYSTOCELE, MIDLINE: ICD-10-CM

## 2020-08-18 DIAGNOSIS — Z46.89 PESSARY MAINTENANCE: Primary | ICD-10-CM

## 2020-08-18 DIAGNOSIS — N64.4 BREAST PAIN, RIGHT: ICD-10-CM

## 2020-08-18 PROCEDURE — 99213 OFFICE O/P EST LOW 20 MIN: CPT | Performed by: PHYSICIAN ASSISTANT

## 2020-08-18 NOTE — PATIENT INSTRUCTIONS
Patient is to return to office 2 months for pessary maintenance.  Referral placed to Dr. Caceres for further evaluation of right breast pain.

## 2020-08-18 NOTE — PROGRESS NOTES
Subjective   Chief Complaint   Patient presents with   • Pessary Check     doing well.  Patient would like to discuss breast cyst with a possible referral to surgeon       Divine QIANA Banuelos is a 74 y.o. year old  presenting to be seen for routine pessary maintenance.  She reports she has no problems with her pessary.  He is voiding well.  No problems with bowel movements.  Has had no vaginal bleeding.  She is requesting referral to a general surgeon for evaluation of breast cyst.  She was seen in office in  with complaint of a bloody discharge from the right nipple.  She subsequently had diagnostic mammogram done and right breast ultrasound which revealed 3 small cysts in the right breast and follow-up imaging was recommended in 6 months.  Patient had had a normal screening mammogram in 2019.  The patient had initially elected to do the six-month follow-up however she reports that she has had some discomfort in the right breast and would like to be referred for further evaluation by general surgeon.  She has not had any further bleeding from the nipple.    Past Medical History:   Diagnosis Date   • Anxiety    • Arthritis    • Bell's palsy    • Depression    • Hyperlipidemia    • Hypertension    • Hypothyroidism    • Osteoporosis    • Pessary maintenance         Current Outpatient Medications:   •  aspirin 81 MG EC tablet, Take 81 mg by mouth daily., Disp: , Rfl:   •  Calcium Citrate (CITRACAL PO), Take 1 tablet by mouth Daily. + Magnesium , Disp: , Rfl:   •  levothyroxine (SYNTHROID, LEVOTHROID) 25 MCG tablet, Take 25 mcg by mouth daily., Disp: , Rfl:   •  Misc Natural Products (OSTEO BI-FLEX TRIPLE STRENGTH PO), Take 1 tablet by mouth 2 (Two) Times a Day., Disp: , Rfl:   •  Misc. Devices (GELLHORN PESSARY) misc, Use as directed (Size 3), Disp: 1 each, Rfl: 0  •  Omega-3 Fatty Acids (FISH OIL) 1200 MG capsule capsule, Take 1,200 mg by mouth 2 (two) times a day., Disp: , Rfl:   •  PARoxetine (PAXIL)  "10 MG tablet, Take 10 mg by mouth Every Morning., Disp: , Rfl:   •  Azelastine HCl 137 MCG/SPRAY solution, 1 spray into the nostril(s) as directed by provider 2 (Two) Times a Day., Disp: , Rfl: 4   No Known Allergies   Past Surgical History:   Procedure Laterality Date   • CATARACT EXTRACTION Bilateral    • CATARACT EXTRACTION, BILATERAL     • RETINAL DETACHMENT REPAIR        Social History     Socioeconomic History   • Marital status:      Spouse name: Not on file   • Number of children: Not on file   • Years of education: Not on file   • Highest education level: Not on file   Tobacco Use   • Smoking status: Never Smoker   • Smokeless tobacco: Never Used   Substance and Sexual Activity   • Alcohol use: No   • Drug use: No   • Sexual activity: Defer     Birth control/protection: Post-menopausal   Social History Narrative    Right hand dominant      Family History   Problem Relation Age of Onset   • Hypertension Mother    • Colon cancer Father    • Colon cancer Paternal Grandmother    • Colon cancer Paternal Uncle    • Breast cancer Maternal Aunt        Review of Systems   Constitutional: Negative for chills, diaphoresis and fever.   Gastrointestinal: Negative for abdominal pain, nausea and vomiting.   Genitourinary: Negative for difficulty urinating, dysuria, menstrual problem, pelvic pain, vaginal bleeding and vaginal discharge.           Objective   /66   Ht 160 cm (63\")   Wt 72.2 kg (159 lb 3.2 oz)   LMP  (LMP Unknown)   Breastfeeding No   BMI 28.20 kg/m²     Physical Exam   Constitutional: She appears well-developed and well-nourished. She is cooperative. No distress.   Eyes: Conjunctivae, EOM and lids are normal.   Pulmonary/Chest: Right breast exhibits no inverted nipple, no mass, no nipple discharge, no skin change and no tenderness.   Abdominal: Soft. Normal appearance. There is no tenderness.   Genitourinary:   Genitourinary Comments: Ring pessary with support is removed cleaned and " reinserted.  There is no vaginal wall erosion or abrasions or granulation tissue.  Grade 2 cystocele   Musculoskeletal: Normal range of motion.   Neurological: She is alert.   Skin: Skin is warm and dry. No lesion and no rash noted.   Psychiatric: She has a normal mood and affect. Her behavior is normal. Thought content normal.            Assessment and Plan  Divine was seen today for pessary check.    Diagnoses and all orders for this visit:    Pessary maintenance    Cystocele, midline    Breast pain, right  -     Ambulatory Referral to General Surgery    Breast cyst, right  -     Ambulatory Referral to General Surgery      Patient Instructions   Patient is to return to office 2 months for pessary maintenance.  Referral placed to Dr. Caceres for further evaluation of right breast pain.             This note was electronically signed.    Denise Hernandez PA-C   August 18, 2020

## 2020-10-06 NOTE — PROGRESS NOTES
Patient: Divine Banuelos    YOB: 1945    Date: 10/09/2020    Primary Care Provider: Queenie Amezcua APRN    Chief Complaint   Patient presents with   • Establish Care   • Cyst     right breast x3       Subjective .     History of present illness:  I saw the patient in the office  today for evaluation and treatment of a cyst in the right breast.  Her mammogram showed multiple cysts and did complains of bloody nipple discharge at that time.  Today stated there are 3 cysts in right breast with pain for 6 months. The patient does complain of breast pain.  Ultrasound indicates a suspicious solid mass right breast.    The following portions of the patient's history were reviewed and updated as appropriate: allergies, current medications, past family history, past medical history, past social history, past surgical history and problem list.    Review of Systems   Constitutional: Negative for chills, fever and unexpected weight change.   HENT: Negative for hearing loss, trouble swallowing and voice change.    Eyes: Negative for visual disturbance.   Respiratory: Negative for apnea, cough, chest tightness, shortness of breath and wheezing.    Cardiovascular: Negative for chest pain, palpitations and leg swelling.   Gastrointestinal: Negative for abdominal distention, abdominal pain, anal bleeding, blood in stool, constipation, diarrhea, nausea, rectal pain and vomiting.   Endocrine: Negative for cold intolerance and heat intolerance.   Genitourinary: Negative for difficulty urinating, dysuria and flank pain.   Musculoskeletal: Negative for back pain and gait problem.   Skin: Negative for color change, rash and wound.   Neurological: Negative for dizziness, syncope, speech difficulty, weakness, light-headedness, numbness and headaches.   Hematological: Negative for adenopathy. Does not bruise/bleed easily.   Psychiatric/Behavioral: Negative for confusion. The patient is not nervous/anxious.   "      History:  Past Medical History:   Diagnosis Date   • Anxiety    • Arthritis    • Bell's palsy    • Depression    • Hyperlipidemia    • Hypertension    • Hypothyroidism    • Osteoporosis    • Pessary maintenance           Past Surgical History:   Procedure Laterality Date   • CATARACT EXTRACTION Bilateral    • CATARACT EXTRACTION, BILATERAL     • RETINAL DETACHMENT REPAIR         Family History   Problem Relation Age of Onset   • Hypertension Mother    • Colon cancer Father    • Colon cancer Paternal Grandmother    • Colon cancer Paternal Uncle    • Breast cancer Maternal Aunt        Social History     Tobacco Use   • Smoking status: Never Smoker   • Smokeless tobacco: Never Used   Substance Use Topics   • Alcohol use: No   • Drug use: No       Allergies:  No Known Allergies    Medications:     Current Outpatient Medications:   •  aspirin 81 MG EC tablet, Take 81 mg by mouth daily., Disp: , Rfl:   •  Azelastine HCl 137 MCG/SPRAY solution, 1 spray into the nostril(s) as directed by provider 2 (Two) Times a Day., Disp: , Rfl: 4  •  Calcium Citrate (CITRACAL PO), Take 1 tablet by mouth Daily. + Magnesium , Disp: , Rfl:   •  levothyroxine (SYNTHROID, LEVOTHROID) 25 MCG tablet, Take 25 mcg by mouth daily., Disp: , Rfl:   •  Misc Natural Products (OSTEO BI-FLEX TRIPLE STRENGTH PO), Take 1 tablet by mouth 2 (Two) Times a Day., Disp: , Rfl:   •  Misc. Devices (GELLHORN PESSARY) misc, Use as directed (Size 3), Disp: 1 each, Rfl: 0  •  Omega-3 Fatty Acids (FISH OIL) 1200 MG capsule capsule, Take 1,200 mg by mouth 2 (two) times a day., Disp: , Rfl:   •  PARoxetine (PAXIL) 10 MG tablet, Take 10 mg by mouth Every Morning., Disp: , Rfl:     Objective     Vital Signs:   Vitals:    10/09/20 1356   BP: 120/80   Pulse: 80   SpO2: 96%   Weight: 70.8 kg (156 lb)   Height: 160 cm (63\")       Physical Exam:     General Appearance:    Alert, cooperative, in no acute distress   Head:    Normocephalic, without obvious abnormality, " atraumatic   Eyes:            Lids and lashes normal, conjunctivae and sclerae normal, no   icterus, no pallor, corneas clear,   Ears:    Ears appear intact with no abnormalities noted   Throat:   No oral lesions, no thrush, oral mucosa moist   Breast:    No palpable mass in either breast or axilla   Lungs:     Clear to auscultation,respirations regular, even and                  Unlabored    Heart:    Regular rhythm and normal rate, no murmur, no gallop.   Chest Wall:    No abnormalities observed   Abdomen:     Normal bowel sounds, no masses, no organomegaly, soft        non-tender, non-distended, no guarding.   Extremities:   Moves all extremities well, no edema, no cyanosis, no             redness   Pulses:   Pulses palpable and equal bilaterally   Skin:   No bleeding, bruising or rash   Lymph nodes:   No palpable adenopathy   Neurologic:   Cranial nerves 2 - 12 grossly intact.           Results Review:   I reviewed the patient's new clinical results.    Review of Systems was reviewed and confirmed as accurate as documented by the MA.    Assessment / Plan:    1. Breast cyst, right    2. Breast mass        I did have a detailed and extensive discussion with the patient in the office today and reviewed her recent workup.  I have told the patient that we will schedule her for right breast mammotome biopsy.  Risk of bleeding infection discussed and patient agreeable.  Patient also given Valium 10 mg a preop for the procedure.    Electronically signed by Stuatr Caceres MD  10/09/20  15:00 EDT

## 2020-10-09 ENCOUNTER — OFFICE VISIT (OUTPATIENT)
Dept: SURGERY | Facility: CLINIC | Age: 75
End: 2020-10-09

## 2020-10-09 VITALS
WEIGHT: 156 LBS | DIASTOLIC BLOOD PRESSURE: 80 MMHG | HEIGHT: 63 IN | BODY MASS INDEX: 27.64 KG/M2 | HEART RATE: 80 BPM | OXYGEN SATURATION: 96 % | SYSTOLIC BLOOD PRESSURE: 120 MMHG

## 2020-10-09 DIAGNOSIS — N63.0 BREAST MASS: ICD-10-CM

## 2020-10-09 DIAGNOSIS — N60.01 BREAST CYST, RIGHT: Primary | ICD-10-CM

## 2020-10-09 PROCEDURE — 99213 OFFICE O/P EST LOW 20 MIN: CPT | Performed by: SURGERY

## 2020-10-16 NOTE — PROGRESS NOTES
"Patient: Divine Banuelos  YOB: 1945    Date: 10/23/2020    Primary Care Provider: Queenie Amezcua APRN    No chief complaint on file.      History: ***    {Hx  Review:70530}    Review of Systems    Vital Signs  There were no vitals filed for this visit.    Allergies:  No Known Allergies    Medications:    Current Outpatient Medications:   •  aspirin 81 MG EC tablet, Take 81 mg by mouth daily., Disp: , Rfl:   •  Azelastine HCl 137 MCG/SPRAY solution, 1 spray into the nostril(s) as directed by provider 2 (Two) Times a Day., Disp: , Rfl: 4  •  Calcium Citrate (CITRACAL PO), Take 1 tablet by mouth Daily. + Magnesium , Disp: , Rfl:   •  levothyroxine (SYNTHROID, LEVOTHROID) 25 MCG tablet, Take 25 mcg by mouth daily., Disp: , Rfl:   •  Misc Natural Products (OSTEO BI-FLEX TRIPLE STRENGTH PO), Take 1 tablet by mouth 2 (Two) Times a Day., Disp: , Rfl:   •  Misc. Devices (GELLHORN PESSARY) misc, Use as directed (Size 3), Disp: 1 each, Rfl: 0  •  Omega-3 Fatty Acids (FISH OIL) 1200 MG capsule capsule, Take 1,200 mg by mouth 2 (two) times a day., Disp: , Rfl:   •  PARoxetine (PAXIL) 10 MG tablet, Take 10 mg by mouth Every Morning., Disp: , Rfl:     Physical Exam:   General Appearance:    Alert, cooperative, in no acute distress   Head:    Normocephalic, without obvious abnormality, atraumatic   Lungs:     Clear to auscultation,respirations regular, even and                  unlabored    Heart:    Regular rhythm and normal rate, normal S1 and S2, no            murmur, no gallop, no rub, no click   Abdomen:     Normal bowel sounds, no masses, no organomegaly, soft        non-tender, non-distended, no guarding, no rebound                tenderness   Extremities:   Moves all extremities well, no edema, no cyanosis, no             redness   Pulses:   Pulses palpable and equal bilaterally   Skin:   No bleeding, bruising or rash     Results Review:   {Results Review:69954::\"I reviewed the patient's new clinical " "results.\"}     {[facility] review:57809}    ASSESSMENT/PLAN:    No diagnosis found.     Electronically signed by Stacia Howell  10/16/20        "

## 2020-10-20 ENCOUNTER — OFFICE VISIT (OUTPATIENT)
Dept: OBSTETRICS AND GYNECOLOGY | Facility: CLINIC | Age: 75
End: 2020-10-20

## 2020-10-20 VITALS
BODY MASS INDEX: 28.14 KG/M2 | WEIGHT: 158.8 LBS | SYSTOLIC BLOOD PRESSURE: 130 MMHG | DIASTOLIC BLOOD PRESSURE: 70 MMHG | HEIGHT: 63 IN

## 2020-10-20 DIAGNOSIS — Z46.89 PESSARY MAINTENANCE: Primary | ICD-10-CM

## 2020-10-20 DIAGNOSIS — N81.11 CYSTOCELE, MIDLINE: ICD-10-CM

## 2020-10-20 PROCEDURE — 99212 OFFICE O/P EST SF 10 MIN: CPT | Performed by: PHYSICIAN ASSISTANT

## 2020-10-20 NOTE — PROGRESS NOTES
Subjective   Chief Complaint   Patient presents with   • Pessary Check     doing well       Divine Banuelos is a 75 y.o. year old  presenting to be seen for routine pessary maintenance  She has no complaints or concerns  Voiding well. No problems with bowel movements. No vaginal bleeding       Past Medical History:   Diagnosis Date   • Anxiety    • Arthritis    • Bell's palsy    • Depression    • Hyperlipidemia    • Hypertension    • Hypothyroidism    • Osteoporosis    • Pessary maintenance         Current Outpatient Medications:   •  aspirin 81 MG EC tablet, Take 81 mg by mouth daily., Disp: , Rfl:   •  Azelastine HCl 137 MCG/SPRAY solution, 1 spray into the nostril(s) as directed by provider 2 (Two) Times a Day., Disp: , Rfl: 4  •  Calcium Citrate (CITRACAL PO), Take 1 tablet by mouth Daily. + Magnesium , Disp: , Rfl:   •  levothyroxine (SYNTHROID, LEVOTHROID) 25 MCG tablet, Take 25 mcg by mouth daily., Disp: , Rfl:   •  Misc Natural Products (OSTEO BI-FLEX TRIPLE STRENGTH PO), Take 1 tablet by mouth 2 (Two) Times a Day., Disp: , Rfl:   •  Misc. Devices (GELLHORN PESSARY) misc, Use as directed (Size 3), Disp: 1 each, Rfl: 0  •  Omega-3 Fatty Acids (FISH OIL) 1200 MG capsule capsule, Take 1,200 mg by mouth 2 (two) times a day., Disp: , Rfl:   •  PARoxetine (PAXIL) 10 MG tablet, Take 10 mg by mouth Every Morning., Disp: , Rfl:    No Known Allergies   Past Surgical History:   Procedure Laterality Date   • CATARACT EXTRACTION Bilateral    • CATARACT EXTRACTION, BILATERAL     • RETINAL DETACHMENT REPAIR        Social History     Socioeconomic History   • Marital status:      Spouse name: Not on file   • Number of children: Not on file   • Years of education: Not on file   • Highest education level: Not on file   Tobacco Use   • Smoking status: Never Smoker   • Smokeless tobacco: Never Used   Substance and Sexual Activity   • Alcohol use: No   • Drug use: No   • Sexual activity: Defer     Birth  "control/protection: Post-menopausal   Social History Narrative    Right hand dominant      Family History   Problem Relation Age of Onset   • Hypertension Mother    • Colon cancer Father    • Colon cancer Paternal Grandmother    • Colon cancer Paternal Uncle    • Breast cancer Maternal Aunt        Review of Systems   Constitutional: Negative for chills, diaphoresis and fever.   Gastrointestinal: Negative for abdominal pain, nausea and vomiting.   Genitourinary: Negative for difficulty urinating, dysuria, pelvic pain, vaginal bleeding and vaginal discharge.           Objective   /70   Ht 160 cm (63\")   Wt 72 kg (158 lb 12.8 oz)   LMP  (LMP Unknown)   Breastfeeding No   BMI 28.13 kg/m²     Physical Exam  Constitutional:       Appearance: Normal appearance. She is well-groomed and normal weight.   Eyes:      General: Lids are normal.      Extraocular Movements: Extraocular movements intact.      Conjunctiva/sclera: Conjunctivae normal.   Abdominal:      Palpations: Abdomen is soft. There is no mass.      Tenderness: There is no abdominal tenderness.   Genitourinary:     Labia:         Right: No rash, tenderness or lesion.         Left: No rash, tenderness or lesion.       Urethra: No prolapse, urethral pain, urethral swelling or urethral lesion.      Comments: Ring with support pessary removed, cleaned and reinserted  No vaginal wall erosion or abrasions  Grade 2 cystocele  Musculoskeletal: Normal range of motion.   Skin:     General: Skin is warm and dry.      Findings: No lesion or rash.   Neurological:      Mental Status: She is alert and oriented to person, place, and time.   Psychiatric:         Attention and Perception: Attention normal.         Mood and Affect: Mood normal.         Speech: Speech normal.         Behavior: Behavior is cooperative.         Thought Content: Thought content normal.              Assessment and Plan  Diagnoses and all orders for this visit:    1. Pessary maintenance " (Primary)    2. Cystocele, midline      Patient Instructions   RTO 2 months or prn             This note was electronically signed.    Denise Hernandez PA-C   October 20, 2020

## 2020-10-22 ENCOUNTER — TELEPHONE (OUTPATIENT)
Dept: SURGERY | Facility: CLINIC | Age: 75
End: 2020-10-22

## 2020-10-22 NOTE — PROGRESS NOTES
Location:right breast    Procedure:***      I recommend excision. Procedure and the risks and benefits were explained including bleeding and infection. The patient understands these and wishes to proceed.     The patient was brought to the procedure room. Consent and time out were performed. The area was prepped and draped in the usual fashion. 1% lidocaine with epinephrine was infused locally. An ellyptical incision was made around the lesion. Full thickness excision was performed. The lesion size was *** cm. The wound was closed in layers with interrupted simple vicryl and Nylon for the skin. Wound closure size was *** cm. There were no complications and the patient tolerated the procedure well. Hemostasis was well controlled with pressure and there was minimal blood loss. Wound instructions were given.

## 2020-10-23 ENCOUNTER — PROCEDURE VISIT (OUTPATIENT)
Dept: SURGERY | Facility: CLINIC | Age: 75
End: 2020-10-23

## 2020-10-23 VITALS
WEIGHT: 158 LBS | TEMPERATURE: 97.8 F | OXYGEN SATURATION: 98 % | SYSTOLIC BLOOD PRESSURE: 150 MMHG | DIASTOLIC BLOOD PRESSURE: 70 MMHG | BODY MASS INDEX: 28 KG/M2 | HEIGHT: 63 IN | HEART RATE: 62 BPM

## 2020-10-23 DIAGNOSIS — R92.8 OTHER ABNORMAL AND INCONCLUSIVE FINDINGS ON DIAGNOSTIC IMAGING OF BREAST: ICD-10-CM

## 2020-10-23 DIAGNOSIS — N63.0 BREAST MASS: Primary | ICD-10-CM

## 2020-10-23 PROCEDURE — 19083 BX BREAST 1ST LESION US IMAG: CPT | Performed by: SURGERY

## 2020-10-23 RX ORDER — DIAZEPAM 10 MG/1
TABLET ORAL
COMMUNITY
Start: 2020-10-16 | End: 2020-11-02

## 2020-10-23 NOTE — PROGRESS NOTES
Procedure:  right breast    I recommend a Mammotome biopsy of the right breast lesion. The procedure and risks were clearly explained including bleeding, infection and requirement for re-biopsy and the patient understood these and wishes to proceed.    The patient was brought to the procedure room. Consent and time out were performed. The area was prepped and draped in the usual fashion. 1% lidocaine with epinephrine was infused locally. A small incision was made and under ultrasound guidance the lesion was biopsied with the 8G Mammotome. Clip was placed prior to removal of the Mammotome device. Minimal blood loss had occurred and hemostasis had been well controlled with pressure. Steri-strips were applied to the wound. The patient tolerated the procedure and there were no complications. We will make a f/u appointment to discuss results.

## 2020-10-23 NOTE — PROGRESS NOTES
Procedure:  Right breast mammotome    I recommend a Mammotome biopsy of the {left/right:851876} breast lesion. The procedure and risks were clearly explained including bleeding, infection and requirement for re-biopsy and the patient understood these and wishes to proceed.    The patient was brought to the procedure room. Consent and time out were performed. The area was prepped and draped in the usual fashion. 1% lidocaine with epinephrine was infused locally. A small incision was made and under ultrasound guidance the lesion was biopsied with the 8G Mammotome. Clip was placed prior to removal of the Mammotome device. Minimal blood loss had occurred and hemostasis had been well controlled with pressure. Steri-strips were applied to the wound. The patient tolerated the procedure and there were no complications. We will make a f/u appointment to discuss results.

## 2020-10-28 NOTE — PROGRESS NOTES
"Patient: Divine Banuelos  YOB: 1945    Date: 10/30/2020    Primary Care Provider: Queenie Amezcua APRN    Chief Complaint   Patient presents with   • Follow-up       History: Patient is in the office today for evaluation and follow up from their recent right breast mammotome. The pathology report did show mammary carcinoma with papillary features. Patient states they are doing well and have no complaints.  Patient had many questions about the cancer, request treatment and removal.  Minimal pain from biopsy.    The following portions of the patient's history were reviewed and updated as appropriate: allergies, current medications, past family history, past medical history, past social history, past surgical history and problem list.    Review of Systems   Constitutional: Negative for chills, fever and unexpected weight change.   HENT: Negative for trouble swallowing and voice change.    Eyes: Negative for visual disturbance.   Respiratory: Negative for apnea, cough, chest tightness and wheezing.    Cardiovascular: Negative for chest pain, palpitations and leg swelling.   Gastrointestinal: Negative for abdominal distention, abdominal pain, anal bleeding, blood in stool, constipation, diarrhea, nausea, rectal pain and vomiting.   Endocrine: Negative for cold intolerance and heat intolerance.   Genitourinary: Negative for difficulty urinating, dysuria, flank pain and hematuria.   Musculoskeletal: Negative for back pain, gait problem and joint swelling.   Skin: Negative for color change, rash and wound.   Neurological: Negative for dizziness, syncope, speech difficulty, weakness, numbness and headaches.   Hematological: Negative for adenopathy. Does not bruise/bleed easily.   Psychiatric/Behavioral: Negative for confusion. The patient is not nervous/anxious.        Vital Signs  Vitals:    10/30/20 1253   BP: 120/80   Pulse: 53   SpO2: 99%   Weight: 71.2 kg (157 lb)   Height: 160 cm (63\") "       Allergies:  No Known Allergies    Medications:    Current Outpatient Medications:   •  aspirin 81 MG EC tablet, Take 81 mg by mouth daily., Disp: , Rfl:   •  Azelastine HCl 137 MCG/SPRAY solution, 1 spray into the nostril(s) as directed by provider 2 (Two) Times a Day., Disp: , Rfl: 4  •  Calcium Citrate (CITRACAL PO), Take 1 tablet by mouth Daily. + Magnesium , Disp: , Rfl:   •  levothyroxine (SYNTHROID, LEVOTHROID) 25 MCG tablet, Take 25 mcg by mouth daily., Disp: , Rfl:   •  Misc Natural Products (OSTEO BI-FLEX TRIPLE STRENGTH PO), Take 1 tablet by mouth 2 (Two) Times a Day., Disp: , Rfl:   •  Misc. Devices (GELLHORN PESSARY) misc, Use as directed (Size 3), Disp: 1 each, Rfl: 0  •  Omega-3 Fatty Acids (FISH OIL) 1200 MG capsule capsule, Take 1,200 mg by mouth 2 (two) times a day., Disp: , Rfl:   •  PARoxetine (PAXIL) 10 MG tablet, Take 10 mg by mouth Every Morning., Disp: , Rfl:   •  diazePAM (VALIUM) 10 MG tablet, , Disp: , Rfl:     Physical Exam:   General Appearance:    Alert, cooperative, in no acute distress   Head:    Normocephalic, without obvious abnormality, atraumatic   Lungs:     Clear to auscultation,respirations regular, even and                  unlabored    Heart:    Regular rhythm and normal rate, normal S1 and S2, no            murmur, no gallop, no rub, no click  Breast-small breast mass palpated upper outer quadrant right breast.   Abdomen:     Normal bowel sounds, no masses, no organomegaly, soft        non-tender, non-distended, no guarding, no rebound                tenderness   Extremities:   Moves all extremities well, no edema, no cyanosis, no             redness   Pulses:   Pulses palpable and equal bilaterally   Skin:   No bleeding, bruising or rash     Results Review:   I reviewed the patient's new clinical results.     Review of Systems was reviewed and confirmed as accurate as documented by the MA.    ASSESSMENT/PLAN:    1. Malignant neoplasm of upper-outer quadrant of right  breast in female, estrogen receptor positive (CMS/HCC)       Patient scheduled for right breast lumpectomy with sentinel lymph node biopsy.  Risk of bleeding infection discussed and patient agreeable.  Patient also understands she will be seen by oncology afterwards to decide whether she needs radiotherapy or adjuvant therapy.  Patient understands agreeable and had no further questions at the end of the visit.    Electronically signed by Stuart Caceres MD  10/30/20

## 2020-10-30 ENCOUNTER — OFFICE VISIT (OUTPATIENT)
Dept: SURGERY | Facility: CLINIC | Age: 75
End: 2020-10-30

## 2020-10-30 VITALS
HEART RATE: 53 BPM | WEIGHT: 157 LBS | SYSTOLIC BLOOD PRESSURE: 120 MMHG | DIASTOLIC BLOOD PRESSURE: 80 MMHG | BODY MASS INDEX: 27.82 KG/M2 | OXYGEN SATURATION: 99 % | HEIGHT: 63 IN

## 2020-10-30 DIAGNOSIS — Z17.0 MALIGNANT NEOPLASM OF UPPER-OUTER QUADRANT OF RIGHT BREAST IN FEMALE, ESTROGEN RECEPTOR POSITIVE (HCC): Primary | ICD-10-CM

## 2020-10-30 DIAGNOSIS — Z01.818 PRE-OP TESTING: Primary | ICD-10-CM

## 2020-10-30 DIAGNOSIS — C50.411 MALIGNANT NEOPLASM OF UPPER-OUTER QUADRANT OF RIGHT BREAST IN FEMALE, ESTROGEN RECEPTOR POSITIVE (HCC): Primary | ICD-10-CM

## 2020-10-30 PROCEDURE — 99213 OFFICE O/P EST LOW 20 MIN: CPT | Performed by: SURGERY

## 2020-11-02 ENCOUNTER — APPOINTMENT (OUTPATIENT)
Dept: PREADMISSION TESTING | Facility: HOSPITAL | Age: 75
End: 2020-11-02

## 2020-11-02 VITALS
DIASTOLIC BLOOD PRESSURE: 69 MMHG | WEIGHT: 156.2 LBS | OXYGEN SATURATION: 96 % | SYSTOLIC BLOOD PRESSURE: 139 MMHG | HEART RATE: 55 BPM | HEIGHT: 63 IN | BODY MASS INDEX: 27.68 KG/M2

## 2020-11-02 DIAGNOSIS — Z01.818 PREOP TESTING: Primary | ICD-10-CM

## 2020-11-02 LAB
ANION GAP SERPL CALCULATED.3IONS-SCNC: 11.6 MMOL/L (ref 5–15)
BUN SERPL-MCNC: 16 MG/DL (ref 8–23)
BUN/CREAT SERPL: 19 (ref 7–25)
CALCIUM SPEC-SCNC: 9.1 MG/DL (ref 8.6–10.5)
CHLORIDE SERPL-SCNC: 103 MMOL/L (ref 98–107)
CO2 SERPL-SCNC: 24.4 MMOL/L (ref 22–29)
CREAT SERPL-MCNC: 0.84 MG/DL (ref 0.57–1)
DEPRECATED RDW RBC AUTO: 41.1 FL (ref 37–54)
ERYTHROCYTE [DISTWIDTH] IN BLOOD BY AUTOMATED COUNT: 12.6 % (ref 12.3–15.4)
GFR SERPL CREATININE-BSD FRML MDRD: 66 ML/MIN/1.73
GLUCOSE SERPL-MCNC: 92 MG/DL (ref 65–99)
HCT VFR BLD AUTO: 40.1 % (ref 34–46.6)
HGB BLD-MCNC: 13 G/DL (ref 12–15.9)
MCH RBC QN AUTO: 29.2 PG (ref 26.6–33)
MCHC RBC AUTO-ENTMCNC: 32.4 G/DL (ref 31.5–35.7)
MCV RBC AUTO: 90.1 FL (ref 79–97)
PLATELET # BLD AUTO: 302 10*3/MM3 (ref 140–450)
PMV BLD AUTO: 10.2 FL (ref 6–12)
POTASSIUM SERPL-SCNC: 4.1 MMOL/L (ref 3.5–5.2)
RBC # BLD AUTO: 4.45 10*6/MM3 (ref 3.77–5.28)
SODIUM SERPL-SCNC: 139 MMOL/L (ref 136–145)
WBC # BLD AUTO: 6.89 10*3/MM3 (ref 3.4–10.8)

## 2020-11-02 PROCEDURE — U0004 COV-19 TEST NON-CDC HGH THRU: HCPCS

## 2020-11-02 PROCEDURE — 85027 COMPLETE CBC AUTOMATED: CPT

## 2020-11-02 PROCEDURE — 93005 ELECTROCARDIOGRAM TRACING: CPT

## 2020-11-02 PROCEDURE — 36415 COLL VENOUS BLD VENIPUNCTURE: CPT

## 2020-11-02 PROCEDURE — 80048 BASIC METABOLIC PNL TOTAL CA: CPT

## 2020-11-02 PROCEDURE — 93010 ELECTROCARDIOGRAM REPORT: CPT | Performed by: INTERNAL MEDICINE

## 2020-11-02 PROCEDURE — C9803 HOPD COVID-19 SPEC COLLECT: HCPCS

## 2020-11-02 NOTE — DISCHARGE INSTRUCTIONS
PAT PASS GIVEN/REVIEWED WITH PT.  VERBALIZED UNDERSTANDING OF THE FOLLOWING:  DO NOT EAT, DRINK, SMOKE, USE SMOKELESS TOBACCO OR CHEW GUM AFTER MIDNIGHT THE NIGHT BEFORE SURGERY.  THIS ALSO INCLUDES HARD CANDIES AND MINTS.    DO NOT SHAVE THE AREA TO BE OPERATED ON AT LEAST 48 HOURS PRIOR TO THE PROCEDURE.  DO NOT WEAR MAKE UP OR NAIL POLISH.  DO NOT LEAVE IN ANY PIERCING OR WEAR JEWELRY THE DAY OF SURGERY.      DO NOT USE ADHESIVES IF YOU WEAR DENTURES.    DO NOT WEAR EYE CONTACTS; BRING IN YOUR GLASSES.    ONLY TAKE MEDICATION THE MORNING OF YOUR PROCEDURE IF INSTRUCTED BY YOUR SURGEON WITH ENOUGH WATER TO SWALLOW THE MEDICATION.  IF YOUR SURGEON DID NOT SPECIFY WHICH MEDICATIONS TO TAKE, YOU WILL NEED TO CALL THEIR OFFICE FOR FURTHER INSTRUCTIONS AND DO AS THEY INSTRUCT.    LEAVE ANYTHING YOU CONSIDER VALUABLE AT HOME.    YOU WILL NEED TO ARRANGE FOR SOMEONE TO DRIVE YOU HOME AFTER SURGERY.  IT IS RECOMMENDED THAT YOU DO NOT DRIVE, WORK, DRINK ALCOHOL OR MAKE MAJOR DECISIONS FOR AT LEAST 24 HOURS AFTER YOUR PROCEDURE IS COMPLETE.      THE DAY OF YOUR PROCEDURE, BRING IN THE FOLLOWING IF APPLICABLE:   PICTURE ID AND INSURANCE/MEDICARE OR MEDICAID CARDS/ANY CO-PAY THAT MAY BE DUE   COPY OF ADVANCED DIRECTIVE/LIVING WILL/POWER OR    CPAP/BIPAP/INHALERS   SKIN PREP SHEET   YOUR PREADMISSION TESTING PASS (IF NOT A PHONE HISTORY)        PAT patient education COVID testing pre-op instructions reviewed with pt.  Verbalized understanding.      Chlorhexidine wipes along with instruction/verification sheet given to pt.  Instructed pt to apply stickers from chlorhexidine wipes to verification sheet once skin prep has been  completed, and to return to [facility] the day of the procedure.  Pt. Verbalizes understanding.

## 2020-11-03 LAB — SARS-COV-2 RNA RESP QL NAA+PROBE: NOT DETECTED

## 2020-11-04 ENCOUNTER — ANESTHESIA EVENT (OUTPATIENT)
Dept: PERIOP | Facility: HOSPITAL | Age: 75
End: 2020-11-04

## 2020-11-04 ENCOUNTER — HOSPITAL ENCOUNTER (OUTPATIENT)
Facility: HOSPITAL | Age: 75
Setting detail: HOSPITAL OUTPATIENT SURGERY
Discharge: HOME | End: 2020-11-04
Attending: SURGERY | Admitting: SURGERY

## 2020-11-04 ENCOUNTER — APPOINTMENT (OUTPATIENT)
Dept: NUCLEAR MEDICINE | Facility: HOSPITAL | Age: 75
End: 2020-11-04

## 2020-11-04 ENCOUNTER — ANESTHESIA (OUTPATIENT)
Dept: PERIOP | Facility: HOSPITAL | Age: 75
End: 2020-11-04

## 2020-11-04 VITALS
SYSTOLIC BLOOD PRESSURE: 163 MMHG | DIASTOLIC BLOOD PRESSURE: 76 MMHG | RESPIRATION RATE: 14 BRPM | TEMPERATURE: 97.3 F | HEART RATE: 62 BPM | OXYGEN SATURATION: 96 %

## 2020-11-04 DIAGNOSIS — Z17.0 MALIGNANT NEOPLASM OF UPPER-OUTER QUADRANT OF RIGHT BREAST IN FEMALE, ESTROGEN RECEPTOR POSITIVE (HCC): ICD-10-CM

## 2020-11-04 DIAGNOSIS — C50.411 MALIGNANT NEOPLASM OF UPPER-OUTER QUADRANT OF RIGHT BREAST IN FEMALE, ESTROGEN RECEPTOR POSITIVE (HCC): ICD-10-CM

## 2020-11-04 PROCEDURE — 25010000002 PROPOFOL 200 MG/20ML EMULSION: Performed by: NURSE ANESTHETIST, CERTIFIED REGISTERED

## 2020-11-04 PROCEDURE — A9541 TC99M SULFUR COLLOID: HCPCS | Performed by: SURGERY

## 2020-11-04 PROCEDURE — 25010000003 CEFAZOLIN SODIUM-DEXTROSE 2-3 GM-%(50ML) RECONSTITUTED SOLUTION: Performed by: SURGERY

## 2020-11-04 PROCEDURE — 88341 IMHCHEM/IMCYTCHM EA ADD ANTB: CPT | Performed by: SURGERY

## 2020-11-04 PROCEDURE — 88342 IMHCHEM/IMCYTCHM 1ST ANTB: CPT | Performed by: SURGERY

## 2020-11-04 PROCEDURE — 25010000002 ONDANSETRON PER 1 MG: Performed by: NURSE ANESTHETIST, CERTIFIED REGISTERED

## 2020-11-04 PROCEDURE — 38792 RA TRACER ID OF SENTINL NODE: CPT

## 2020-11-04 PROCEDURE — 25010000003 LIDOCAINE 1 % SOLUTION: Performed by: SURGERY

## 2020-11-04 PROCEDURE — 25010000002 DEXAMETHASONE PER 1 MG: Performed by: NURSE ANESTHETIST, CERTIFIED REGISTERED

## 2020-11-04 PROCEDURE — 38525 BIOPSY/REMOVAL LYMPH NODES: CPT | Performed by: SURGERY

## 2020-11-04 PROCEDURE — 25010000002 ONDANSETRON PER 1 MG

## 2020-11-04 PROCEDURE — 19301 PARTIAL MASTECTOMY: CPT | Performed by: SURGERY

## 2020-11-04 PROCEDURE — 0 TECHNETIUM FILTERED SULFUR COLLOID: Performed by: SURGERY

## 2020-11-04 PROCEDURE — 25010000002 FENTANYL CITRATE (PF) 100 MCG/2ML SOLUTION: Performed by: NURSE ANESTHETIST, CERTIFIED REGISTERED

## 2020-11-04 PROCEDURE — 25010000002 KETOROLAC TROMETHAMINE PER 15 MG: Performed by: NURSE ANESTHETIST, CERTIFIED REGISTERED

## 2020-11-04 PROCEDURE — 88307 TISSUE EXAM BY PATHOLOGIST: CPT | Performed by: SURGERY

## 2020-11-04 DEVICE — LIGACLIP MCA MULTIPLE CLIP APPLIERS, 30 MEDIUM CLIPS
Type: IMPLANTABLE DEVICE | Site: BREAST | Status: FUNCTIONAL
Brand: LIGACLIP

## 2020-11-04 RX ORDER — CEFAZOLIN SODIUM 2 G/50ML
2 SOLUTION INTRAVENOUS ONCE
Status: COMPLETED | OUTPATIENT
Start: 2020-11-04 | End: 2020-11-04

## 2020-11-04 RX ORDER — ONDANSETRON 2 MG/ML
INJECTION INTRAMUSCULAR; INTRAVENOUS
Status: COMPLETED
Start: 2020-11-04 | End: 2020-11-04

## 2020-11-04 RX ORDER — HYDROCODONE BITARTRATE AND ACETAMINOPHEN 5; 325 MG/1; MG/1
1-2 TABLET ORAL EVERY 4 HOURS PRN
Qty: 14 TABLET | Refills: 0 | Status: SHIPPED | OUTPATIENT
Start: 2020-11-04 | End: 2020-12-09

## 2020-11-04 RX ORDER — PROPOFOL 10 MG/ML
INJECTION, EMULSION INTRAVENOUS AS NEEDED
Status: DISCONTINUED | OUTPATIENT
Start: 2020-11-04 | End: 2020-11-04 | Stop reason: SURG

## 2020-11-04 RX ORDER — MEPERIDINE HYDROCHLORIDE 25 MG/ML
12.5 INJECTION INTRAMUSCULAR; INTRAVENOUS; SUBCUTANEOUS ONCE
Status: DISCONTINUED | OUTPATIENT
Start: 2020-11-04 | End: 2020-11-04 | Stop reason: HOSPADM

## 2020-11-04 RX ORDER — DEXAMETHASONE SODIUM PHOSPHATE 4 MG/ML
INJECTION, SOLUTION INTRA-ARTICULAR; INTRALESIONAL; INTRAMUSCULAR; INTRAVENOUS; SOFT TISSUE AS NEEDED
Status: DISCONTINUED | OUTPATIENT
Start: 2020-11-04 | End: 2020-11-04 | Stop reason: SURG

## 2020-11-04 RX ORDER — FENTANYL CITRATE 50 UG/ML
INJECTION, SOLUTION INTRAMUSCULAR; INTRAVENOUS AS NEEDED
Status: DISCONTINUED | OUTPATIENT
Start: 2020-11-04 | End: 2020-11-04 | Stop reason: SURG

## 2020-11-04 RX ORDER — ONDANSETRON 2 MG/ML
INJECTION INTRAMUSCULAR; INTRAVENOUS AS NEEDED
Status: DISCONTINUED | OUTPATIENT
Start: 2020-11-04 | End: 2020-11-04 | Stop reason: SURG

## 2020-11-04 RX ORDER — MAGNESIUM HYDROXIDE 1200 MG/15ML
LIQUID ORAL AS NEEDED
Status: DISCONTINUED | OUTPATIENT
Start: 2020-11-04 | End: 2020-11-04 | Stop reason: HOSPADM

## 2020-11-04 RX ORDER — LIDOCAINE HYDROCHLORIDE 10 MG/ML
INJECTION, SOLUTION INFILTRATION; PERINEURAL AS NEEDED
Status: DISCONTINUED | OUTPATIENT
Start: 2020-11-04 | End: 2020-11-04 | Stop reason: HOSPADM

## 2020-11-04 RX ORDER — ONDANSETRON 2 MG/ML
4 INJECTION INTRAMUSCULAR; INTRAVENOUS ONCE
Status: COMPLETED | OUTPATIENT
Start: 2020-11-04 | End: 2020-11-04

## 2020-11-04 RX ORDER — SODIUM CHLORIDE, SODIUM LACTATE, POTASSIUM CHLORIDE, CALCIUM CHLORIDE 600; 310; 30; 20 MG/100ML; MG/100ML; MG/100ML; MG/100ML
1000 INJECTION, SOLUTION INTRAVENOUS CONTINUOUS
Status: DISCONTINUED | OUTPATIENT
Start: 2020-11-04 | End: 2020-11-04 | Stop reason: HOSPADM

## 2020-11-04 RX ORDER — LIDOCAINE HYDROCHLORIDE 20 MG/ML
INJECTION, SOLUTION INTRAVENOUS AS NEEDED
Status: DISCONTINUED | OUTPATIENT
Start: 2020-11-04 | End: 2020-11-04 | Stop reason: SURG

## 2020-11-04 RX ORDER — ONDANSETRON 2 MG/ML
4 INJECTION INTRAMUSCULAR; INTRAVENOUS ONCE AS NEEDED
Status: DISCONTINUED | OUTPATIENT
Start: 2020-11-04 | End: 2020-11-04 | Stop reason: HOSPADM

## 2020-11-04 RX ORDER — KETOROLAC TROMETHAMINE 30 MG/ML
INJECTION, SOLUTION INTRAMUSCULAR; INTRAVENOUS AS NEEDED
Status: DISCONTINUED | OUTPATIENT
Start: 2020-11-04 | End: 2020-11-04 | Stop reason: SURG

## 2020-11-04 RX ADMIN — FENTANYL CITRATE 25 MCG: 50 INJECTION INTRAMUSCULAR; INTRAVENOUS at 13:05

## 2020-11-04 RX ADMIN — DEXAMETHASONE SODIUM PHOSPHATE 4 MG: 4 INJECTION, SOLUTION INTRAMUSCULAR; INTRAVENOUS at 12:49

## 2020-11-04 RX ADMIN — PROPOFOL 50 MG: 10 INJECTION, EMULSION INTRAVENOUS at 12:45

## 2020-11-04 RX ADMIN — GLYCOPYRROLATE 0.2 MG: 0.2 INJECTION, SOLUTION INTRAMUSCULAR; INTRAVENOUS at 13:15

## 2020-11-04 RX ADMIN — SODIUM CHLORIDE, POTASSIUM CHLORIDE, SODIUM LACTATE AND CALCIUM CHLORIDE 1000 ML: 600; 310; 30; 20 INJECTION, SOLUTION INTRAVENOUS at 12:11

## 2020-11-04 RX ADMIN — FENTANYL CITRATE 50 MCG: 50 INJECTION INTRAMUSCULAR; INTRAVENOUS at 12:40

## 2020-11-04 RX ADMIN — ONDANSETRON 4 MG: 2 INJECTION INTRAMUSCULAR; INTRAVENOUS at 14:52

## 2020-11-04 RX ADMIN — ONDANSETRON 4 MG: 2 INJECTION INTRAMUSCULAR; INTRAVENOUS at 12:49

## 2020-11-04 RX ADMIN — PROPOFOL 100 MG: 10 INJECTION, EMULSION INTRAVENOUS at 12:44

## 2020-11-04 RX ADMIN — TECHNETIUM TC 99M SULFUR COLLOID 1 DOSE: KIT at 12:20

## 2020-11-04 RX ADMIN — CEFAZOLIN SODIUM 2 G: 2 SOLUTION INTRAVENOUS at 12:40

## 2020-11-04 RX ADMIN — LIDOCAINE HYDROCHLORIDE 40 MG: 20 INJECTION, SOLUTION INTRAVENOUS at 12:46

## 2020-11-04 RX ADMIN — KETOROLAC TROMETHAMINE 30 MG: 30 INJECTION, SOLUTION INTRAMUSCULAR at 12:49

## 2020-11-04 NOTE — ANESTHESIA POSTPROCEDURE EVALUATION
Patient: Divine Banuelos    Procedure Summary     Date: 11/04/20 Room / Location: [facility] OR 1 /  [facility]    Anesthesia Start: 1240 Anesthesia Stop:     Procedure: BREAST LUMPECTOMY WITH SENTINEL NODE BIOPSY RIGHT (Right Breast) Diagnosis:       Malignant neoplasm of upper-outer quadrant of right breast in female, estrogen receptor positive (CMS/HCC)      (Malignant neoplasm of upper-outer quadrant of right breast in female, estrogen receptor positive (CMS/HCC) [C50.411, Z17.0])    Surgeon: Stuart Caceres MD Provider: Eyad Govea CRNA    Anesthesia Type: general ASA Status: 3          Anesthesia Type: general    Vitals  HR 80  Sat 95  Resp 13  Temp 136/55  /55        Post Anesthesia Care and Evaluation    Patient location during evaluation: PACU  Patient participation: complete - patient participated  Level of consciousness: awake and alert  Pain score: 0  Pain management: satisfactory to patient  Airway patency: patent  Anesthetic complications: No anesthetic complications    Cardiovascular status: acceptable and stable  Respiratory status: acceptable and face mask  Hydration status: acceptable

## 2020-11-04 NOTE — DISCHARGE INSTRUCTIONS
Please follow all post op instructions and follow up appointment time from your physician's office included in your discharge packet.    Remove dressings in two days.  .   No pushing, pulling, tugging,  heavy lifting, or strenuous activity.  No major decision making, driving, or drinking alcoholic beverages for 24 hours. ( due to the medications you have  received)  Always use good hand hygiene/washing techniques.  NO driving while taking pain medications.    * if you have an incision:  Check your incision area every day for signs of infection.   Check for:  * more redness, swelling, or pain  *more fluid or blood  *warmth  *pus or bad smell  To assist you in voiding:  Drink plenty of fluids  Listen to running water while attempting to void.    If you are unable to urinate and you have an uncomfortable urge to void or it has been   6 hours since you were discharged, return to the Emergency Room

## 2020-11-04 NOTE — ANESTHESIA PREPROCEDURE EVALUATION
Anesthesia Evaluation     Patient summary reviewed and Nursing notes reviewed   no history of anesthetic complications:  NPO Solid Status: > 8 hours  NPO Liquid Status: > 8 hours           Airway   Mallampati: I  TM distance: >3 FB  Neck ROM: full  no difficulty expected  Dental - normal exam     Pulmonary - normal exam   (+) shortness of breath,   Cardiovascular - normal exam    Rhythm: regular  Rate: normal    (+) hypertension (No Meds), valvular problems/murmurs MVP, dysrhythmias Bradycardia, RECINOS, hyperlipidemia,     ROS comment: Echo 09/2019  Interpretation Summary    · Left ventricular systolic function is normal.  · Estimated EF appears to be in the range of 56 - 60%.  · Left ventricular diastolic dysfunction (grade II) consistent with pseudonormalization.  · Left atrial cavity size is borderline dilated.  · Mild mitral valve regurgitation is present        Neuro/Psych  (+) headaches, numbness,     GI/Hepatic/Renal/Endo    (+)  GERD,  thyroid problem hypothyroidism    Musculoskeletal     Abdominal  - normal exam    Abdomen: soft.  Bowel sounds: normal.   Substance History      OB/GYN          Other   arthritis,    history of cancer active                    Anesthesia Plan    ASA 3     general   (Risks and benefits discussed including risk of aspiration, recall and dental damage. All patient questions answered. Will continue with POC.      )  intravenous induction     Anesthetic plan, all risks, benefits, and alternatives have been provided, discussed and informed consent has been obtained with: patient.

## 2020-11-04 NOTE — OP NOTE
"PATIENT:    Divine Banuelos    DATE OF SURGERY:   11/4/2020    PHYSICIAN:    Stuart Caceres MD    REFERRING PHYSICIAN:  Stuart Caceres MD    YOB: 1945    PREOPERATIVE DIAGNOSIS:  right breast cancer    POSTOPERATIVE DIAGNOSIS: right breast cancer    PROCEDURE:  right lumpectomy with sentinel deep lymph node biopsy      OPERATIVE PROCEDURE:  The patient was taken to the operating room in the normal manner.  I did speak with the patient today and marked them accordingly.  An appropriate timeout was performed intraoperatively prior to the incision.  Preoperative IV antibiotics were given.  The patient was prepped and draped in a normal sterile fashion after being placed in the prone position.  General endotracheal anesthesia was given first. She did have injection performed prior to the procedure via the Nuclear Medicine tech.    The corresponding breast was visualized, appropriate incision was made in the outer upper quadrant of the right breast, and lumpectomy was performed in the normal manner with #10 blade knife and hemostasis was obtained via electrocautery.  At this time a lap sponge was placed for packing and further hemostasis.    Then the sentinel lymph node probe was used in the corresponding axilla and a \"hot\" region was located.  A small incision was made and we were able to find the sentinel node which was fairly deep in the right axilla and remove it without difficulty, confirming that it was the \"hot\" node with the lymph node probe.    Packings were then removed, hemostasis was intact, and the breast wound was closed with 4-0 Vicryl suture and steri strips and the axillary wound was likewise closed.    EBL-25 cc.  Specimen sent-right axillary sentinel lymph node, right breast lumpectomy.    The patient was stable at this point in time and was transferred to the recovery room in stable condition.  "

## 2020-11-04 NOTE — ANESTHESIA PROCEDURE NOTES
Airway  Urgency: elective    Date/Time: 11/4/2020 12:46 PM  Airway not difficult    General Information and Staff    Patient location during procedure: OR  CRNA: Eyad Govea CRNA    Indications and Patient Condition  Indications for airway management: airway protection    Preoxygenated: yes  Mask difficulty assessment: 0 - not attempted    Final Airway Details  Final airway type: supraglottic airway      Successful airway: classic  Size 3    Number of attempts at approach: 1    Additional Comments  Cuff pressure/leak less than [id number]

## 2020-11-05 LAB
QT INTERVAL: 434 MS
QTC INTERVAL: 407 MS

## 2020-11-09 NOTE — PROGRESS NOTES
"Patient: Divine Banuelos    YOB: 1945    Date: 11/11/2020    Primary Care Provider: Queenie Amezcua APRN    Chief Complaint   Patient presents with   • Post-op Follow-up     lumpectomy       History of present illness:  I saw the patient in the office today as a followup from their recent lumpectomy.  They state that they have done well and are having no complaints. Pathology report was reviewed and did show carcinoma with no invasion.  [place] lymph node negative.    Vital Signs:   Vitals:    11/11/20 0858   BP: 110/78   Pulse: 58   SpO2: 98%   Weight: 71.7 kg (158 lb)   Height: 160 cm (63\")       Physical Exam:   General Appearance:    Alert, cooperative, in no acute distress   Breast:  Breast incision healed nicely, axillary incision healing, no signs of infection   Abdomen:     no masses, no organomegaly, soft non-tender, non-distended, no guarding, wounds are well healed     Results Review:   I reviewed the patient's new clinical results.    Assessment / Plan:    1. Postoperative visit        I did discuss the situation with the patient today in the office and they have done well from their recent lumpectomy.  I did have a detailed and extensive discussion with the patient about the pathology report, I want the patient to follow-up in 6 months for ultrasound.    Electronically signed by Stuart Caceres MD  [unfilled]                    "

## 2020-11-11 ENCOUNTER — OFFICE VISIT (OUTPATIENT)
Dept: SURGERY | Facility: CLINIC | Age: 75
End: 2020-11-11

## 2020-11-11 VITALS
DIASTOLIC BLOOD PRESSURE: 78 MMHG | OXYGEN SATURATION: 98 % | BODY MASS INDEX: 28 KG/M2 | WEIGHT: 158 LBS | SYSTOLIC BLOOD PRESSURE: 110 MMHG | HEART RATE: 58 BPM | HEIGHT: 63 IN

## 2020-11-11 DIAGNOSIS — Z48.89 POSTOPERATIVE VISIT: Primary | ICD-10-CM

## 2020-11-11 DIAGNOSIS — Z17.0 MALIGNANT NEOPLASM OF UPPER-OUTER QUADRANT OF RIGHT BREAST IN FEMALE, ESTROGEN RECEPTOR POSITIVE (HCC): Primary | ICD-10-CM

## 2020-11-11 DIAGNOSIS — C50.411 MALIGNANT NEOPLASM OF UPPER-OUTER QUADRANT OF RIGHT BREAST IN FEMALE, ESTROGEN RECEPTOR POSITIVE (HCC): Primary | ICD-10-CM

## 2020-11-11 LAB
LAB AP CASE REPORT: NORMAL
LAB AP CLINICAL INFORMATION: NORMAL
PATH REPORT.FINAL DX SPEC: NORMAL

## 2020-11-11 PROCEDURE — 99024 POSTOP FOLLOW-UP VISIT: CPT | Performed by: SURGERY

## 2020-12-09 ENCOUNTER — CONSULT (OUTPATIENT)
Dept: ONCOLOGY | Facility: CLINIC | Age: 75
End: 2020-12-09

## 2020-12-09 VITALS
OXYGEN SATURATION: 98 % | HEART RATE: 43 BPM | WEIGHT: 159 LBS | DIASTOLIC BLOOD PRESSURE: 70 MMHG | BODY MASS INDEX: 28.17 KG/M2 | RESPIRATION RATE: 12 BRPM | SYSTOLIC BLOOD PRESSURE: 141 MMHG | TEMPERATURE: 97.3 F | HEIGHT: 63 IN

## 2020-12-09 DIAGNOSIS — C50.411 MALIGNANT NEOPLASM OF UPPER-OUTER QUADRANT OF RIGHT BREAST IN FEMALE, ESTROGEN RECEPTOR POSITIVE (HCC): Primary | ICD-10-CM

## 2020-12-09 DIAGNOSIS — Z17.0 MALIGNANT NEOPLASM OF UPPER-OUTER QUADRANT OF RIGHT BREAST IN FEMALE, ESTROGEN RECEPTOR POSITIVE (HCC): Primary | ICD-10-CM

## 2020-12-09 PROCEDURE — 99204 OFFICE O/P NEW MOD 45 MIN: CPT | Performed by: INTERNAL MEDICINE

## 2020-12-09 NOTE — PROGRESS NOTES
DATE OF CONSULTATION: 12/9/2020    REFERRING PHYSICIAN: Queenie Amezcua APRN    Dear Queenie Tamayo APRN  Thank you for asking for my medical advice on this patient. I saw her in the  [facility] on 12/9/2020    REASON FOR CONSULTATION: Right breast papilloma    HISTORY OF PRESENT ILLNESS: The patient is a very pleasant 75 y.o.  female   who was in her usual state of health until September 2020.  Patient present with bloody nipple discharge, right side, never had this problem before, this was painless, no palpable masses in the breast, she has been up-to-date with her mammograms never had any abnormalities before that required biopsies.  She was referred to Dr. Caceres who did biopsy that revealed papillary carcinoma this was followed by lumpectomy with a clear surgical margins and no evidence of invasion.  ER/TX were strongly positive and HER-2/susan negative.  The patient was referred to me for further recommendations.    SUBJECTIVE: When I saw the patient today she is here by herself.  She is anxious about with visit.  She denies any fever chills night sweats.  She is complaining of joint tenderness.  No skin rash.  Never been diagnosed with cancer before.    Review of Systems   Constitutional: Negative for activity change, appetite change, chills, fatigue, fever and unexpected weight change.   HENT: Negative for hearing loss, mouth sores, nosebleeds, sore throat and trouble swallowing.    Eyes: Negative for visual disturbance.   Respiratory: Negative for cough, chest tightness, shortness of breath and wheezing.    Cardiovascular: Negative for chest pain, palpitations and leg swelling.   Gastrointestinal: Negative for abdominal distention, abdominal pain, blood in stool, constipation, diarrhea, nausea, rectal pain and vomiting.   Endocrine: Negative for cold intolerance and heat intolerance.   Genitourinary: Negative for difficulty urinating, dysuria, frequency and urgency.   Musculoskeletal: Negative  for arthralgias, back pain, gait problem, joint swelling and myalgias.   Skin: Negative for rash.   Neurological: Negative for dizziness, tremors, syncope, weakness, light-headedness, numbness and headaches.   Hematological: Negative for adenopathy. Does not bruise/bleed easily.   Psychiatric/Behavioral: Negative for confusion, sleep disturbance and suicidal ideas. The patient is not nervous/anxious.        Past Medical History:   Diagnosis Date   • Anxiety    • Arthritis    • Bell's palsy    • Bronchitis    • Cataract     bilateral   • Depression    • GERD (gastroesophageal reflux disease)    • History of exercise stress test 2019    patient states it was normal   • Hyperlipidemia    • Hypertension     not currently taking medication for BP   • Hypothyroidism    • Malignant neoplasm of upper-outer quadrant of right breast in female, estrogen receptor positive (CMS/[facility]) 10/30/2020   • Mitral valve prolapse    • Osteoporosis    • Pessary maintenance    • Wears dentures     upper and lowers       Social History     Socioeconomic History   • Marital status:      Spouse name: Not on file   • Number of children: Not on file   • Years of education: Not on file   • Highest education level: Not on file   Tobacco Use   • Smoking status: Never Smoker   • Smokeless tobacco: Never Used   Substance and Sexual Activity   • Alcohol use: No   • Drug use: No   • Sexual activity: Defer     Birth control/protection: Post-menopausal   Social History Narrative    Right hand dominant       Family History   Problem Relation Age of Onset   • Hypertension Mother    • Colon cancer Father    • Colon cancer Paternal Grandmother    • Colon cancer Paternal Uncle    • Breast cancer Maternal Aunt        Past Surgical History:   Procedure Laterality Date   • BREAST LUMPECTOMY WITH SENTINEL NODE BIOPSY Right 11/4/2020    Procedure: BREAST LUMPECTOMY WITH SENTINEL NODE BIOPSY RIGHT;  Surgeon: Stuart Caceres MD;  [facility];   "Service: General;  Laterality: Right;   • CATARACT EXTRACTION Bilateral    • CATARACT EXTRACTION, BILATERAL     • COLONOSCOPY     • RETINAL DETACHMENT REPAIR Right        No Known Allergies       Current Outpatient Medications:   •  aspirin 81 MG EC tablet, Take 81 mg by mouth daily., Disp: , Rfl:   •  Calcium Citrate (CITRACAL PO), Take 1 tablet by mouth Daily. + Magnesium , Disp: , Rfl:   •  levothyroxine (SYNTHROID, LEVOTHROID) 25 MCG tablet, Take 25 mcg by mouth daily., Disp: , Rfl:   •  Misc Natural Products (OSTEO BI-FLEX TRIPLE STRENGTH PO), Take 1 tablet by mouth 2 (Two) Times a Day., Disp: , Rfl:   •  Omega-3 Fatty Acids (FISH OIL) 1200 MG capsule capsule, Take 1,200 mg by mouth 2 (two) times a day., Disp: , Rfl:   •  PARoxetine (PAXIL) 10 MG tablet, Take 10 mg by mouth Every Morning., Disp: , Rfl:     PHYSICAL EXAMINATION:   /70   Pulse (!) 43   Temp 97.3 °F (36.3 °C) (Temporal)   Resp 12   Ht 160 cm (63\")   Wt 72.1 kg (159 lb)   LMP  (LMP Unknown)   SpO2 98%   BMI 28.17 kg/m²   Pain Score    12/09/20 0936   PainSc: 0-No pain       ECOG Performance Status: 1 - Symptomatic but completely ambulatory  General Appearance:  alert, cooperative, no apparent distress and appears stated age   Neurologic/Psychiatric: A&O x 3, gait steady, appropriate affect, strength 5/5 in all muscle groups   HEENT:  Normocephalic, without obvious abnormality, mucous membranes moist   Neck: Supple, symmetrical, trachea midline, no adenopathy;  No thyromegaly, masses, or tenderness   Lungs:   Clear to auscultation bilaterally; respirations regular, even, and unlabored bilaterally   Heart:  Regular rate and rhythm, no murmurs appreciated   Abdomen:   Soft, non-tender, non-distended and no organomegaly   Lymph nodes: No cervical, supraclavicular, inguinal or axillary adenopathy noted   Extremities: Normal, atraumatic; no clubbing, cyanosis, or edema    Skin: No rashes, ulcers, or suspicious lesions noted       No visits " with results within 2 Week(s) from this visit.   Latest known visit with results is:   Admission on 11/04/2020, Discharged on 11/04/2020   Component Date Value Ref Range Status   • Case Report 11/04/2020    Final                    Value:Surgical Pathology Report                         Case: [id number]                                  Authorizing Provider:  Stuart Caceres MD        Collected:           11/04/2020 01:01 PM          Ordering Location:     [facility] OR Received:            11/05/2020 08:44 AM          Pathologist:           Codi Curry MD                                                   Specimens:   1) - Breast, Right, RIGHT BREAST LUMPECTOMY                                                         2) - [place] Lymph Node, RIGHT SENTINEL NODE BIOPSY                                      • Clinical Information 11/04/2020    Final                    Value:This result contains rich text formatting which cannot be displayed here.   • Final Diagnosis 11/04/2020    Final                    Value:This result contains rich text formatting which cannot be displayed here.        No results found.      DIAGNOSTIC DATA:   1. Radiology:    Pet EXAMINATION: US BREAST RIGHT LIMITED-, MAMMO DIAGNOSTIC DIGITAL  TOMOSYNTHESIS RIGHT W CAD-      CLINICAL INDICATION:  nipple discharge; N64.52-Nipple discharge     TECHNIQUE: Right CC and MLO views were obtained with both 2-D and 3-D  acquisitions. The study was read with the assistance of CAD.      COMPARISON: 01/10/2020 and 06/21/2018.     FINDINGS:  The breast parenchyma is heterogenously dense, which may  obscure small masses. There are no suspicious masses or clusters of  calcifications.     Right breast ultrasound:  In the right breast 8:00 position there is a 7 mm intramammary lymph  node. In the 9:00 position 5 cm from the nipple there is a 6 mm  well-circumscribed oval hypoechoic mass with no evidence of internal  blood flow. A smaller,  5 mm well-circumscribed hypoechoic nodule is  identified. In the 9:00 position 4 cm from the nipple there is a 4 mm  well-circumscribed oval hypoechoic mass.     IMPRESSION:  BI-RADS CATEGORY: 3 , PROBABLY BENIGN FINDING(S).      Recommendations:  Follow-up ultrasound in six months is recommended.  This report will stand as the attending for the follow-up exams..           NOTES: Mammography does not detect approximately 10-15% of breast  cancers. Physical examination of the breasts by a physician and regular  monthly breast self examinations are integral parts of breast cancer  screening.  A normal mammogram does not exclude breast cancer if there  is an abnormal finding on physical examination. When clinically  indicated, a biopsy should not be postponed because of a normal  mammogram report.  [place]. [id number]     NOTE: If a biopsy is performed on this patient, a copy of the pathology  report would be appreciated.     This report was finalized on 6/18/2020 11:05 AM by Zoltan Brock,    2. Dr. Caceres's note reviewed by me and documented in the  chart.   3. Pathology report: 1.  Right breast biopsy 2/23/2020 + for papillary carcinoma 2.  Right breast lumpectomy November 4, 2020 papillary carcinoma in situ ER +100% CA +100% HER-2/susan 0 - sentinel lymph node biopsy and clear surgical margins.  4. Laboratory data:      ago    WBC   3.40 - 10.80 10*3/mm3 6.89    RBC   3.77 - 5.28 10*6/mm3 4.45    Hemoglobin   12.0 - 15.9 g/dL 13.0    Hematocrit   34.0 - 46.6 % 40.1    MCV   79.0 - 97.0 fL 90.1    MCH   26.6 - 33.0 pg 29.2    MCHC   31.5 - 35.7 g/dL 32.4    RDW   12.3 - 15.4 % 12.6    RDW-SD   37.0 - 54.0 fl 41.1    MPV   6.0 - 12.0 fL 10.2    Platelets   140 - 450 10*3/mm3 302          ASSESSMENT: The patient is a very pleasant 75 y.o.  female  with right breast cancer    PROBLEM LIST:   1.  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   Presented with bleeding discharge right nipple  B.  Diagnosed after ultrasound-guided biopsy October 23, 2020  C.  Status post lumpectomy done by Dr. Caceres November 4, 2020  D.  Final pathology revealed 1 cm papillary carcinoma in situ ER +100% MA positive percent HER-2/susan 0 by IHC negative sentinel lymph node biopsy and clear surgical margins.  2.  Hypothyroid  3.  Osteoarthritis    PLAN:   1. I had a long discussion today with the patient about her  new diagnosis of noninvasive right breast cancer. I did go over the final pathology report in  detail with both of them. I reviewed the patient's documents including refereing provider's notes, lab results, imaging, and path report.   2.  Given the patient age small tumor none invasive nature and clear surgical margins I do think patient will gain benefit from adjuvant radiation.  This is in compliance with NCCN guidelines.  3.  We discussed the role of adjuvant hormone treatment. We discussed her diagnosis of ductal carcinoma in situ of the breast.  This is considered a stage 0 malignancy.  Since she underwent a lumpectomy, we have recommended radiotherapy evaluation.  We discussed the concept of adjuvant therapy in the situation.  Her tumor expresses estrogen receptors.  Acceptable adjuvant therapy in this situation would be tamoxifen or an aromatase inhibitor such as Arimidex.  There is no benefit of hormone blockade in all cause mortality.  The benefit of postoperative hormone blockade is primarily in patients with a ER positive DCIS which lowers the risk of ipsilateral invasive carcinoma which in the studies did not reach statistical significance and a lower risk for contralateral invasive carcinoma.  In NSABP B-24, there was a absolute reduction of 3.4% of ipsilateral invasive breast cancer and a 2.6% reduction in contralateral invasive breast cancer.  This did not affect overall survival.         4.  After long discussion with the patient she is not interested  in hormone treatment at this point.  5.  We will continue active surveillance.  She is to have 6-month follow-up right breast mammogram.  She would like to keep getting those through her primary care provider.  6.  To follow-up with me in the future on as-needed basis.  7.  Discussed the case with Dr. Wang from pathology Dr. Curry from pathology and Dr. Hackett from general surgery to coordinate patient's care.  8.  We will continue Synthroid daily for hypothyroid.  Teto Hernandez MD  12/9/2020

## 2020-12-17 ENCOUNTER — OFFICE VISIT (OUTPATIENT)
Dept: OBSTETRICS AND GYNECOLOGY | Facility: CLINIC | Age: 75
End: 2020-12-17

## 2020-12-17 VITALS
WEIGHT: 159.2 LBS | SYSTOLIC BLOOD PRESSURE: 150 MMHG | DIASTOLIC BLOOD PRESSURE: 72 MMHG | BODY MASS INDEX: 28.21 KG/M2 | HEIGHT: 63 IN

## 2020-12-17 DIAGNOSIS — T83.9XXA PROBLEM WITH VAGINAL PESSARY, INITIAL ENCOUNTER (HCC): ICD-10-CM

## 2020-12-17 DIAGNOSIS — N95.2 VAGINAL ATROPHY: ICD-10-CM

## 2020-12-17 DIAGNOSIS — Z46.89 PESSARY MAINTENANCE: Primary | ICD-10-CM

## 2020-12-17 DIAGNOSIS — N81.11 CYSTOCELE, MIDLINE: ICD-10-CM

## 2020-12-17 PROCEDURE — 99213 OFFICE O/P EST LOW 20 MIN: CPT | Performed by: PHYSICIAN ASSISTANT

## 2020-12-17 RX ORDER — ESTRADIOL 0.1 MG/G
CREAM VAGINAL
Qty: 42.5 G | Refills: 1 | Status: SHIPPED | OUTPATIENT
Start: 2020-12-17

## 2020-12-17 NOTE — PROGRESS NOTES
Subjective   Chief Complaint   Patient presents with   • Pessary Check     Patient states that pessary with not stay in place       Divine Banuelos is a 75 y.o. year old  presenting to be seen for routine pessary maintenance  She reports that her current ring pessary with support has not come out completely but comes down in vagina especially when sitting on toilet.  She has been using current pessary for a few years.   She is voiding well, no trouble emptying bladder, no problems with bowel movements  She has not been using vaginal estrogen cream in the last several months.   Right breast cancer diagnosed in October with lumpectomy and no adjuvant therapy needed    Past Medical History:   Diagnosis Date   • Anxiety    • Arthritis    • Bell's palsy    • Bronchitis    • Cataract     bilateral   • Depression    • GERD (gastroesophageal reflux disease)    • History of exercise stress test     patient states it was normal   • Hyperlipidemia    • Hypertension     not currently taking medication for BP   • Hypothyroidism    • Malignant neoplasm of upper-outer quadrant of right breast in female, estrogen receptor positive (CMS/[facility]) 10/30/2020   • Mitral valve prolapse    • Osteoporosis    • Pessary maintenance    • Wears dentures     upper and lowers        Current Outpatient Medications:   •  aspirin 81 MG EC tablet, Take 81 mg by mouth daily., Disp: , Rfl:   •  Calcium Citrate (CITRACAL PO), Take 1 tablet by mouth Daily. + Magnesium , Disp: , Rfl:   •  levothyroxine (SYNTHROID, LEVOTHROID) 25 MCG tablet, Take 25 mcg by mouth daily., Disp: , Rfl:   •  Misc Natural Products (OSTEO BI-FLEX TRIPLE STRENGTH PO), Take 1 tablet by mouth 2 (Two) Times a Day., Disp: , Rfl:   •  Omega-3 Fatty Acids (FISH OIL) 1200 MG capsule capsule, Take 1,200 mg by mouth 2 (two) times a day., Disp: , Rfl:   •  PARoxetine (PAXIL) 10 MG tablet, Take 10 mg by mouth Every Morning., Disp: , Rfl:   •  estradiol (ESTRACE VAGINAL) 0.1 MG/GM  "vaginal cream, Massage pea size amount into vaginal opening twice weekly, Disp: 42.5 g, Rfl: 1   No Known Allergies   Past Surgical History:   Procedure Laterality Date   • BREAST LUMPECTOMY WITH SENTINEL NODE BIOPSY Right 11/4/2020    Procedure: BREAST LUMPECTOMY WITH SENTINEL NODE BIOPSY RIGHT;  Surgeon: Stuart Caceres MD;  Location: [facility];  Service: General;  Laterality: Right;   • CATARACT EXTRACTION Bilateral    • CATARACT EXTRACTION, BILATERAL     • COLONOSCOPY     • RETINAL DETACHMENT REPAIR Right       Social History     Socioeconomic History   • Marital status:      Spouse name: Not on file   • Number of children: Not on file   • Years of education: Not on file   • Highest education level: Not on file   Tobacco Use   • Smoking status: Never Smoker   • Smokeless tobacco: Never Used   Substance and Sexual Activity   • Alcohol use: No   • Drug use: No   • Sexual activity: Defer     Birth control/protection: Post-menopausal   Social History Narrative    Right hand dominant      Family History   Problem Relation Age of Onset   • Hypertension Mother    • Colon cancer Father    • Colon cancer Paternal Grandmother    • Colon cancer Paternal Uncle    • Breast cancer Maternal Aunt        Review of Systems   Constitutional: Negative for chills, diaphoresis and fever.   Gastrointestinal: Negative for abdominal pain, constipation, diarrhea, nausea and vomiting.   Genitourinary: Negative for difficulty urinating, dysuria, pelvic pain, vaginal bleeding, vaginal discharge and vaginal pain.           Objective   /72   Ht 160 cm (63\")   Wt 72.2 kg (159 lb 3.2 oz)   LMP  (LMP Unknown)   Breastfeeding No   BMI 28.20 kg/m²     Physical Exam  Constitutional:       Appearance: Normal appearance. She is well-developed and well-groomed.   Abdominal:      Palpations: Abdomen is soft. There is no mass.      Tenderness: There is no abdominal tenderness. There is no guarding.   Genitourinary:     Labia:         " Right: No rash, tenderness or lesion.         Left: No rash, tenderness or lesion.       Urethra: No prolapse, urethral pain, urethral swelling or urethral lesion.      Comments: Size 3 ring with support pessary removed. No vaginal wall erosion or abrasion but vaginal tissue atrophic  size 4 dish pessary reinserted and comfortable for patient  Grade 2 cystocele  Musculoskeletal: Normal range of motion.   Skin:     General: Skin is warm and dry.      Findings: No lesion or rash.   Neurological:      Mental Status: She is alert and oriented to person, place, and time.   Psychiatric:         Attention and Perception: Attention normal.         Mood and Affect: Mood normal.         Speech: Speech normal.         Behavior: Behavior is cooperative.         Thought Content: Thought content normal.              Assessment and Plan  Diagnoses and all orders for this visit:    1. Pessary maintenance (Primary)    2. Cystocele, midline    3. Problem with vaginal pessary, initial encounter (CMS/[facility])    4. Vaginal atrophy    Other orders  -     estradiol (ESTRACE VAGINAL) 0.1 MG/GM vaginal cream; Massage pea size amount into vaginal opening twice weekly  Dispense: 42.5 g; Refill: 1      Patient Instructions   Will resume vaginal estrogen cream and patient to massage pea size amount vaginally twice weekly             This note was electronically signed.    Denise Hernandez PA-C   December 17, 2020

## 2021-02-25 ENCOUNTER — OFFICE VISIT (OUTPATIENT)
Dept: OBSTETRICS AND GYNECOLOGY | Facility: CLINIC | Age: 76
End: 2021-02-25

## 2021-02-25 VITALS
SYSTOLIC BLOOD PRESSURE: 128 MMHG | DIASTOLIC BLOOD PRESSURE: 66 MMHG | HEIGHT: 63 IN | BODY MASS INDEX: 28.39 KG/M2 | WEIGHT: 160.2 LBS

## 2021-02-25 DIAGNOSIS — T83.9XXA PROBLEM WITH VAGINAL PESSARY, INITIAL ENCOUNTER (HCC): ICD-10-CM

## 2021-02-25 DIAGNOSIS — Z46.89 PESSARY MAINTENANCE: Primary | ICD-10-CM

## 2021-02-25 DIAGNOSIS — N81.11 CYSTOCELE, MIDLINE: ICD-10-CM

## 2021-02-25 PROCEDURE — 99212 OFFICE O/P EST SF 10 MIN: CPT | Performed by: PHYSICIAN ASSISTANT

## 2021-02-25 NOTE — PATIENT INSTRUCTIONS
Leave pessary out for 2 weeks  Patient advised to insert her estradiol cream twice weekly while pessary out  RTO 2 weeks and will reinsert pessary if abrasion healed

## 2021-02-25 NOTE — PROGRESS NOTES
Subjective   Chief Complaint   Patient presents with   • Pessary Check     Patient states she has been seeing drainage since last visit       Divine Banuelos is a 75 y.o. year old  presenting to be seen for routine pessary visit  She reports seeing some yellow discharge since her last visit and a spot of blood on one occasion  No vaginal or pelvic pain. No problems voiding. No issues with bowel movements    Past Medical History:   Diagnosis Date   • Anxiety    • Arthritis    • Bell's palsy    • Bronchitis    • Cataract     bilateral   • Depression    • GERD (gastroesophageal reflux disease)    • History of exercise stress test     patient states it was normal   • Hyperlipidemia    • Hypertension     not currently taking medication for BP   • Hypothyroidism    • Malignant neoplasm of upper-outer quadrant of right breast in female, estrogen receptor positive (CMS/[facility]) 10/30/2020   • Mitral valve prolapse    • Osteoporosis    • Pessary maintenance    • Wears dentures     upper and lowers        Current Outpatient Medications:   •  aspirin 81 MG EC tablet, Take 81 mg by mouth daily., Disp: , Rfl:   •  Calcium Citrate (CITRACAL PO), Take 1 tablet by mouth Daily. + Magnesium , Disp: , Rfl:   •  levothyroxine (SYNTHROID, LEVOTHROID) 25 MCG tablet, Take 25 mcg by mouth daily., Disp: , Rfl:   •  Misc Natural Products (OSTEO BI-FLEX TRIPLE STRENGTH PO), Take 1 tablet by mouth 2 (Two) Times a Day., Disp: , Rfl:   •  Omega-3 Fatty Acids (FISH OIL) 1200 MG capsule capsule, Take 1,200 mg by mouth 2 (two) times a day., Disp: , Rfl:   •  PARoxetine (PAXIL) 10 MG tablet, Take 10 mg by mouth Every Morning., Disp: , Rfl:   •  estradiol (ESTRACE VAGINAL) 0.1 MG/GM vaginal cream, Massage pea size amount into vaginal opening twice weekly, Disp: 42.5 g, Rfl: 1   No Known Allergies   Past Surgical History:   Procedure Laterality Date   • BREAST LUMPECTOMY WITH SENTINEL NODE BIOPSY Right 2020    Procedure: BREAST LUMPECTOMY  "WITH SENTINEL NODE BIOPSY RIGHT;  Surgeon: Stuart Caceres MD;  Location: [facility];  Service: General;  Laterality: Right;   • CATARACT EXTRACTION Bilateral    • CATARACT EXTRACTION, BILATERAL     • COLONOSCOPY     • RETINAL DETACHMENT REPAIR Right       Social History     Socioeconomic History   • Marital status:      Spouse name: Not on file   • Number of children: Not on file   • Years of education: Not on file   • Highest education level: Not on file   Tobacco Use   • Smoking status: Never Smoker   • Smokeless tobacco: Never Used   Substance and Sexual Activity   • Alcohol use: No   • Drug use: No   • Sexual activity: Defer     Birth control/protection: Post-menopausal   Social History Narrative    Right hand dominant      Family History   Problem Relation Age of Onset   • Hypertension Mother    • Colon cancer Father    • Colon cancer Paternal Grandmother    • Colon cancer Paternal Uncle    • Breast cancer Maternal Aunt        Review of Systems   Constitutional: Negative for chills, diaphoresis and fever.   Gastrointestinal: Negative.    Genitourinary: Positive for vaginal discharge. Negative for difficulty urinating, dysuria, pelvic pain and vaginal pain.           Objective   /66   Ht 160 cm (63\")   Wt 72.7 kg (160 lb 3.2 oz)   LMP  (LMP Unknown)   Breastfeeding No   BMI 28.38 kg/m²     Physical Exam  Constitutional:       Appearance: Normal appearance. She is well-developed and well-groomed.   Eyes:      General: Lids are normal.      Extraocular Movements: Extraocular movements intact.      Conjunctiva/sclera: Conjunctivae normal.   Genitourinary:     Labia:         Right: No rash, tenderness or lesion.         Left: No rash, tenderness or lesion.       Urethra: No prolapse, urethral pain, urethral swelling or urethral lesion.      Vagina: Signs of injury present. Tenderness present.      Comments: Ring with support pessary removed  Small abrasion right distal vaginal wall is " bleeding  Vaginal tissue atrophic  1 gram premarin cream from sample inserted   Pessary left out   Grade 2 cystocele  Musculoskeletal: Normal range of motion.   Skin:     General: Skin is warm and dry.      Findings: No lesion or rash.   Neurological:      General: No focal deficit present.      Mental Status: She is alert and oriented to person, place, and time.   Psychiatric:         Attention and Perception: Attention normal.         Mood and Affect: Mood normal.         Speech: Speech normal.         Behavior: Behavior is cooperative.         Thought Content: Thought content normal.              Assessment and Plan  Diagnoses and all orders for this visit:    1. Pessary maintenance (Primary)    2. Problem with vaginal pessary, initial encounter (CMS/[facility])    3. Cystocele, midline      Patient Instructions   Leave pessary out for 2 weeks  Patient advised to insert her estradiol cream twice weekly while pessary out  RTO 2 weeks and will reinsert pessary if abrasion healed             This note was electronically signed.    Denise Hernandez PA-C   February 25, 2021

## 2021-03-11 ENCOUNTER — OFFICE VISIT (OUTPATIENT)
Dept: OBSTETRICS AND GYNECOLOGY | Facility: CLINIC | Age: 76
End: 2021-03-11

## 2021-03-11 VITALS
SYSTOLIC BLOOD PRESSURE: 138 MMHG | BODY MASS INDEX: 28.24 KG/M2 | HEIGHT: 63 IN | WEIGHT: 159.4 LBS | DIASTOLIC BLOOD PRESSURE: 68 MMHG

## 2021-03-11 DIAGNOSIS — Z46.89 HISTORY OF REMOVAL OF PESSARY: ICD-10-CM

## 2021-03-11 DIAGNOSIS — T83.9XXD PROBLEM WITH VAGINAL PESSARY, SUBSEQUENT ENCOUNTER: Primary | ICD-10-CM

## 2021-03-11 DIAGNOSIS — N81.11 CYSTOCELE, MIDLINE: ICD-10-CM

## 2021-03-11 PROCEDURE — 99213 OFFICE O/P EST LOW 20 MIN: CPT | Performed by: PHYSICIAN ASSISTANT

## 2021-03-11 NOTE — PROGRESS NOTES
Subjective   Chief Complaint   Patient presents with   • Follow-up      pessary reinsertion       Divine Banuelos is a 75 y.o. year old  presenting to be seen for follow up and reinsertion of pessary. Patient has worn pessary for several years but at last routine check 2 weeks ago had small right distal vaginal wall abrasion and pessary left out.   She has done pretty well since pessary has been out but has had some urine leakage. Desires to have pessary reinserted today      Past Medical History:   Diagnosis Date   • Anxiety    • Arthritis    • Bell's palsy    • Bronchitis    • Cataract     bilateral   • Depression    • GERD (gastroesophageal reflux disease)    • History of exercise stress test 2019    patient states it was normal   • Hyperlipidemia    • Hypertension     not currently taking medication for BP   • Hypothyroidism    • Malignant neoplasm of upper-outer quadrant of right breast in female, estrogen receptor positive (CMS/HCC) 10/30/2020   • Mitral valve prolapse    • Osteoporosis    • Pessary maintenance    • Wears dentures     upper and lowers        Current Outpatient Medications:   •  aspirin 81 MG EC tablet, Take 81 mg by mouth daily., Disp: , Rfl:   •  Calcium Citrate (CITRACAL PO), Take 1 tablet by mouth Daily. + Magnesium , Disp: , Rfl:   •  estradiol (ESTRACE VAGINAL) 0.1 MG/GM vaginal cream, Massage pea size amount into vaginal opening twice weekly, Disp: 42.5 g, Rfl: 1  •  levothyroxine (SYNTHROID, LEVOTHROID) 25 MCG tablet, Take 25 mcg by mouth daily., Disp: , Rfl:   •  Misc Natural Products (OSTEO BI-FLEX TRIPLE STRENGTH PO), Take 1 tablet by mouth 2 (Two) Times a Day., Disp: , Rfl:   •  Omega-3 Fatty Acids (FISH OIL) 1200 MG capsule capsule, Take 1,200 mg by mouth 2 (two) times a day., Disp: , Rfl:   •  PARoxetine (PAXIL) 10 MG tablet, Take 10 mg by mouth Every Morning., Disp: , Rfl:    No Known Allergies   Past Surgical History:   Procedure Laterality Date   • BREAST LUMPECTOMY WITH  "SENTINEL NODE BIOPSY Right 11/4/2020    Procedure: BREAST LUMPECTOMY WITH SENTINEL NODE BIOPSY RIGHT;  Surgeon: Stuart Caceres MD;  Location: [facility];  Service: General;  Laterality: Right;   • CATARACT EXTRACTION Bilateral    • CATARACT EXTRACTION, BILATERAL     • COLONOSCOPY     • RETINAL DETACHMENT REPAIR Right       Social History     Socioeconomic History   • Marital status:      Spouse name: Not on file   • Number of children: Not on file   • Years of education: Not on file   • Highest education level: Not on file   Tobacco Use   • Smoking status: Never Smoker   • Smokeless tobacco: Never Used   Vaping Use   • Vaping Use: Never used   Substance and Sexual Activity   • Alcohol use: No   • Drug use: No   • Sexual activity: Defer     Birth control/protection: Post-menopausal      Family History   Problem Relation Age of Onset   • Hypertension Mother    • Colon cancer Father    • Colon cancer Paternal Grandmother    • Colon cancer Paternal Uncle    • Breast cancer Maternal Aunt        Review of Systems   Constitutional: Negative for chills, diaphoresis and fever.   Gastrointestinal: Negative.    Genitourinary: Positive for enuresis. Negative for difficulty urinating, dysuria, pelvic pain, vaginal bleeding and vaginal discharge.           Objective   /68   Ht 160 cm (63\")   Wt 72.3 kg (159 lb 6.4 oz)   LMP  (LMP Unknown)   Breastfeeding No   BMI 28.24 kg/m²     Physical Exam  Constitutional:       Appearance: Normal appearance. She is well-developed and well-groomed.   Eyes:      General: Lids are normal.      Extraocular Movements: Extraocular movements intact.      Conjunctiva/sclera: Conjunctivae normal.   Genitourinary:     Labia:         Right: No rash, tenderness or lesion.         Left: No rash, tenderness or lesion.       Urethra: No prolapse, urethral pain, urethral swelling or urethral lesion.      Comments: Vaginal wall abrasion healed  Grade 2 cystocele  Dish pessary " reinserted  Neurological:      General: No focal deficit present.      Mental Status: She is alert and oriented to person, place, and time.   Psychiatric:         Attention and Perception: Attention normal.         Mood and Affect: Mood normal.         Speech: Speech normal.         Behavior: Behavior is cooperative.         Thought Content: Thought content normal.              Assessment and Plan  Diagnoses and all orders for this visit:    1. Problem with vaginal pessary, subsequent encounter (Primary)    2. History of removal of pessary    3. Cystocele, midline      Patient Instructions   Follow up 2 months or prn             This note was electronically signed.    Denise Hernandez PA-C   March 11, 2021

## 2021-04-07 ENCOUNTER — HOSPITAL ENCOUNTER (OUTPATIENT)
Facility: HOSPITAL | Age: 76
Discharge: HOME | End: 2021-04-07
Payer: MEDICARE

## 2021-04-07 LAB
A/G RATIO: 1.4 (ref 0.8–2)
ALBUMIN SERPL-MCNC: 4.1 G/DL (ref 3.4–4.8)
ALP BLD-CCNC: 113 U/L (ref 25–100)
ALT SERPL-CCNC: 14 U/L (ref 4–36)
ANION GAP SERPL CALCULATED.3IONS-SCNC: 12 MMOL/L (ref 3–16)
AST SERPL-CCNC: 19 U/L (ref 8–33)
BASOPHILS ABSOLUTE: 0 K/UL (ref 0–0.1)
BASOPHILS RELATIVE PERCENT: 0.6 %
BILIRUB SERPL-MCNC: 0.4 MG/DL (ref 0.3–1.2)
BUN BLDV-MCNC: 18 MG/DL (ref 6–20)
CALCIUM SERPL-MCNC: 9.7 MG/DL (ref 8.5–10.5)
CHLORIDE BLD-SCNC: 102 MMOL/L (ref 98–107)
CHOLESTEROL, TOTAL: 231 MG/DL (ref 0–200)
CO2: 26 MMOL/L (ref 20–30)
CREAT SERPL-MCNC: 0.9 MG/DL (ref 0.4–1.2)
EOSINOPHILS ABSOLUTE: 0.3 K/UL (ref 0–0.4)
EOSINOPHILS RELATIVE PERCENT: 3.9 %
FOLATE: 17.34 NG/ML
GFR AFRICAN AMERICAN: >59
GFR NON-AFRICAN AMERICAN: >60
GLOBULIN: 2.9 G/DL
GLUCOSE BLD-MCNC: 100 MG/DL (ref 74–106)
HBA1C MFR BLD: 5.4 %
HCT VFR BLD CALC: 44.1 % (ref 37–47)
HDLC SERPL-MCNC: 69 MG/DL (ref 40–60)
HEMOGLOBIN: 13.5 G/DL (ref 11.5–16.5)
IMMATURE GRANULOCYTES #: 0 K/UL
IMMATURE GRANULOCYTES %: 0.3 % (ref 0–5)
LDL CHOLESTEROL CALCULATED: 145 MG/DL
LYMPHOCYTES ABSOLUTE: 1.6 K/UL (ref 1.5–4)
LYMPHOCYTES RELATIVE PERCENT: 23.4 %
MCH RBC QN AUTO: 28.3 PG (ref 27–32)
MCHC RBC AUTO-ENTMCNC: 30.6 G/DL (ref 31–35)
MCV RBC AUTO: 92.5 FL (ref 80–100)
MONOCYTES ABSOLUTE: 0.5 K/UL (ref 0.2–0.8)
MONOCYTES RELATIVE PERCENT: 7.7 %
NEUTROPHILS ABSOLUTE: 4.4 K/UL (ref 2–7.5)
NEUTROPHILS RELATIVE PERCENT: 64.1 %
PDW BLD-RTO: 13.6 % (ref 11–16)
PLATELET # BLD: 329 K/UL (ref 150–400)
PMV BLD AUTO: 10.2 FL (ref 6–10)
POTASSIUM SERPL-SCNC: 4.4 MMOL/L (ref 3.4–5.1)
RBC # BLD: 4.77 M/UL (ref 3.8–5.8)
SODIUM BLD-SCNC: 140 MMOL/L (ref 136–145)
TOTAL PROTEIN: 7 G/DL (ref 6.4–8.3)
TRIGL SERPL-MCNC: 86 MG/DL (ref 0–249)
TSH SERPL DL<=0.05 MIU/L-ACNC: 5.16 UIU/ML (ref 0.27–4.2)
VITAMIN B-12: 458 PG/ML (ref 211–911)
VITAMIN D 25-HYDROXY: 44.5 (ref 32–100)
VLDLC SERPL CALC-MCNC: 17 MG/DL
WBC # BLD: 6.9 K/UL (ref 4–11)

## 2021-04-07 PROCEDURE — 82746 ASSAY OF FOLIC ACID SERUM: CPT

## 2021-04-07 PROCEDURE — 84443 ASSAY THYROID STIM HORMONE: CPT

## 2021-04-07 PROCEDURE — 80053 COMPREHEN METABOLIC PANEL: CPT

## 2021-04-07 PROCEDURE — 85025 COMPLETE CBC W/AUTO DIFF WBC: CPT

## 2021-04-07 PROCEDURE — 82607 VITAMIN B-12: CPT

## 2021-04-07 PROCEDURE — 80061 LIPID PANEL: CPT

## 2021-04-07 PROCEDURE — 36415 COLL VENOUS BLD VENIPUNCTURE: CPT

## 2021-04-07 PROCEDURE — 82306 VITAMIN D 25 HYDROXY: CPT

## 2021-04-07 PROCEDURE — 83036 HEMOGLOBIN GLYCOSYLATED A1C: CPT

## 2021-04-09 ENCOUNTER — HOSPITAL ENCOUNTER (OUTPATIENT)
Dept: ULTRASOUND IMAGING | Facility: HOSPITAL | Age: 76
Discharge: HOME | End: 2021-04-09
Payer: MEDICARE

## 2021-04-09 DIAGNOSIS — E04.1 NONTOXIC THYROID NODULE: ICD-10-CM

## 2021-04-09 DIAGNOSIS — E03.9 HYPOTHYROIDISM IN ADULT: ICD-10-CM

## 2021-04-09 PROCEDURE — 76536 US EXAM OF HEAD AND NECK: CPT

## 2021-05-10 ENCOUNTER — TELEPHONE (OUTPATIENT)
Dept: SURGERY | Facility: CLINIC | Age: 76
End: 2021-05-10

## 2021-05-10 NOTE — TELEPHONE ENCOUNTER
Called patient to reschedule her appointment this Friday due to u/s Tech being out. I had to leave voice message with new appointment 05/21/21 @ 1:20pm.I asks that patient call the office and confirm new appointment.

## 2021-05-11 ENCOUNTER — OFFICE VISIT (OUTPATIENT)
Dept: OBSTETRICS AND GYNECOLOGY | Facility: CLINIC | Age: 76
End: 2021-05-11

## 2021-05-11 VITALS
SYSTOLIC BLOOD PRESSURE: 140 MMHG | WEIGHT: 157 LBS | HEIGHT: 63 IN | BODY MASS INDEX: 27.82 KG/M2 | DIASTOLIC BLOOD PRESSURE: 74 MMHG

## 2021-05-11 DIAGNOSIS — N81.11 CYSTOCELE, MIDLINE: ICD-10-CM

## 2021-05-11 DIAGNOSIS — Z46.89 PESSARY MAINTENANCE: Primary | ICD-10-CM

## 2021-05-11 PROCEDURE — 99212 OFFICE O/P EST SF 10 MIN: CPT | Performed by: PHYSICIAN ASSISTANT

## 2021-05-11 NOTE — PROGRESS NOTES
Subjective   Chief Complaint   Patient presents with   • Pessary Check     doing well       Divine Banuelos is a 75 y.o. year old  presenting to be seen for routine pessary visit  She is doing well. No problems voiding. No issues with bowel movements.  No incontinence  No vaginal bleeding or discharge  Is using estrogen cream 1-2 times weekly        Past Medical History:   Diagnosis Date   • Anxiety    • Arthritis    • Bell's palsy    • Bronchitis    • Cataract     bilateral   • Depression    • GERD (gastroesophageal reflux disease)    • History of exercise stress test     patient states it was normal   • Hyperlipidemia    • Hypertension     not currently taking medication for BP   • Hypothyroidism    • Malignant neoplasm of upper-outer quadrant of right breast in female, estrogen receptor positive (CMS/HCC) 10/30/2020   • Mitral valve prolapse    • Osteoporosis    • Pessary maintenance    • Wears dentures     upper and lowers        Current Outpatient Medications:   •  aspirin 81 MG EC tablet, Take 81 mg by mouth daily., Disp: , Rfl:   •  Calcium Citrate (CITRACAL PO), Take 1 tablet by mouth Daily. + Magnesium , Disp: , Rfl:   •  estradiol (ESTRACE VAGINAL) 0.1 MG/GM vaginal cream, Massage pea size amount into vaginal opening twice weekly, Disp: 42.5 g, Rfl: 1  •  levothyroxine (SYNTHROID, LEVOTHROID) 25 MCG tablet, Take 25 mcg by mouth daily., Disp: , Rfl:   •  Misc Natural Products (OSTEO BI-FLEX TRIPLE STRENGTH PO), Take 1 tablet by mouth 2 (Two) Times a Day., Disp: , Rfl:   •  Omega-3 Fatty Acids (FISH OIL) 1200 MG capsule capsule, Take 1,200 mg by mouth 2 (two) times a day., Disp: , Rfl:   •  PARoxetine (PAXIL) 10 MG tablet, Take 10 mg by mouth Every Morning., Disp: , Rfl:    No Known Allergies   Past Surgical History:   Procedure Laterality Date   • BREAST LUMPECTOMY WITH SENTINEL NODE BIOPSY Right 2020    Procedure: BREAST LUMPECTOMY WITH SENTINEL NODE BIOPSY RIGHT;  Surgeon: Stuart Caceres,  "MD;  Location: [facility];  Service: General;  Laterality: Right;   • CATARACT EXTRACTION Bilateral    • CATARACT EXTRACTION, BILATERAL     • COLONOSCOPY     • RETINAL DETACHMENT REPAIR Right       Social History     Socioeconomic History   • Marital status:      Spouse name: Not on file   • Number of children: Not on file   • Years of education: Not on file   • Highest education level: Not on file   Tobacco Use   • Smoking status: Never Smoker   • Smokeless tobacco: Never Used   Vaping Use   • Vaping Use: Never used   Substance and Sexual Activity   • Alcohol use: No   • Drug use: No   • Sexual activity: Defer     Birth control/protection: Post-menopausal      Family History   Problem Relation Age of Onset   • Hypertension Mother    • Colon cancer Father    • Colon cancer Paternal Grandmother    • Colon cancer Paternal Uncle    • Breast cancer Maternal Aunt        Review of Systems   Constitutional: Negative for chills, diaphoresis and fever.   Gastrointestinal: Negative.    Genitourinary: Negative for difficulty urinating, dysuria, enuresis, pelvic pain, urgency, vaginal bleeding and vaginal discharge.           Objective   /74   Ht 160 cm (63\")   Wt 71.2 kg (157 lb)   LMP  (LMP Unknown)   Breastfeeding No   BMI 27.81 kg/m²     Physical Exam  Constitutional:       Appearance: Normal appearance. She is well-developed and well-groomed.   Eyes:      General: Lids are normal.      Extraocular Movements: Extraocular movements intact.      Conjunctiva/sclera: Conjunctivae normal.   Genitourinary:     Labia:         Right: No rash, tenderness or lesion.         Left: No rash, tenderness or lesion.       Urethra: No prolapse, urethral pain, urethral swelling or urethral lesion.      Comments: Dish pessary removed, cleaned and reinserted   No vaginal wall erosion or abrasions  Grade 2 cystocele  Neurological:      General: No focal deficit present.      Mental Status: She is alert and oriented to person, " place, and time.   Psychiatric:         Attention and Perception: Attention normal.         Mood and Affect: Mood normal.         Speech: Speech normal.         Behavior: Behavior is cooperative.            Result Review :                   Assessment and Plan  Diagnoses and all orders for this visit:    1. Pessary maintenance (Primary)    2. Cystocele, midline      Patient Instructions   Follow up 3 months or prn             This note was electronically signed.    Denise Hernandez PA-C   May 11, 2021

## 2021-05-18 NOTE — PROGRESS NOTES
Patient: Divine Banuelos    YOB: 1945    Date: 05/21/2021    Primary Care Provider: Queenie Amezcua APRN    Chief Complaint   Patient presents with   • Follow-up     right breast       Subjective .     History of present illness:  I saw the patient in the office today as a followup from their right breast lumpectomy.  They state that they have done well and are having no complaints. Pathology report was reviewed and did show carcinoma with no invasion.  Dawn lymph node negative.  Ultrasound today indicates a normal-appearing lumpectomy scar with no new lesions or seroma formation.    The following portions of the patient's history were reviewed and updated as appropriate: allergies, current medications, past family history, past medical history, past social history, past surgical history and problem list.    Review of Systems   Constitutional: Negative for chills, fever and unexpected weight change.   HENT: Negative for hearing loss, trouble swallowing and voice change.    Eyes: Negative for visual disturbance.   Respiratory: Negative for apnea, cough, chest tightness, shortness of breath and wheezing.    Cardiovascular: Negative for chest pain, palpitations and leg swelling.   Gastrointestinal: Negative for abdominal distention, abdominal pain, anal bleeding, blood in stool, constipation, diarrhea, nausea, rectal pain and vomiting.   Endocrine: Negative for cold intolerance and heat intolerance.   Genitourinary: Negative for difficulty urinating, dysuria and flank pain.   Musculoskeletal: Negative for back pain and gait problem.   Skin: Negative for color change, rash and wound.   Neurological: Negative for dizziness, syncope, speech difficulty, weakness, light-headedness, numbness and headaches.   Hematological: Negative for adenopathy. Does not bruise/bleed easily.   Psychiatric/Behavioral: Negative for confusion. The patient is not nervous/anxious.        History:  Past Medical History:    Diagnosis Date   • Anxiety    • Arthritis    • Bell's palsy    • Bronchitis    • Cataract     bilateral   • Depression    • GERD (gastroesophageal reflux disease)    • History of exercise stress test 2019    patient states it was normal   • Hyperlipidemia    • Hypertension     not currently taking medication for BP   • Hypothyroidism    • Malignant neoplasm of upper-outer quadrant of right breast in female, estrogen receptor positive (CMS/HCC) 10/30/2020   • Mitral valve prolapse    • Osteoporosis    • Pessary maintenance    • Wears dentures     upper and lowers          Past Surgical History:   Procedure Laterality Date   • BREAST LUMPECTOMY WITH SENTINEL NODE BIOPSY Right 11/4/2020    Procedure: BREAST LUMPECTOMY WITH SENTINEL NODE BIOPSY RIGHT;  Surgeon: Stuart Caceres MD;  Location: [facility];  Service: General;  Laterality: Right;   • CATARACT EXTRACTION Bilateral    • CATARACT EXTRACTION, BILATERAL     • COLONOSCOPY     • RETINAL DETACHMENT REPAIR Right        Family History   Problem Relation Age of Onset   • Hypertension Mother    • Colon cancer Father    • Colon cancer Paternal Grandmother    • Colon cancer Paternal Uncle    • Breast cancer Maternal Aunt        Social History     Tobacco Use   • Smoking status: Never Smoker   • Smokeless tobacco: Never Used   Vaping Use   • Vaping Use: Never used   Substance Use Topics   • Alcohol use: No   • Drug use: No       Allergies:  No Known Allergies    Medications:     Current Outpatient Medications:   •  aspirin 81 MG EC tablet, Take 81 mg by mouth daily., Disp: , Rfl:   •  Calcium Citrate (CITRACAL PO), Take 1 tablet by mouth Daily. + Magnesium , Disp: , Rfl:   •  estradiol (ESTRACE VAGINAL) 0.1 MG/GM vaginal cream, Massage pea size amount into vaginal opening twice weekly, Disp: 42.5 g, Rfl: 1  •  levothyroxine (SYNTHROID, LEVOTHROID) 25 MCG tablet, Take 25 mcg by mouth daily., Disp: , Rfl:   •  Misc Natural Products (OSTEO BI-FLEX TRIPLE STRENGTH PO), Take  "1 tablet by mouth 2 (Two) Times a Day., Disp: , Rfl:   •  Omega-3 Fatty Acids (FISH OIL) 1200 MG capsule capsule, Take 1,200 mg by mouth 2 (two) times a day., Disp: , Rfl:   •  PARoxetine (PAXIL) 10 MG tablet, Take 10 mg by mouth Every Morning., Disp: , Rfl:     Objective     Vital Signs:   Vitals:    05/21/21 1255   BP: 126/74   Pulse: 59   Temp: 97.5 °F (36.4 °C)   SpO2: 98%   Weight: 71.6 kg (157 lb 12.8 oz)   Height: 160 cm (63\")       Physical Exam:     General Appearance:    Alert, cooperative, in no acute distress   Head:    Normocephalic, without obvious abnormality, atraumatic   Eyes:            Lids and lashes normal, conjunctivae and sclerae normal, no   icterus, no pallor, corneas clear,   Ears:    Ears appear intact with no abnormalities noted   Throat:   No oral lesions, no thrush, oral mucosa moist   Breast:    No palpable mass in either breast or axilla.   Lungs:     Clear to auscultation,respirations regular, even and                  Unlabored    Heart:    Regular rhythm and normal rate, no murmur, no gallop.   Chest Wall:    No abnormalities observed   Abdomen:     Normal bowel sounds, no masses, no organomegaly, soft        non-tender, non-distended, no guarding.   Extremities:   Moves all extremities well, no edema, no cyanosis, no             redness   Pulses:   Pulses palpable and equal bilaterally   Skin:   No bleeding, bruising or rash   Lymph nodes:   No palpable adenopathy   Neurologic:   Cranial nerves 2 - 12 grossly intact.           Results Review:   I reviewed the patient's new clinical results.    Review of Systems was reviewed and confirmed as accurate as documented by the MA.    Assessment / Plan:    1. History of breast cancer        I did have a detailed and extensive discussion with the patient in the office today and reviewed her recent workup.  I have told the patient that she is doing well, baseline ultrasound appears normal.  Recommend follow-up in 1 year for repeat " ultrasound.  Also continue yearly mammograms.    Electronically signed by Stuart Caceres MD  05/21/21  14:09 EDT

## 2021-05-21 ENCOUNTER — OFFICE VISIT (OUTPATIENT)
Dept: SURGERY | Facility: CLINIC | Age: 76
End: 2021-05-21

## 2021-05-21 VITALS
HEIGHT: 63 IN | OXYGEN SATURATION: 98 % | WEIGHT: 157.8 LBS | HEART RATE: 59 BPM | BODY MASS INDEX: 27.96 KG/M2 | SYSTOLIC BLOOD PRESSURE: 126 MMHG | DIASTOLIC BLOOD PRESSURE: 74 MMHG | TEMPERATURE: 97.5 F

## 2021-05-21 DIAGNOSIS — Z85.3 HISTORY OF BREAST CANCER: Primary | ICD-10-CM

## 2021-05-21 PROCEDURE — 99212 OFFICE O/P EST SF 10 MIN: CPT | Performed by: SURGERY

## 2021-06-25 ENCOUNTER — HOSPITAL ENCOUNTER (OUTPATIENT)
Dept: CT IMAGING | Facility: HOSPITAL | Age: 76
Discharge: HOME | End: 2021-06-25
Payer: MEDICARE

## 2021-06-25 DIAGNOSIS — J32.9 CHRONIC SINUSITIS, UNSPECIFIED LOCATION: ICD-10-CM

## 2021-06-25 PROCEDURE — 70486 CT MAXILLOFACIAL W/O DYE: CPT

## 2021-07-19 ENCOUNTER — LAB (OUTPATIENT)
Dept: LAB | Facility: HOSPITAL | Age: 76
End: 2021-07-19

## 2021-07-19 ENCOUNTER — OFFICE VISIT (OUTPATIENT)
Dept: ENDOCRINOLOGY | Facility: CLINIC | Age: 76
End: 2021-07-19

## 2021-07-19 VITALS
OXYGEN SATURATION: 97 % | HEIGHT: 63 IN | BODY MASS INDEX: 27.64 KG/M2 | WEIGHT: 156 LBS | HEART RATE: 60 BPM | DIASTOLIC BLOOD PRESSURE: 72 MMHG | SYSTOLIC BLOOD PRESSURE: 122 MMHG

## 2021-07-19 DIAGNOSIS — E04.2 MULTIPLE THYROID NODULES: Primary | ICD-10-CM

## 2021-07-19 DIAGNOSIS — E03.8 HYPOTHYROIDISM DUE TO HASHIMOTO'S THYROIDITIS: ICD-10-CM

## 2021-07-19 DIAGNOSIS — E06.3 HYPOTHYROIDISM DUE TO HASHIMOTO'S THYROIDITIS: ICD-10-CM

## 2021-07-19 LAB — TSH SERPL DL<=0.05 MIU/L-ACNC: 2.91 UIU/ML (ref 0.27–4.2)

## 2021-07-19 PROCEDURE — 84443 ASSAY THYROID STIM HORMONE: CPT | Performed by: INTERNAL MEDICINE

## 2021-07-19 PROCEDURE — 76536 US EXAM OF HEAD AND NECK: CPT | Performed by: INTERNAL MEDICINE

## 2021-07-19 PROCEDURE — 99204 OFFICE O/P NEW MOD 45 MIN: CPT | Performed by: INTERNAL MEDICINE

## 2021-07-19 RX ORDER — LEVOTHYROXINE SODIUM 0.05 MG/1
50 TABLET ORAL DAILY
COMMUNITY

## 2021-07-19 NOTE — PROGRESS NOTES
Thyroid Ultrasound Report  [facility]  [place], [place]    Date: 07/19/2021  Indication: thyroid nodules  Comparison Imaging: none   Clinical History: 75 y.o. female with h/o hypothyroidism since 2014. Outside US report describing thyroid nodules. Pt with a small, firm palpable mass in the left upper neck.    Real time high resolution imaging of the thyroid gland was performed in transverse and longitudinal planes.  The right lobe measured 4.10 cm in Length x 1.49 cm in AP diameter x 1.68 cm in TV dimension.  The isthmus measured 0.23 cm in thickness.  The left thyroid lobe measured 3.59 cm in length x 1.41 cm in AP diameter x 1.36 cm in TV dimension.    The thyroid gland appeared overall isoechoic with heterogeneous echotexture.    In the right superior lobe, a 0.64(L) x 0.52(AP) x 0.57(T) cm slightly hypoechoic, solid nodule was identified. The nodule had a smooth margin, no vascularity, and no microcalcifications.     In the right mid lobe, a 0.93(L) x 0.76(AP) x 0.90(T) cm isoechoic, solid nodule was identified. The nodule had a smooth margin, peripheral and intranodal vascularity, and no microcalcifications.     In the left mid lobe, a 1.08(L) x 0.61(AP) x 0.85(T) cm slightly hypoechoic, solid nodule was identified. The nodule had a smooth margin, no vascularity, and no microcalcifications.     There were no lesions or abnormal lymph nodes in the left upper neck in the area of the palpable mass on exam. A small blood vessel with a calcified wall was identified in the area of the palpable mass.    No pathologic lymph nodes were seen.     IMPRESSION:   1) The thyroid gland was normal in size by measured dimensions.   2) In the right superior lobe, a 0.64(L) x 0.52(AP) x 0.57(T) cm slightly hypoechoic, solid nodule was identified. The nodule had a smooth margin, no vascularity, and no microcalcifications.   3) In the right mid lobe, a 0.93(L) x 0.76(AP) x 0.90(T) cm isoechoic, solid nodule was  identified. The nodule had a smooth margin, peripheral and intranodal vascularity, and no microcalcifications.   4) In the left mid lobe, a 1.08(L) x 0.61(AP) x 0.85(T) cm slightly hypoechoic, solid nodule was identified. The nodule had a smooth margin, no vascularity, and no microcalcifications.   5) The nodules identified lacked suspicious US characteristics and did not meet criteria for FNA.  6) A small blood vessel with a calcified wall was identified in the left upper neck in the area of the small palpable mass on physical exam.      RECOMMENDATIONS: Repeat thyroid US recommended to the patient in one year.    Signed: Diandra Elliott MD

## 2021-07-19 NOTE — PROGRESS NOTES
"Chief Complaint   Patient presents with   • Thyroid nodule     New Consult MIKE Barcenas       HPI:   Divine Banuelos is a 75 y.o.female sent in consultation by GILBERT Walter for further evaluation of her thyroid gland. Her history is as follows:    1) Thyroid nodules:  -Patient recalls having a thyroid ultrasound first completed in 12/2017 at [facility] after presenting to her PCP with a small, firm palpable mass in the left upper neck. Thyroid nodules were described and she had serial Thyroid US's in 2019 and 2021.     (12/14/2017, [facility]) Thyroid US: \"The right lobe measures 4.0 x 1.2 x 1.5 cm. The left lobe measures 3.6 x 1.2 x 1.4 cm. The isthmus measures 3 mm.   Right lobe nodules: The right lobe is heterogeneous in its echotexture. Solid nodule superior pole oval well-circumscribed   mildly hypoechoic 10 x 8 x 10 mm. Solid nodule well-circumscribed nearly isoechoic with the gland measures 7 x 5 x 7 mm midportion. Solid nodule nearly isoechoic with the gland well-circumscribed inferior pole measures 11 x 7 x 6 mm.   Left lobe nodules: The gland is heterogeneous in its echotexture. Solid well-circumscribed mildly hypoechoic nodule midportion of gland 9 x 7 x 7 mm.  Impression: Bilateral thyroid nodules indeterminate. Follow-up in one year recommended.\"    (09/12/2019, [facility]) Thyroid US: \"The right lobe measures 3.9 x 1.1 x 1.5 cm. Left lobe measures 3.8 x 1.1 x 1.3 cm.   Right lobe thyroid nodules: Multiple hypoechoic nodules are identified, the upper pole 7 x 5 x 6 mm. Upper pole 8.4 x 6 mm. Mid pole 4 x 6 x 4 mm. Distal 4.5 x 5 x 3 mm. Lower pole dominant lesion 1.3 x 1 0.8 cm   In the left lobe, midpole measuring 11 x 8 x 6 mm   Remainder the left lobe is unremarkable. Isthmus: Normal   When compared to the prior study, there is a slight increase in number of the right lobe thyroid nodules, remaining hypoechoic without suspicious calcification or color duplex abnormality. The dominant " "lesion in the inferior pole the right demonstrates some cystic degeneration with no significant appreciable increase in size.  Impression: 1. Multinodular goiter, right greater than left. 2. Annual follow-up surveillance is recommended.\"    (04/09/2021, [facility]) Thyroid US: \"The right lobe measures 3.8 x 1.1 x 1.3 cm. The left lobe measures 2.5 x 1.0 x 1.1 cm. The isthmus measures 3 mm in anteroposterior dimension. The thyroid gland appears small in size and diffusely heterogeneous in echogenicity with increased vascularity.  Right thyroid nodule(s):   *  6 x 5 x 5 mm solid hypoechoic nodule in the upper lobe, previously 7 x 5 x 6 mm   *  10 x 9 x 8 mm solid heterogeneous isoechoic nodule in the inferior lobe, previously 13 x 9 x 8 mm   *  Other smaller nodules in the right lobe are again noted   Left thyroid nodule(s):   *  9 x 7 x 7 mm solid hypoechoic nodule in the mid lobe, previously 11 x 8 x 6 mm   Isthmus nodule(s):   *  No  Impression:   Small heterogeneous and hyperemic thyroid gland, consistent with thyroiditis. Small bilateral thyroid nodules, similar to mildly decreased from the prior study.\"    FMH: no known thyroid cancer  PMH: no h/o irradiation of head, neck, or chest      2) hypothyroidism due to Hashimoto's thyroiditis:  - diagnosed in 2014 (TSH was 6.66). Was prescribed levothyroxine 25 mcg daily at time of diagnosis.   - In 04/2021, dose was increased to 50 mcg daily for TSH of 5.16.    Current Dose: Levothyroxine 50 mcg   Takes tablet in a.m. on an empty stomach. Waits 30 to 45 minutes before food or hot liquids.  -Good compliance with medication      Review of Systems   Constitutional: Negative.    HENT: Positive for hearing loss and sneezing.    Eyes: Positive for redness.   Respiratory: Negative.    Cardiovascular: Negative.    Gastrointestinal: Negative.    Endocrine: Negative.    Genitourinary: Negative.    Musculoskeletal: Positive for arthralgias, back pain, neck pain and neck " stiffness.   Skin: Negative.    Allergic/Immunologic: Positive for environmental allergies.   Neurological: Negative.    Hematological: Negative.    Psychiatric/Behavioral: Negative.      Past Medical History:   Diagnosis Date   • Anxiety    • Arthritis    • Bell's palsy    • Bronchitis    • Cataract     bilateral   • Depression    • GERD (gastroesophageal reflux disease)    • History of exercise stress test 2019    patient states it was normal   • Hyperlipidemia    • Hypertension     not currently taking medication for BP   • Hypothyroidism    • Malignant neoplasm of upper-outer quadrant of right breast in female, estrogen receptor positive (CMS/HCC) 10/30/2020   • Mitral valve prolapse    • Osteoporosis    • Pessary maintenance    • Wears dentures     upper and lowers     family history includes Breast cancer in her maternal aunt; Colon cancer in her father, paternal grandmother, and paternal uncle; Hypertension in her mother; Thyroid disease in an other family member.  Past Surgical History:   Procedure Laterality Date   • BREAST LUMPECTOMY WITH SENTINEL NODE BIOPSY Right 11/4/2020    Procedure: BREAST LUMPECTOMY WITH SENTINEL NODE BIOPSY RIGHT;  Surgeon: Stuart Caceres MD;  Location: [facility];  Service: General;  Laterality: Right;   • CATARACT EXTRACTION Bilateral    • CATARACT EXTRACTION, BILATERAL     • COLONOSCOPY     • RETINAL DETACHMENT REPAIR Right      Social History     Tobacco Use   • Smoking status: Never Smoker   • Smokeless tobacco: Never Used   Vaping Use   • Vaping Use: Never used   Substance Use Topics   • Alcohol use: No   • Drug use: No     Outpatient Medications Prior to Visit   Medication Sig Dispense Refill   • aspirin 81 MG EC tablet Take 81 mg by mouth daily.     • Calcium Citrate (CITRACAL PO) Take 1 tablet by mouth Daily. + Magnesium      • estradiol (ESTRACE VAGINAL) 0.1 MG/GM vaginal cream Massage pea size amount into vaginal opening twice weekly 42.5 g 1   • levothyroxine  "(SYNTHROID, LEVOTHROID) 50 MCG tablet Take 50 mcg by mouth Daily.     • Misc Natural Products (OSTEO BI-FLEX TRIPLE STRENGTH PO) Take 1 tablet by mouth 2 (Two) Times a Day.     • Omega-3 Fatty Acids (FISH OIL) 1200 MG capsule capsule Take 1,200 mg by mouth 2 (two) times a day.     • PARoxetine (PAXIL) 10 MG tablet Take 10 mg by mouth Every Morning.     • levothyroxine (SYNTHROID, LEVOTHROID) 25 MCG tablet Take 25 mcg by mouth daily.       No facility-administered medications prior to visit.     No Known Allergies    /72   Pulse 60   Ht 158.8 cm (62.5\")   Wt 70.8 kg (156 lb)   LMP  (LMP Unknown)   SpO2 97%   BMI 28.08 kg/m²   Physical Exam  Vitals reviewed.   Constitutional:       General: She is not in acute distress.     Appearance: She is well-developed. She is not diaphoretic.   HENT:      Head: Normocephalic.   Eyes:      Conjunctiva/sclera: Conjunctivae normal.      Pupils: Pupils are equal, round, and reactive to light.   Neck:      Thyroid: No thyromegaly.      Trachea: No tracheal deviation.      Comments: No palpable thyroid nodules    Cardiovascular:      Rate and Rhythm: Normal rate and regular rhythm.      Heart sounds: Normal heart sounds. No murmur heard.     Pulmonary:      Effort: Pulmonary effort is normal. No respiratory distress.      Breath sounds: Normal breath sounds.   Lymphadenopathy:      Cervical: No cervical adenopathy.   Skin:     General: Skin is warm and dry.      Findings: No erythema.   Neurological:      Mental Status: She is alert and oriented to person, place, and time.      Cranial Nerves: No cranial nerve deficit.   Psychiatric:         Behavior: Behavior normal.       LABS/IMAGING: outside records reviewed and summarized in HPI  Results for orders placed or performed in visit on 07/19/21   TSH    Specimen: Blood   Result Value Ref Range    TSH 2.910 0.270 - 4.200 uIU/mL       PROCEDURES (in office):    Thyroid Ultrasound Report  [facility] " Endocrinology  [place], [place]    Date: 07/19/2021  Indication: thyroid nodules  Comparison Imaging: none   Clinical History: 75 y.o. female with h/o hypothyroidism since 2014. Outside US report describing thyroid nodules. Pt with a small, firm palpable mass in the left upper neck.    Real time high resolution imaging of the thyroid gland was performed in transverse and longitudinal planes.  The right lobe measured 4.10 cm in Length x 1.49 cm in AP diameter x 1.68 cm in TV dimension.  The isthmus measured 0.23 cm in thickness.  The left thyroid lobe measured 3.59 cm in length x 1.41 cm in AP diameter x 1.36 cm in TV dimension.    The thyroid gland appeared overall isoechoic with heterogeneous echotexture.    In the right superior lobe, a 0.64(L) x 0.52(AP) x 0.57(T) cm slightly hypoechoic, solid nodule was identified. The nodule had a smooth margin, no vascularity, and no microcalcifications.     In the right mid lobe, a 0.93(L) x 0.76(AP) x 0.90(T) cm isoechoic, solid nodule was identified. The nodule had a smooth margin, peripheral and intranodal vascularity, and no microcalcifications.     In the left mid lobe, a 1.08(L) x 0.61(AP) x 0.85(T) cm slightly hypoechoic, solid nodule was identified. The nodule had a smooth margin, no vascularity, and no microcalcifications.     There were no lesions or abnormal lymph nodes in the left upper neck in the area of the palpable mass on exam. A small blood vessel with a calcified wall was identified in the area of the palpable mass.    No pathologic lymph nodes were seen.     IMPRESSION:   1) The thyroid gland was normal in size by measured dimensions.   2) In the right superior lobe, a 0.64(L) x 0.52(AP) x 0.57(T) cm slightly hypoechoic, solid nodule was identified. The nodule had a smooth margin, no vascularity, and no microcalcifications.   3) In the right mid lobe, a 0.93(L) x 0.76(AP) x 0.90(T) cm isoechoic, solid nodule was identified. The nodule had a smooth margin,  peripheral and intranodal vascularity, and no microcalcifications.   4) In the left mid lobe, a 1.08(L) x 0.61(AP) x 0.85(T) cm slightly hypoechoic, solid nodule was identified. The nodule had a smooth margin, no vascularity, and no microcalcifications.   5) The nodules identified lacked suspicious US characteristics and did not meet criteria for FNA.  6) A small blood vessel with a calcified wall was identified in the left upper neck in the area of the small palpable mass on physical exam.      RECOMMENDATIONS: Repeat thyroid US recommended to the patient in one year.    Signed: Diandra Elliott MD      ASSESSMENT/PLAN:  1) multiple thyroid nodules: Thyroid ultrasound completed in clinic today showed -   - The thyroid gland was normal in size by measured dimensions.   - In the right superior lobe, a 0.64(L) x 0.52(AP) x 0.57(T) cm slightly hypoechoic, solid nodule was identified. The nodule had a smooth margin, no vascularity, and no microcalcifications.   - In the right mid lobe, a 0.93(L) x 0.76(AP) x 0.90(T) cm isoechoic, solid nodule was identified. The nodule had a smooth margin, peripheral and intranodal vascularity, and no microcalcifications.   - In the left mid lobe, a 1.08(L) x 0.61(AP) x 0.85(T) cm slightly hypoechoic, solid nodule was identified. The nodule had a smooth margin, no vascularity, and no microcalcifications.   - The nodules identified lacked suspicious US characteristics and did not meet criteria for FNA.  - A small blood vessel with a calcified wall was identified in the left upper neck in the area of the small palpable mass on physical exam.    RECOMMENDATIONS: Repeat thyroid US recommended to the patient in one year.    Reviewed ultrasound images with patient. She is scheduled to return to clinic in 1 year for follow-up ultrasound to be performed in clinic.     2) hypothyroidism due to Hashimoto's thyroiditis:  - Clinically euthyroid on exam  -TSH checked today within normal limits.  -  Continue levothyroxine 50 mcg daily  - Reviewed proper thyroid hormone administration, and factors to avoid that decrease medication potency and medication absorption.     RTC 07/20/2022    Signed: Diandra Elliott MD

## 2021-07-20 ENCOUNTER — OFFICE VISIT (OUTPATIENT)
Dept: CARDIOLOGY | Facility: CLINIC | Age: 76
End: 2021-07-20

## 2021-07-20 VITALS
SYSTOLIC BLOOD PRESSURE: 130 MMHG | DIASTOLIC BLOOD PRESSURE: 68 MMHG | WEIGHT: 156 LBS | HEIGHT: 63 IN | BODY MASS INDEX: 27.64 KG/M2 | OXYGEN SATURATION: 97 % | HEART RATE: 64 BPM

## 2021-07-20 DIAGNOSIS — R00.1 BRADYCARDIA: ICD-10-CM

## 2021-07-20 DIAGNOSIS — I10 ESSENTIAL HYPERTENSION: ICD-10-CM

## 2021-07-20 DIAGNOSIS — I51.89 DIASTOLIC DYSFUNCTION: Primary | ICD-10-CM

## 2021-07-20 PROCEDURE — 99214 OFFICE O/P EST MOD 30 MIN: CPT | Performed by: INTERNAL MEDICINE

## 2021-07-20 NOTE — PROGRESS NOTES
[facility]  Office visit  Divine Banuelos  1945  [phone]    VISIT DATE:  7/20/2021      PCP: Queenie Amezcua, APRN  [address] [facility] [address]    CC:  No chief complaint on file.      PROBLEM LIST:  1. Hypertension:  a.  History of elevated blood pressure due to stress.  b.  Discontinuation of enalapril secondary to hypotension, June 2013.  2. Bradycardia-Holter October 2017: Average heart rate 52 bpm, range of 36-94 bpm. Asymptomatic  3. Hypothyroidism.  4. Depression.  5. Anxiety.  6. Osteoporosis, mild.  7. Mild dyslipidemia.  8. History of Bell’s palsy.  9. Surgical history:  a. Bilateral cataract extraction.    Cardiac studies and procedures:  September 2019  Echo  · Estimated EF appears to be in the range of 56 - 60%.  · Left ventricular diastolic dysfunction (grade II) consistent with pseudonormalization.  · Left atrial cavity size is borderline dilated.  · Mild mitral valve regurgitation is present  Exercise myocardial perfusion imaging  · A stress test was performed following the Fredis protocol.  · The patient reported shortness of breath during the stress test.  · Blood pressure demonstrated a hypertensive response to stress. (215/97).  · There was no ST segment deviation noted during stress.  · Overall, the patient's exercise capacity was normal. JOSE MARIA%: 0/1.  · Left ventricular ejection fraction is normal (Calculated EF = 66%).  · Myocardial perfusion imaging indicates a normal myocardial perfusion study with no evidence of ischemia.  · Impressions are consistent with a low risk study.    ASSESSMENT:   Diagnosis Plan   1. Diastolic dysfunction     2. Essential hypertension     3. Bradycardia         PLAN:  Congestive heart failure, diastolic, chronic: Currently with mild class II symptoms.  No evidence of volume overload.  Continue regular aerobic exercise.  Afterload well controlled.  We will continue to trend symptoms.    Blood pressure lability: Blood  "pressures currently well controlled.    Sinus bradycardia: Holter monitor consistent with underlying sick sinus physiology, currently asymptomatic. Continue annual clinical follow-up. Currently no indication for pacing.     Hyperlipidemia: Mild elevation in LDL, excellent HDL and triglyceride levels.  Continue dietary modifications and regular exercise.  Not recommending further pharmacologic therapy at this time.    Subjective  Continues to maintain an active lifestyle.  She is able to mow her yard without any limitation.  She reports that her blood pressures are running less than 130/80 mmHg at home.  She has not had to take as needed antihypertensive medications.  Reviewed recent lipid profile.    PHYSICAL EXAMINATION:  Vitals:    07/20/21 1331   BP: 130/68   BP Location: Right arm   Patient Position: Sitting   Pulse: 64   SpO2: 97%   Weight: 70.8 kg (156 lb)   Height: 160 cm (63\")     General Appearance:    Alert, cooperative, no distress, appears stated age   Head:    Normocephalic, without obvious abnormality, atraumatic   Eyes:    conjunctiva/corneas clear   Nose:   Nares normal, septum midline, mucosa normal, no drainage   Throat:   Lips, teeth and gums normal   Neck:   Supple, symmetrical, trachea midline, no carotid    bruit or JVD   Lungs:     Clear to auscultation bilaterally, respirations unlabored   Chest Wall:    No tenderness or deformity    Heart:    Regular rate and rhythm, S1 and S2 normal, no murmur, rub   or gallop, normal carotid impulse bilaterally without bruit.   Abdomen:     Soft, non-tender   Extremities:   Extremities normal, atraumatic, no cyanosis or edema   Pulses:   2+ and symmetric all extremities   Skin:   Skin color, texture, turgor normal, no rashes or lesions       Diagnostic Data:  Procedures  Lab Results   Component Value Date    CHLPL 231 (H) 04/07/2021    TRIG 86 04/07/2021    HDL 69 (H) 04/07/2021     Lab Results   Component Value Date    GLUCOSE 92 11/02/2020    BUN 16 " 11/02/2020    CREATININE 0.84 11/02/2020     11/02/2020    K 4.1 11/02/2020     11/02/2020    CO2 24.4 11/02/2020     Lab Results   Component Value Date    HGBA1C 5.4 04/07/2021     Lab Results   Component Value Date    WBC 6.9 04/07/2021    HGB 13.5 04/07/2021    HCT 44.1 04/07/2021     04/07/2021       Allergies  No Known Allergies    Current Medications    Current Outpatient Medications:   •  aspirin 81 MG EC tablet, Take 81 mg by mouth daily., Disp: , Rfl:   •  Calcium Citrate (CITRACAL PO), Take 1 tablet by mouth Daily. + Magnesium , Disp: , Rfl:   •  estradiol (ESTRACE VAGINAL) 0.1 MG/GM vaginal cream, Massage pea size amount into vaginal opening twice weekly, Disp: 42.5 g, Rfl: 1  •  levothyroxine (SYNTHROID, LEVOTHROID) 50 MCG tablet, Take 50 mcg by mouth Daily., Disp: , Rfl:   •  Misc Natural Products (OSTEO BI-FLEX TRIPLE STRENGTH PO), Take 1 tablet by mouth 2 (Two) Times a Day., Disp: , Rfl:   •  Omega-3 Fatty Acids (FISH OIL) 1200 MG capsule capsule, Take 1,200 mg by mouth 2 (two) times a day., Disp: , Rfl:   •  PARoxetine (PAXIL) 10 MG tablet, Take 10 mg by mouth Every Morning., Disp: , Rfl:           ROS  Review of Systems   Cardiovascular: Positive for dyspnea on exertion. Negative for chest pain, irregular heartbeat and near-syncope.   Respiratory: Positive for shortness of breath. Negative for cough.        SOCIAL HX  Social History     Socioeconomic History   • Marital status:      Spouse name: Not on file   • Number of children: Not on file   • Years of education: Not on file   • Highest education level: Not on file   Tobacco Use   • Smoking status: Never Smoker   • Smokeless tobacco: Never Used   Vaping Use   • Vaping Use: Never used   Substance and Sexual Activity   • Alcohol use: No   • Drug use: No   • Sexual activity: Defer     Birth control/protection: Post-menopausal       FAMILY HX  Family History   Problem Relation Age of Onset   • Hypertension Mother    • Colon  cancer Father    • Colon cancer Paternal Grandmother    • Colon cancer Paternal Uncle    • Breast cancer Maternal Aunt    • Thyroid disease Other         Nephew             Reinaldo Cope III, MD, FACC

## 2021-08-08 PROBLEM — E04.2 MULTIPLE THYROID NODULES: Status: ACTIVE | Noted: 2021-08-08

## 2021-08-11 ENCOUNTER — OFFICE VISIT (OUTPATIENT)
Dept: OBSTETRICS AND GYNECOLOGY | Facility: CLINIC | Age: 76
End: 2021-08-11

## 2021-08-11 VITALS
WEIGHT: 157.2 LBS | DIASTOLIC BLOOD PRESSURE: 72 MMHG | SYSTOLIC BLOOD PRESSURE: 130 MMHG | HEIGHT: 63 IN | BODY MASS INDEX: 27.85 KG/M2

## 2021-08-11 DIAGNOSIS — Z46.89 PESSARY MAINTENANCE: Primary | ICD-10-CM

## 2021-08-11 DIAGNOSIS — N81.11 CYSTOCELE, MIDLINE: ICD-10-CM

## 2021-08-11 PROCEDURE — 99212 OFFICE O/P EST SF 10 MIN: CPT | Performed by: PHYSICIAN ASSISTANT

## 2021-08-11 NOTE — PROGRESS NOTES
Subjective   Chief Complaint   Patient presents with   • Pessary Check     Patient states that she feels something sharp on pessary       Divine Banuelos is a 75 y.o. year old  presenting to be seen for routine pessary check  Reports she may have felt something sharp like a finger nail on the edge of pessary  She is voiding well. No problems with bowel movements       Past Medical History:   Diagnosis Date   • Anxiety    • Arthritis    • Bell's palsy    • Bronchitis    • Cataract     bilateral   • Depression    • GERD (gastroesophageal reflux disease)    • History of exercise stress test     patient states it was normal   • Hyperlipidemia    • Hypertension     not currently taking medication for BP   • Hypothyroidism    • Malignant neoplasm of upper-outer quadrant of right breast in female, estrogen receptor positive (CMS/HCC) 10/30/2020   • Mitral valve prolapse    • Osteoporosis    • Pessary maintenance    • Wears dentures     upper and lowers        Current Outpatient Medications:   •  aspirin 81 MG EC tablet, Take 81 mg by mouth daily., Disp: , Rfl:   •  Calcium Citrate (CITRACAL PO), Take 1 tablet by mouth Daily. + Magnesium , Disp: , Rfl:   •  estradiol (ESTRACE VAGINAL) 0.1 MG/GM vaginal cream, Massage pea size amount into vaginal opening twice weekly, Disp: 42.5 g, Rfl: 1  •  levothyroxine (SYNTHROID, LEVOTHROID) 50 MCG tablet, Take 50 mcg by mouth Daily., Disp: , Rfl:   •  Misc Natural Products (OSTEO BI-FLEX TRIPLE STRENGTH PO), Take 1 tablet by mouth 2 (Two) Times a Day., Disp: , Rfl:   •  Omega-3 Fatty Acids (FISH OIL) 1200 MG capsule capsule, Take 1,200 mg by mouth 2 (two) times a day., Disp: , Rfl:   •  PARoxetine (PAXIL) 10 MG tablet, Take 10 mg by mouth Every Morning., Disp: , Rfl:    No Known Allergies   Past Surgical History:   Procedure Laterality Date   • BREAST LUMPECTOMY WITH SENTINEL NODE BIOPSY Right 2020    Procedure: BREAST LUMPECTOMY WITH SENTINEL NODE BIOPSY RIGHT;   "Surgeon: Stuart Caceres MD;  Location: [facility];  Service: General;  Laterality: Right;   • CATARACT EXTRACTION Bilateral    • CATARACT EXTRACTION, BILATERAL     • COLONOSCOPY     • RETINAL DETACHMENT REPAIR Right       Social History     Socioeconomic History   • Marital status:      Spouse name: Not on file   • Number of children: Not on file   • Years of education: Not on file   • Highest education level: Not on file   Tobacco Use   • Smoking status: Never Smoker   • Smokeless tobacco: Never Used   Vaping Use   • Vaping Use: Never used   Substance and Sexual Activity   • Alcohol use: No   • Drug use: No   • Sexual activity: Defer     Birth control/protection: Post-menopausal      Family History   Problem Relation Age of Onset   • Hypertension Mother    • Colon cancer Father    • Colon cancer Paternal Grandmother    • Colon cancer Paternal Uncle    • Breast cancer Maternal Aunt    • Thyroid disease Other         Nephew       Review of Systems   Constitutional: Negative for chills, diaphoresis and fever.   Gastrointestinal: Negative.    Genitourinary: Negative for difficulty urinating, dysuria, pelvic pain, vaginal bleeding, vaginal discharge and vaginal pain.           Objective   /72   Ht 160 cm (63\")   Wt 71.3 kg (157 lb 3.2 oz)   LMP  (LMP Unknown)   Breastfeeding No   BMI 27.85 kg/m²     Physical Exam  Constitutional:       Appearance: Normal appearance. She is well-developed and well-groomed.   Eyes:      General: Lids are normal.      Extraocular Movements: Extraocular movements intact.      Conjunctiva/sclera: Conjunctivae normal.   Abdominal:      Palpations: Abdomen is soft.      Tenderness: There is no abdominal tenderness.   Genitourinary:     Labia:         Right: No rash, tenderness or lesion.         Left: No rash, tenderness or lesion.       Urethra: No prolapse, urethral pain, urethral swelling or urethral lesion.      Comments: Dish pessary removed cleaned and reinserted. " Nothing sharp or abnormal noted with pessary and patient given pessary to examine before reinserted  Grade 2 cystocele  Neurological:      Mental Status: She is alert.   Psychiatric:         Behavior: Behavior is cooperative.            Result Review :                   Assessment and Plan  Diagnoses and all orders for this visit:    1. Pessary maintenance (Primary)    2. Cystocele, midline      Patient Instructions   Follow up 3 months or prn             This note was electronically signed.    Denise Hernandez PA-C   August 11, 2021

## 2021-09-21 ENCOUNTER — OFFICE VISIT (OUTPATIENT)
Dept: ORTHOPEDIC SURGERY | Facility: CLINIC | Age: 76
End: 2021-09-21

## 2021-09-21 VITALS — WEIGHT: 159.8 LBS | TEMPERATURE: 98.6 F | HEIGHT: 63 IN | BODY MASS INDEX: 28.31 KG/M2 | RESPIRATION RATE: 18 BRPM

## 2021-09-21 DIAGNOSIS — M75.52 ACUTE BURSITIS OF LEFT SHOULDER: ICD-10-CM

## 2021-09-21 PROCEDURE — 20610 DRAIN/INJ JOINT/BURSA W/O US: CPT | Performed by: PHYSICIAN ASSISTANT

## 2021-09-21 RX ORDER — LIDOCAINE HYDROCHLORIDE 10 MG/ML
2 INJECTION, SOLUTION INFILTRATION; PERINEURAL
Status: COMPLETED | OUTPATIENT
Start: 2021-09-21 | End: 2021-09-21

## 2021-09-21 RX ORDER — TRIAMCINOLONE ACETONIDE 40 MG/ML
40 INJECTION, SUSPENSION INTRA-ARTICULAR; INTRAMUSCULAR
Status: COMPLETED | OUTPATIENT
Start: 2021-09-21 | End: 2021-09-21

## 2021-09-21 RX ADMIN — TRIAMCINOLONE ACETONIDE 40 MG: 40 INJECTION, SUSPENSION INTRA-ARTICULAR; INTRAMUSCULAR at 13:24

## 2021-09-21 RX ADMIN — LIDOCAINE HYDROCHLORIDE 2 ML: 10 INJECTION, SOLUTION INFILTRATION; PERINEURAL at 13:24

## 2021-09-21 NOTE — PROGRESS NOTES
"Subjective   Divine Banuelos is a 75 y.o. female here today for injection therapy.    Chief Complaint   Patient presents with   • Left Shoulder - Injections     Last injection 6/22/20.   Patient would like to repeat a left shoulder cortisone injection.  She states the last injection provided significant relief.    Past Medical History:   Diagnosis Date   • Anxiety    • Arthritis    • Bell's palsy    • Bronchitis    • Cataract     bilateral   • Depression    • GERD (gastroesophageal reflux disease)    • History of exercise stress test 2019    patient states it was normal   • Hyperlipidemia    • Hypertension     not currently taking medication for BP   • Hypothyroidism    • Malignant neoplasm of upper-outer quadrant of right breast in female, estrogen receptor positive (CMS/[facility]) 10/30/2020   • Mitral valve prolapse    • Osteoporosis    • Pessary maintenance    • Wears dentures     upper and lowers         Past Surgical History:   Procedure Laterality Date   • BREAST LUMPECTOMY WITH SENTINEL NODE BIOPSY Right 11/4/2020    Procedure: BREAST LUMPECTOMY WITH SENTINEL NODE BIOPSY RIGHT;  Surgeon: Stuart Caceres MD;  Location: [facility];  Service: General;  Laterality: Right;   • CATARACT EXTRACTION Bilateral    • CATARACT EXTRACTION, BILATERAL     • COLONOSCOPY     • RETINAL DETACHMENT REPAIR Right        No Known Allergies    Objective   Temp 98.6 °F (37 °C)   Resp 18   Ht 160 cm (62.99\")   Wt 72.5 kg (159 lb 12.8 oz)   LMP  (LMP Unknown)   BMI 28.31 kg/m²    Physical Exam    Skin exam stable with no erythema, ecchymosis or rash.  No new swelling.  No motor or sensory changes.  Distal pulse intact.    Large Joint Arthrocentesis: L subacromial bursa  Date/Time: 9/21/2021 1:24 PM  Consent given by: patient  Site marked: site marked  Timeout: Immediately prior to procedure a time out was called to verify the correct patient, procedure, equipment, support staff and site/side marked as required   Supporting " Documentation  Indications: pain   Procedure Details  Location: shoulder - L subacromial bursa  Preparation: Patient was prepped and draped in the usual sterile fashion  Needle size: 22 G  Approach: posterior  Medications administered: 2 mL lidocaine 1 %; 40 mg triamcinolone acetonide 40 MG/ML  Patient tolerance: patient tolerated the procedure well with no immediate complications          Assessment/Plan      Diagnosis Plan   1. Acute bursitis of left shoulder         Discussion of orthopaedic goals and activities and patient and/or guardian expressed appreciation.  Guided on proper techniques for mobility, strength, agility and/or conditioning exercises  Ice, heat, and/or modalities as beneficial  Watch for signs and symptoms of infection  Call or notify for any adverse effect from injection therapy    Recommendations:  Follow up in 3 months  Patient agreeable to call or return sooner for any concerns.

## 2021-11-30 ENCOUNTER — OFFICE VISIT (OUTPATIENT)
Dept: OBSTETRICS AND GYNECOLOGY | Facility: CLINIC | Age: 76
End: 2021-11-30

## 2021-11-30 VITALS
BODY MASS INDEX: 28.53 KG/M2 | HEIGHT: 63 IN | WEIGHT: 161 LBS | DIASTOLIC BLOOD PRESSURE: 68 MMHG | SYSTOLIC BLOOD PRESSURE: 120 MMHG

## 2021-11-30 DIAGNOSIS — N81.11 CYSTOCELE, MIDLINE: ICD-10-CM

## 2021-11-30 DIAGNOSIS — Z46.89 PESSARY MAINTENANCE: Primary | ICD-10-CM

## 2021-11-30 PROCEDURE — 99212 OFFICE O/P EST SF 10 MIN: CPT | Performed by: PHYSICIAN ASSISTANT

## 2021-11-30 NOTE — PROGRESS NOTES
Subjective   Chief Complaint   Patient presents with   • Pessary Check     doing well       Divine Banuelos is a 76 y.o. year old  presenting to be seen for routine pessary maintenance.  She has no complaints or concerns.  She is voiding well and is not having any issues with bowel movements.  No vaginal bleeding or discharge    Past Medical History:   Diagnosis Date   • Anxiety    • Arthritis    • Bell's palsy    • Bronchitis    • Cataract     bilateral   • Depression    • GERD (gastroesophageal reflux disease)    • History of exercise stress test 2019    patient states it was normal   • Hyperlipidemia    • Hypertension     not currently taking medication for BP   • Hypothyroidism    • Malignant neoplasm of upper-outer quadrant of right breast in female, estrogen receptor positive (HCC) 10/30/2020   • Mitral valve prolapse    • Osteoporosis    • Pessary maintenance    • Wears dentures     upper and lowers        Current Outpatient Medications:   •  aspirin 81 MG EC tablet, Take 81 mg by mouth daily., Disp: , Rfl:   •  Calcium Citrate (CITRACAL PO), Take 1 tablet by mouth Daily. + Magnesium , Disp: , Rfl:   •  estradiol (ESTRACE VAGINAL) 0.1 MG/GM vaginal cream, Massage pea size amount into vaginal opening twice weekly, Disp: 42.5 g, Rfl: 1  •  levothyroxine (SYNTHROID, LEVOTHROID) 50 MCG tablet, Take 50 mcg by mouth Daily., Disp: , Rfl:   •  Misc Natural Products (OSTEO BI-FLEX TRIPLE STRENGTH PO), Take 1 tablet by mouth 2 (Two) Times a Day., Disp: , Rfl:   •  Omega-3 Fatty Acids (FISH OIL) 1200 MG capsule capsule, Take 1,200 mg by mouth 2 (two) times a day., Disp: , Rfl:   •  PARoxetine (PAXIL) 10 MG tablet, Take 10 mg by mouth Every Morning., Disp: , Rfl:    No Known Allergies   Past Surgical History:   Procedure Laterality Date   • BREAST LUMPECTOMY WITH SENTINEL NODE BIOPSY Right 2020    Procedure: BREAST LUMPECTOMY WITH SENTINEL NODE BIOPSY RIGHT;  Surgeon: Stuart Caceres MD;  Location: [facility]  "OR;  Service: General;  Laterality: Right;   • CATARACT EXTRACTION Bilateral    • CATARACT EXTRACTION, BILATERAL     • COLONOSCOPY     • RETINAL DETACHMENT REPAIR Right       Social History     Socioeconomic History   • Marital status:    Tobacco Use   • Smoking status: Never Smoker   • Smokeless tobacco: Never Used   Vaping Use   • Vaping Use: Never used   Substance and Sexual Activity   • Alcohol use: No   • Drug use: No   • Sexual activity: Defer     Birth control/protection: Post-menopausal      Family History   Problem Relation Age of Onset   • Hypertension Mother    • Colon cancer Father    • Colon cancer Paternal Grandmother    • Colon cancer Paternal Uncle    • Breast cancer Maternal Aunt    • Thyroid disease Other         Nephew       Review of Systems   Constitutional: Negative for chills, diaphoresis and fever.   Gastrointestinal: Negative.    Genitourinary: Negative for difficulty urinating, dysuria, pelvic pain, vaginal bleeding and vaginal discharge.           Objective   /68   Ht 160 cm (63\")   Wt 73 kg (161 lb)   LMP  (LMP Unknown)   Breastfeeding No   BMI 28.52 kg/m²     Physical Exam  Constitutional:       Appearance: Normal appearance. She is well-developed and well-groomed.   Eyes:      General: Lids are normal.      Extraocular Movements: Extraocular movements intact.      Conjunctiva/sclera: Conjunctivae normal.   Genitourinary:     Labia:         Right: No rash, tenderness or lesion.         Left: No rash, tenderness or lesion.       Urethra: No prolapse, urethral pain, urethral swelling or urethral lesion.      Vagina: No vaginal discharge, tenderness or lesions.      Comments: Dish pessary is removed cleaned and reinserted.  No vaginal wall abrasions or erosion.  Grade 2 cystocele  Skin:     General: Skin is warm and dry.      Findings: No bruising or lesion.   Neurological:      Mental Status: She is alert.   Psychiatric:         Attention and Perception: Attention " normal.         Mood and Affect: Mood normal.         Speech: Speech normal.         Behavior: Behavior is cooperative.            Result Review :                   Assessment and Plan  Diagnoses and all orders for this visit:    1. Pessary maintenance (Primary)    2. Cystocele, midline      There are no Patient Instructions on file for this visit.           This note was electronically signed.    Denise Hernandez PA-C   November 30, 2021

## 2022-02-07 ENCOUNTER — HOSPITAL ENCOUNTER (OUTPATIENT)
Facility: HOSPITAL | Age: 77
Discharge: HOME | End: 2022-02-07
Payer: MEDICARE

## 2022-02-07 LAB
A/G RATIO: 1.5 (ref 0.8–2)
ALBUMIN SERPL-MCNC: 4.1 G/DL (ref 3.4–4.8)
ALP BLD-CCNC: 116 U/L (ref 25–100)
ALT SERPL-CCNC: 11 U/L (ref 4–36)
ANION GAP SERPL CALCULATED.3IONS-SCNC: 13 MMOL/L (ref 3–16)
AST SERPL-CCNC: 16 U/L (ref 8–33)
BASOPHILS ABSOLUTE: 0.1 K/UL (ref 0–0.1)
BASOPHILS RELATIVE PERCENT: 0.8 %
BILIRUB SERPL-MCNC: 0.3 MG/DL (ref 0.3–1.2)
BUN BLDV-MCNC: 16 MG/DL (ref 6–20)
CALCIUM SERPL-MCNC: 9.5 MG/DL (ref 8.5–10.5)
CHLORIDE BLD-SCNC: 103 MMOL/L (ref 98–107)
CHOLESTEROL, TOTAL: 249 MG/DL (ref 0–200)
CO2: 25 MMOL/L (ref 20–30)
CREAT SERPL-MCNC: 0.9 MG/DL (ref 0.4–1.2)
EOSINOPHILS ABSOLUTE: 0.2 K/UL (ref 0–0.4)
EOSINOPHILS RELATIVE PERCENT: 2.7 %
FOLATE: 11.08 NG/ML
GFR AFRICAN AMERICAN: >59
GFR NON-AFRICAN AMERICAN: >60
GLOBULIN: 2.7 G/DL
GLUCOSE BLD-MCNC: 89 MG/DL (ref 74–106)
HBA1C MFR BLD: 5.5 %
HCT VFR BLD CALC: 44.4 % (ref 37–47)
HDLC SERPL-MCNC: 72 MG/DL (ref 40–60)
HEMOGLOBIN: 13.9 G/DL (ref 11.5–16.5)
IMMATURE GRANULOCYTES #: 0 K/UL
IMMATURE GRANULOCYTES %: 0.5 % (ref 0–5)
LDL CHOLESTEROL CALCULATED: 162 MG/DL
LYMPHOCYTES ABSOLUTE: 1.6 K/UL (ref 1.5–4)
LYMPHOCYTES RELATIVE PERCENT: 27.2 %
MCH RBC QN AUTO: 29.8 PG (ref 27–32)
MCHC RBC AUTO-ENTMCNC: 31.3 G/DL (ref 31–35)
MCV RBC AUTO: 95.3 FL (ref 80–100)
MONOCYTES ABSOLUTE: 0.5 K/UL (ref 0.2–0.8)
MONOCYTES RELATIVE PERCENT: 7.6 %
NEUTROPHILS ABSOLUTE: 3.7 K/UL (ref 2–7.5)
NEUTROPHILS RELATIVE PERCENT: 61.2 %
PDW BLD-RTO: 13.1 % (ref 11–16)
PLATELET # BLD: 321 K/UL (ref 150–400)
PMV BLD AUTO: 10.7 FL (ref 6–10)
POTASSIUM SERPL-SCNC: 4.2 MMOL/L (ref 3.4–5.1)
RBC # BLD: 4.66 M/UL (ref 3.8–5.8)
SODIUM BLD-SCNC: 141 MMOL/L (ref 136–145)
TOTAL PROTEIN: 6.8 G/DL (ref 6.4–8.3)
TRIGL SERPL-MCNC: 73 MG/DL (ref 0–249)
TSH SERPL DL<=0.05 MIU/L-ACNC: 3.18 UIU/ML (ref 0.27–4.2)
VITAMIN B-12: 315 PG/ML (ref 211–911)
VITAMIN D 25-HYDROXY: 33.6 (ref 32–100)
VLDLC SERPL CALC-MCNC: 15 MG/DL
WBC # BLD: 6 K/UL (ref 4–11)

## 2022-02-07 PROCEDURE — 36415 COLL VENOUS BLD VENIPUNCTURE: CPT

## 2022-02-07 PROCEDURE — 82607 VITAMIN B-12: CPT

## 2022-02-07 PROCEDURE — 80061 LIPID PANEL: CPT

## 2022-02-07 PROCEDURE — 80053 COMPREHEN METABOLIC PANEL: CPT

## 2022-02-07 PROCEDURE — 83036 HEMOGLOBIN GLYCOSYLATED A1C: CPT

## 2022-02-07 PROCEDURE — 82746 ASSAY OF FOLIC ACID SERUM: CPT

## 2022-02-07 PROCEDURE — 85025 COMPLETE CBC W/AUTO DIFF WBC: CPT

## 2022-02-07 PROCEDURE — 82306 VITAMIN D 25 HYDROXY: CPT

## 2022-02-07 PROCEDURE — 84443 ASSAY THYROID STIM HORMONE: CPT

## 2022-02-22 ENCOUNTER — OFFICE VISIT (OUTPATIENT)
Dept: OBSTETRICS AND GYNECOLOGY | Facility: CLINIC | Age: 77
End: 2022-02-22

## 2022-02-22 VITALS
WEIGHT: 162.4 LBS | BODY MASS INDEX: 28.77 KG/M2 | HEIGHT: 63 IN | SYSTOLIC BLOOD PRESSURE: 150 MMHG | DIASTOLIC BLOOD PRESSURE: 74 MMHG

## 2022-02-22 DIAGNOSIS — N81.11 CYSTOCELE, MIDLINE: ICD-10-CM

## 2022-02-22 DIAGNOSIS — Z46.89 PESSARY MAINTENANCE: Primary | ICD-10-CM

## 2022-02-22 PROCEDURE — 99212 OFFICE O/P EST SF 10 MIN: CPT | Performed by: PHYSICIAN ASSISTANT

## 2022-02-22 NOTE — PATIENT INSTRUCTIONS
If able to manage and maintain her own pessary patient may come in every 6 months for GYN exams.  If she feels she has any issues with reinserting the pessary she may continue to come for pessary maintenance visits every 2 months or as needed.

## 2022-02-22 NOTE — PROGRESS NOTES
Subjective   Chief Complaint   Patient presents with   • Pessary Check     Patient would like to learn how to clean pessary.       Divine Banuelos is a 76 y.o. year old  presenting to be seen for routine pessary maintenance.  She feels like she would like to start removing and reinserting her own pessary.  Reports she is able to remove her pessary without problems but is not sure how to hold it to reinsert the pessary.  doing well with pessary.  She is not have any issues voiding.  No pelvic or vaginal pain.  No problems with bowel movements.  No vaginal bleeding.    Past Medical History:   Diagnosis Date   • Anxiety    • Arthritis    • Bell's palsy    • Bronchitis    • Cataract     bilateral   • Depression    • GERD (gastroesophageal reflux disease)    • History of exercise stress test 2019    patient states it was normal   • Hyperlipidemia    • Hypertension     not currently taking medication for BP   • Hypothyroidism    • Malignant neoplasm of upper-outer quadrant of right breast in female, estrogen receptor positive (HCC) 10/30/2020   • Mitral valve prolapse    • Osteoporosis    • Pessary maintenance    • Wears dentures     upper and lowers        Current Outpatient Medications:   •  aspirin 81 MG EC tablet, Take 81 mg by mouth daily., Disp: , Rfl:   •  Calcium Citrate (CITRACAL PO), Take 1 tablet by mouth Daily. + Magnesium , Disp: , Rfl:   •  estradiol (ESTRACE VAGINAL) 0.1 MG/GM vaginal cream, Massage pea size amount into vaginal opening twice weekly, Disp: 42.5 g, Rfl: 1  •  levothyroxine (SYNTHROID, LEVOTHROID) 50 MCG tablet, Take 50 mcg by mouth Daily., Disp: , Rfl:   •  Misc Natural Products (OSTEO BI-FLEX TRIPLE STRENGTH PO), Take 1 tablet by mouth 2 (Two) Times a Day., Disp: , Rfl:   •  Omega-3 Fatty Acids (FISH OIL) 1200 MG capsule capsule, Take 1,200 mg by mouth 2 (two) times a day., Disp: , Rfl:   •  PARoxetine (PAXIL) 10 MG tablet, Take 10 mg by mouth Every Morning., Disp: , Rfl:    No Known  "Allergies   Past Surgical History:   Procedure Laterality Date   • BREAST LUMPECTOMY WITH SENTINEL NODE BIOPSY Right 11/4/2020    Procedure: BREAST LUMPECTOMY WITH SENTINEL NODE BIOPSY RIGHT;  Surgeon: Stuart Caceres MD;  Location: [facility];  Service: General;  Laterality: Right;   • CATARACT EXTRACTION Bilateral    • CATARACT EXTRACTION, BILATERAL     • COLONOSCOPY     • RETINAL DETACHMENT REPAIR Right       Social History     Socioeconomic History   • Marital status:    Tobacco Use   • Smoking status: Never Smoker   • Smokeless tobacco: Never Used   Vaping Use   • Vaping Use: Never used   Substance and Sexual Activity   • Alcohol use: No   • Drug use: No   • Sexual activity: Defer     Birth control/protection: Post-menopausal      Family History   Problem Relation Age of Onset   • Hypertension Mother    • Colon cancer Father    • Colon cancer Paternal Grandmother    • Colon cancer Paternal Uncle    • Breast cancer Maternal Aunt    • Thyroid disease Other         Nephew       Review of Systems   Constitutional: Negative for chills, diaphoresis and fever.   Gastrointestinal: Negative.    Genitourinary: Negative for difficulty urinating, dysuria, pelvic pain, vaginal bleeding and vaginal pain.           Objective   /74   Ht 160 cm (63\")   Wt 73.7 kg (162 lb 6.4 oz)   LMP  (LMP Unknown)   Breastfeeding No   BMI 28.77 kg/m²     Physical Exam  Constitutional:       Appearance: Normal appearance. She is well-developed and well-groomed.   Eyes:      General: Lids are normal.      Extraocular Movements: Extraocular movements intact.      Conjunctiva/sclera: Conjunctivae normal.   Genitourinary:     Labia:         Right: No rash, tenderness or lesion.         Left: No rash, tenderness or lesion.       Urethra: No prolapse, urethral pain, urethral swelling or urethral lesion.      Comments: Dish pessary removed cleaned and reinserted  Patient instructed in how to reinsert pessary  No vaginal wall " abrasions or erosion  Grade 2 cystocele  Neurological:      General: No focal deficit present.      Mental Status: She is alert and oriented to person, place, and time.   Psychiatric:         Attention and Perception: Attention normal.         Mood and Affect: Mood normal.         Speech: Speech normal.         Behavior: Behavior is cooperative.            Result Review :                   Assessment and Plan  Diagnoses and all orders for this visit:    1. Pessary maintenance (Primary)    2. Cystocele, midline      Patient Instructions   If able to manage and maintain her own pessary patient may come in every 6 months for GYN exams.  If she feels she has any issues with reinserting the pessary she may continue to come for pessary maintenance visits every 2 months or as needed.             This note was electronically signed.    Denise Hernandez PA-C   February 22, 2022

## 2022-03-03 ENCOUNTER — TELEPHONE (OUTPATIENT)
Dept: CARDIOLOGY | Facility: CLINIC | Age: 77
End: 2022-03-03

## 2022-03-03 NOTE — TELEPHONE ENCOUNTER
Have her continue to keep a home blood pressure log.  She needs to make sure that she is avoiding dehydration as the weather gets warmer.  We will arrange for ambulatory ECG monitor if she has recurrent episodes of tachypalpitations.

## 2022-03-03 NOTE — TELEPHONE ENCOUNTER
Had an episode yesterday that lasted 1.5 hours. She was outside cleaning the inside of her car and she felt like she may have gotten over heated.Felt her heart racing/palpitations and felt like she was going to pass out.No chest pain or shortness of air reported.During the episode her b/p was 177/107 . An hour after episode  B/p 125/77 HR 79. Please advise.

## 2022-03-17 ENCOUNTER — HOSPITAL ENCOUNTER (INPATIENT)
Facility: HOSPITAL | Age: 77
LOS: 1 days | Discharge: HOME | DRG: 065 | End: 2022-03-19
Attending: EMERGENCY MEDICINE | Admitting: INTERNAL MEDICINE
Payer: MEDICARE

## 2022-03-17 ENCOUNTER — APPOINTMENT (OUTPATIENT)
Dept: CT IMAGING | Facility: HOSPITAL | Age: 77
DRG: 065 | End: 2022-03-17
Payer: MEDICARE

## 2022-03-17 DIAGNOSIS — G45.9 TRANSIENT ISCHEMIC ATTACK: Primary | ICD-10-CM

## 2022-03-17 DIAGNOSIS — I16.1 HYPERTENSIVE EMERGENCY: ICD-10-CM

## 2022-03-17 PROBLEM — R29.898 RIGHT HAND WEAKNESS: Status: ACTIVE | Noted: 2022-03-17

## 2022-03-17 LAB
A/G RATIO: 1.3 (ref 0.8–2)
ALBUMIN SERPL-MCNC: 4 G/DL (ref 3.4–4.8)
ALP BLD-CCNC: 103 U/L (ref 25–100)
ALT SERPL-CCNC: 15 U/L (ref 4–36)
ANION GAP SERPL CALCULATED.3IONS-SCNC: 11 MMOL/L (ref 3–16)
APTT: 26.2 SEC (ref 22.5–34.9)
AST SERPL-CCNC: 18 U/L (ref 8–33)
BASOPHILS ABSOLUTE: 0 K/UL (ref 0–0.1)
BASOPHILS RELATIVE PERCENT: 0.5 %
BILIRUB SERPL-MCNC: 0.4 MG/DL (ref 0.3–1.2)
BUN BLDV-MCNC: 14 MG/DL (ref 6–20)
CALCIUM SERPL-MCNC: 9 MG/DL (ref 8.5–10.5)
CHLORIDE BLD-SCNC: 103 MMOL/L (ref 98–107)
CHP ED QC CHECK: YES
CO2: 26 MMOL/L (ref 20–30)
CREAT SERPL-MCNC: 0.9 MG/DL (ref 0.4–1.2)
EOSINOPHILS ABSOLUTE: 0.2 K/UL (ref 0–0.4)
EOSINOPHILS RELATIVE PERCENT: 2.9 %
GFR AFRICAN AMERICAN: >59
GFR NON-AFRICAN AMERICAN: >60
GLOBULIN: 3.2 G/DL
GLUCOSE BLD-MCNC: 106 MG/DL
GLUCOSE BLD-MCNC: 106 MG/DL (ref 74–106)
GLUCOSE BLD-MCNC: 99 MG/DL (ref 74–106)
HCT VFR BLD CALC: 43.1 % (ref 37–47)
HEMOGLOBIN: 13.7 G/DL (ref 11.5–16.5)
IMMATURE GRANULOCYTES #: 0 K/UL
IMMATURE GRANULOCYTES %: 0.3 % (ref 0–5)
INR BLD: 0.99 (ref 0.88–1.11)
LYMPHOCYTES ABSOLUTE: 2 K/UL (ref 1.5–4)
LYMPHOCYTES RELATIVE PERCENT: 30.3 %
MCH RBC QN AUTO: 29.5 PG (ref 27–32)
MCHC RBC AUTO-ENTMCNC: 31.8 G/DL (ref 31–35)
MCV RBC AUTO: 92.7 FL (ref 80–100)
MONOCYTES ABSOLUTE: 0.6 K/UL (ref 0.2–0.8)
MONOCYTES RELATIVE PERCENT: 9.4 %
NEUTROPHILS ABSOLUTE: 3.7 K/UL (ref 2–7.5)
NEUTROPHILS RELATIVE PERCENT: 56.6 %
PDW BLD-RTO: 13.3 % (ref 11–16)
PERFORMED ON: NORMAL
PLATELET # BLD: 285 K/UL (ref 150–400)
PMV BLD AUTO: 9.7 FL (ref 6–10)
POTASSIUM SERPL-SCNC: 3.9 MMOL/L (ref 3.4–5.1)
PROTHROMBIN TIME: 12.6 SEC (ref 11.6–13.8)
RBC # BLD: 4.65 M/UL (ref 3.8–5.8)
SODIUM BLD-SCNC: 140 MMOL/L (ref 136–145)
TOTAL PROTEIN: 7.2 G/DL (ref 6.4–8.3)
TROPONIN: <0.3 NG/ML
WBC # BLD: 6.5 K/UL (ref 4–11)

## 2022-03-17 PROCEDURE — 85730 THROMBOPLASTIN TIME PARTIAL: CPT

## 2022-03-17 PROCEDURE — 84484 ASSAY OF TROPONIN QUANT: CPT

## 2022-03-17 PROCEDURE — G0378 HOSPITAL OBSERVATION PER HR: HCPCS

## 2022-03-17 PROCEDURE — 97161 PT EVAL LOW COMPLEX 20 MIN: CPT

## 2022-03-17 PROCEDURE — 80053 COMPREHEN METABOLIC PANEL: CPT

## 2022-03-17 PROCEDURE — 96372 THER/PROPH/DIAG INJ SC/IM: CPT

## 2022-03-17 PROCEDURE — 85610 PROTHROMBIN TIME: CPT

## 2022-03-17 PROCEDURE — 85025 COMPLETE CBC W/AUTO DIFF WBC: CPT

## 2022-03-17 PROCEDURE — 6370000000 HC RX 637 (ALT 250 FOR IP): Performed by: PHYSICIAN ASSISTANT

## 2022-03-17 PROCEDURE — 70450 CT HEAD/BRAIN W/O DYE: CPT

## 2022-03-17 PROCEDURE — 6360000002 HC RX W HCPCS: Performed by: PHYSICIAN ASSISTANT

## 2022-03-17 PROCEDURE — 36415 COLL VENOUS BLD VENIPUNCTURE: CPT

## 2022-03-17 PROCEDURE — 99283 EMERGENCY DEPT VISIT LOW MDM: CPT

## 2022-03-17 RX ORDER — LEVOTHYROXINE SODIUM 0.03 MG/1
25 TABLET ORAL DAILY
Status: DISCONTINUED | OUTPATIENT
Start: 2022-03-18 | End: 2022-03-18

## 2022-03-17 RX ORDER — ONDANSETRON 4 MG/1
4 TABLET, ORALLY DISINTEGRATING ORAL EVERY 8 HOURS PRN
Status: DISCONTINUED | OUTPATIENT
Start: 2022-03-17 | End: 2022-03-19 | Stop reason: HOSPADM

## 2022-03-17 RX ORDER — CALCIUM CARBONATE 500(1250)
500 TABLET ORAL DAILY
Status: DISCONTINUED | OUTPATIENT
Start: 2022-03-18 | End: 2022-03-18

## 2022-03-17 RX ORDER — POLYETHYLENE GLYCOL 3350 17 G/17G
17 POWDER, FOR SOLUTION ORAL DAILY PRN
Status: DISCONTINUED | OUTPATIENT
Start: 2022-03-17 | End: 2022-03-19 | Stop reason: HOSPADM

## 2022-03-17 RX ORDER — CHLORAL HYDRATE 500 MG
3000 CAPSULE ORAL 2 TIMES DAILY
Status: DISCONTINUED | OUTPATIENT
Start: 2022-03-17 | End: 2022-03-18

## 2022-03-17 RX ORDER — PAROXETINE HYDROCHLORIDE 20 MG/1
10 TABLET, FILM COATED ORAL EVERY MORNING
Status: DISCONTINUED | OUTPATIENT
Start: 2022-03-18 | End: 2022-03-19 | Stop reason: HOSPADM

## 2022-03-17 RX ORDER — ACETAMINOPHEN 650 MG/1
650 SUPPOSITORY RECTAL EVERY 6 HOURS PRN
Status: DISCONTINUED | OUTPATIENT
Start: 2022-03-17 | End: 2022-03-19 | Stop reason: HOSPADM

## 2022-03-17 RX ORDER — ONDANSETRON 2 MG/ML
4 INJECTION INTRAMUSCULAR; INTRAVENOUS EVERY 6 HOURS PRN
Status: DISCONTINUED | OUTPATIENT
Start: 2022-03-17 | End: 2022-03-19 | Stop reason: HOSPADM

## 2022-03-17 RX ORDER — ASPIRIN 81 MG/1
81 TABLET ORAL DAILY
Status: DISCONTINUED | OUTPATIENT
Start: 2022-03-18 | End: 2022-03-18

## 2022-03-17 RX ORDER — ATORVASTATIN CALCIUM 80 MG/1
80 TABLET, FILM COATED ORAL NIGHTLY
Status: DISCONTINUED | OUTPATIENT
Start: 2022-03-17 | End: 2022-03-18

## 2022-03-17 RX ORDER — ACETAMINOPHEN 325 MG/1
650 TABLET ORAL EVERY 6 HOURS PRN
Status: DISCONTINUED | OUTPATIENT
Start: 2022-03-17 | End: 2022-03-19 | Stop reason: HOSPADM

## 2022-03-17 RX ORDER — HYDRALAZINE HYDROCHLORIDE 20 MG/ML
10 INJECTION INTRAMUSCULAR; INTRAVENOUS EVERY 6 HOURS PRN
Status: DISCONTINUED | OUTPATIENT
Start: 2022-03-17 | End: 2022-03-17

## 2022-03-17 RX ADMIN — ACETAMINOPHEN 650 MG: 325 TABLET, FILM COATED ORAL at 16:05

## 2022-03-17 RX ADMIN — ENOXAPARIN SODIUM 40 MG: 100 INJECTION SUBCUTANEOUS at 20:47

## 2022-03-17 ASSESSMENT — PAIN SCALES - GENERAL
PAINLEVEL_OUTOF10: 4
PAINLEVEL_OUTOF10: 0

## 2022-03-17 NOTE — ED PROVIDER NOTES
[facility] ENCOUNTER      Pt Name: Lexis Bejarano  MRN: [id number]  YOB: 1945  Date of evaluation: 3/17/2022  Provider: Fortunato Saldivar DO    CHIEF COMPLAINT       Chief Complaint   Patient presents with    Extremity Weakness         HISTORY OF PRESENT ILLNESS  (Location/Symptom, Timing/Onset, Context/Setting, Quality, Duration, Modifying Factors, Severity.)   Dunia Rivas is a 67 y.o. female who presents to the emergency department with a chief complaint of right hand weakness which started 2 hours prior to arrival in the emergency department. Patient states symptoms are improving but right hand still feels little bit weak. Patient denies other symptoms. Patient denies difficulty with speech patient denies abdominal pain chest pain shortness of breath. Nursing notes were reviewed. REVIEW OFSYSTEMS    (2-9 systems for level 4, 10 or more for level 5)   ROS:  General:  No fevers, no chills, no weakness  Cardiovascular:  No chest pain, no palpitations  Respiratory:  No shortness of breath, no cough, no wheezing  Gastrointestinal:  No pain, no nausea, no vomiting, no diarrhea  Musculoskeletal:  No muscle pain, no joint pain  Skin:  No rash, no easy bruising  Neurologic:  No speech problems, no headache, right hand weakness  Psychiatric:  No anxiety  Genitourinary:  No dysuria, no hematuria    Except as noted above the remainder of the review of systems was reviewed and negative.        PAST MEDICAL HISTORY     Past Medical History:   Diagnosis Date    Anxiety     Bradycardia     HTN (hypertension)     Hypothyroid     Osteoarthritis          SURGICAL HISTORY       Past Surgical History:   Procedure Laterality Date    EYE SURGERY           CURRENT MEDICATIONS       Previous Medications    ASPIRIN EC 81 MG EC TABLET    Take 81 mg by mouth    B COMPLEX CAPS    Take 1 capsule by mouth    CALCIUM CITRATE (CALCITRATE) 950 MG TABLET Organization Meetings: Not on file    Marital Status: Not on file   Intimate Partner Violence:     Fear of Current or Ex-Partner: Not on file    Emotionally Abused: Not on file    Physically Abused: Not on file    Sexually Abused: Not on file   Housing Stability:     Unable to Pay for Housing in the Last Year: Not on file    Number of [place] in the Last Year: Not on file    Unstable Housing in the Last Year: Not on file         PHYSICAL EXAM    (up to 7 for level 4, 8 or more for level 5)     ED Triage Vitals   BP Temp Temp src Pulse Resp SpO2 Height Weight   03/17/22 1209 -- -- 03/17/22 1209 03/17/22 1209 03/17/22 1209 03/17/22 1208 03/17/22 1208   (!) 213/68   55 18 98 % 5' 3\" (1.6 m) 160 lb (72.6 kg)       Physical Exam  General :Patient is awake, alert, oriented, in no acute distress, nontoxic appearing  HEENT: Pupils are equally round and reactive to light, EOMI, conjunctivae clear. Oral mucosa is moist, no exudate. Uvula is midline  Neck: Neck is supple, full range of motion, trachea midline  Cardiac: Heart regular rate, rhythm, no murmurs, rubs, or gallops  Lungs: Lungs are clear to auscultation, there is no wheezing, rhonchi, or rales. There is no use of accessory muscles. Abdomen: Abdomen is soft, nontender, nondistended. There is no firm or pulsatile masses, no rebound rigidity or guarding. Musculoskeletal: 5 out of 5 strength in all 4 extremities as measured by patient is able to hold arms upright for a full 10 seconds without any drift. .  No focal muscle deficits are appreciated. I did appreciate however on handgrip right hand  does appear slightly weaker than the left. Repeat examination of handgrip is normal.  Neuro: Motor intact, sensory intact, level of consciousness is normal, cerebellar function is normal, reflexes are grossly normal.  NIH stroke scale score is 0. Repeat examination after CT scan was also performed an NIH stroke scale score was 0.   Dermatology: Skin is warm and dry  Psych: Mentation is grossly normal, cognition is grossly normal. Affect is appropriate. DIAGNOSTIC RESULTS     EKG: All EKG's are interpreted by the Emergency Department Physician who either signs or Co-signs this chart in the absenceof a cardiologist.    The EKG interpreted by me shows normal sinus rhythm rate of 57 Long CO interval normal QT interval normal axis normal ST segments. RADIOLOGY:   Non-plain film images such as CT, Ultrasound and MRI are read by the radiologist. Plain radiographic images are visualized and preliminarily interpreted by the emergency physician with the below findings:      ? Radiologist's Report Reviewed:  CT Head WO Contrast   Final Result   1. No acute intracranial abnormality.             ED BEDSIDE ULTRASOUND:   Performed by ED Physician - none    LABS:    I have reviewed and interpreted all of the currently available lab results from this visit (ifapplicable):  Results for orders placed or performed during the hospital encounter of 03/17/22   CBC with Auto Differential   Result Value Ref Range    WBC 6.5 4.0 - 11.0 K/uL    RBC 4.65 3.80 - 5.80 M/uL    Hemoglobin 13.7 11.5 - 16.5 g/dL    Hematocrit 43.1 37.0 - 47.0 %    MCV 92.7 80.0 - 100.0 fL    MCH 29.5 27.0 - 32.0 pg    MCHC 31.8 31.0 - 35.0 g/dL    RDW 13.3 11.0 - 16.0 %    Platelets 857 472 - 434 K/uL    MPV 9.7 6.0 - 10.0 fL    Neutrophils % 56.6 %    Immature Granulocytes % 0.3 0.0 - 5.0 %    Lymphocytes % 30.3 %    Monocytes % 9.4 %    Eosinophils % 2.9 %    Basophils % 0.5 %    Neutrophils Absolute 3.7 2.0 - 7.5 K/uL    Immature Granulocytes # 0.0 K/uL    Lymphocytes Absolute 2.0 1.5 - 4.0 K/uL    Monocytes Absolute 0.6 0.2 - 0.8 K/uL    Eosinophils Absolute 0.2 0.0 - 0.4 K/uL    Basophils Absolute 0.0 0.0 - 0.1 K/uL   Comprehensive Metabolic Panel   Result Value Ref Range    Sodium 140 136 - 145 mmol/L    Potassium 3.9 3.4 - 5.1 mmol/L    Chloride 103 98 - 107 mmol/L    CO2 26 20 - 30 mmol/L    Anion Gap 11 3 - 16    Glucose 99 74 - 106 mg/dL    BUN 14 6 - 20 mg/dL    CREATININE 0.9 0.4 - 1.2 mg/dL    GFR Non-African American >60 >59    GFR African American >59 >59    Calcium 9.0 8.5 - 10.5 mg/dL    Total Protein 7.2 6.4 - 8.3 g/dL    Albumin 4.0 3.4 - 4.8 g/dL    Albumin/Globulin Ratio 1.3 0.8 - 2.0    Total Bilirubin 0.4 0.3 - 1.2 mg/dL    Alkaline Phosphatase 103 (H) 25 - 100 U/L    ALT 15 4 - 36 U/L    AST 18 8 - 33 U/L    Globulin 3.2 Not Established g/dL   Troponin   Result Value Ref Range    Troponin <0.30 <0.30 ng/mL   APTT   Result Value Ref Range    aPTT 26.2 22.5 - 34.9 sec   Protime-INR   Result Value Ref Range    Protime 12.6 11.6 - 13.8 sec    INR 0.99 0.88 - 1.11   POCT Glucose   Result Value Ref Range    POC Glucose 106 74 - 106 mg/dl    Performed on ACCU-CHEK    POCT Glucose   Result Value Ref Range    Glucose 106 mg/dL    QC OK? yes         All other labs were within normal range or not returned as of this dictation. EMERGENCY DEPARTMENT COURSE and DIFFERENTIAL DIAGNOSIS/MDM:   Vitals:    Vitals:    03/17/22 1312 03/17/22 1327 03/17/22 1342 03/17/22 1415   BP: (!) 141/63 135/60 (!) 176/63 (!) 147/88   Pulse: (!) 45 (!) 46 51 56   Resp: 15 15 12 17   SpO2: 95% 95% 98% 97%   Weight:       Height:           MEDICATIONS ADMINISTERED IN ED:  Medications   hydrALAZINE (APRESOLINE) injection 10 mg (0 mg IntraVENous Held 3/17/22 1305)       NIH stroke scale is 0 all symptoms have resolved. Patient is not a candidate for TPA because her symptoms have resolved. Will admit for TIA. Spoke with Sheryle Moores who agreed to admission. Initially when I examined patient NIH stroke scale score was 0 but I did appreciate us tiny difference of handgrip I do not appreciate this upon reexamination. Patient's blood pressure was very elevated when she first got here is much better without treatment. I ordered hydralazine but patient did not receive this because her pressure improved without it.   Patient's current vital signs 147/88 sat of 100 heart rate of 57. Patient first came in blood pressure was 213/68 it then decreased to 180/66. Patient states she has a history of elevated blood pressures at times with most with high blood pressure is normal.  Laboratory studies were reviewed and are unremarkable. CT of the head is negative. Since patient's blood pressure was extremely elevated and patient had weakness involving the hand patient had hypertensive emergency. This required a comprehensive examination and physical as well as laboratory testing and CT. There was great clinical concern that this would cause a permanent neurological damage. Medication was ordered for this however patient's blood pressure went down on his own before medication was administered. The patient will follow-up with their PCP in 1-2 days for reevaluation. If the patient or family members have anyfurther concerns or any worsening symptoms they will return to the ED for reevaluation. CONSULTS:  None    PROCEDURES:  Procedures    CRITICAL CARE TIME    Total Critical Care time was 30 minutes, excluding separately reportable procedures. There was a high probability of clinically significant/life threatening deterioration in the patient's condition which required my urgent intervention. FINAL IMPRESSION      1. Transient ischemic attack    2. Hypertensive emergency          DISPOSITION/PLAN   DISPOSITION        PATIENT REFERRED TO:  No follow-up provider specified. DISCHARGE MEDICATIONS:  New Prescriptions    No medications on file       Comment: Please note this report has been produced using speech recognition software and may contain errorsrelated to that system including errors in grammar, punctuation, and spelling, as well as words and phrases that may be inappropriate. If there are any questions or concerns please feel free to contact the dictating providerfor clarification.     Jackelin Hyman, DO  Attending Emergency Physician              Tomasita Aschoff,   03/17/22 [id number]

## 2022-03-17 NOTE — PROGRESS NOTES
Physical Therapy    Facility/Department: [facility] MED SURG  Initial Assessment    NAME: Jackson Whitmore  : 1945  MRN: [id number]    Date of Service: 3/17/2022    Discharge Recommendations:  Home with assist PRN        Assessment   Assessment: Pt demonstrated strength, ROM, mobility, and balance WNL with no significant muscular or neurological deficits noted. Pt does not require skilled PT in the acute setting at this time. Prognosis: Excellent  Decision Making: Low Complexity  No Skilled PT: Independent with functional mobility   REQUIRES PT FOLLOW UP: No       Patient Diagnosis(es): The primary encounter diagnosis was Transient ischemic attack. A diagnosis of Hypertensive emergency was also pertinent to this visit. has a past medical history of Anxiety, Bradycardia, HTN (hypertension), Hypothyroid, and Osteoarthritis. has a past surgical history that includes Eye surgery. Restrictions   general precautions    Vision/Hearing  Vision: Impaired  Vision Exceptions: Wears glasses at all times  Hearing: Exceptions to [facility]  Hearing Exceptions:  (wears hearing aids occasionally, doesn't have them with her currently)       Subjective  General  Referring Practitioner: Simone Arteaga PA-C  Referral Date : 22  Subjective  Subjective: Pt reports having R hand numbness today and felt \"clumsy\", spilling items today. She had difficulty with writing and eating with R hand. Pt came to ER; BP was elevated. Pt was admitted for observation. She denies having any hand symptoms currently.   Pain Assessment  Pain Level: 4 (headache)       Orientation  Orientation  Overall Orientation Status: Within Normal Limits     Social/Functional History  Social/Functional History  Lives With: Alone  Type of Home: House  Home Layout: Two level,Able to Live on Main level with bedroom/bathroom  Home Access: Level entry (no steps at back door, 5 at front door with rail)  Bathroom Shower/Tub: Tub/Shower unit  Bathroom Toilet: Standard  Bathroom Equipment: Grab bars in shower  Bathroom Accessibility: Accessible  Home Equipment: [employer]  ADL Assistance: Independent  Homemaking Assistance: Independent  Homemaking Responsibilities: Yes  Ambulation Assistance: Independent  Transfer Assistance: Independent  Active : Yes  Leisure & Hobbies: enjoys working outside, [employer] yard yesterday    Objective     Observation/Palpation  Posture: Good  Observation: pt presents supine in bed, room air, NAD, agreeable to PT evaluation. Diadochokinesa tests negative. Finger to nose and heel to shin test symmetrical and WNL. AROM RLE (degrees)  RLE AROM: WNL  AROM LLE (degrees)  LLE AROM : WNL  AROM RUE (degrees)  RUE AROM : WNL  AROM LUE (degrees)  LUE AROM : WNL     Strength RLE  Strength RLE: WNL  Strength LLE  Strength LLE: WNL     Tone RLE  RLE Tone: Normotonic  Tone LLE  LLE Tone: Normotonic     Bed mobility  Rolling to Left: Independent  Supine to Sit: Independent  Sit to Supine: Independent  Scooting: Independent     Transfers  Sit to Stand: Independent  Stand to sit:  Independent     Ambulation  Ambulation?: Yes  Ambulation 1  Surface: level tile  Device: No Device  Assistance: Independent  Gait Deviations: None  Distance: 30 feet in room     Balance  Posture: Good  Sitting - Static: Good  Sitting - Dynamic: Good  Standing - Static: Good  Standing - Dynamic: Good  Comments: Pt able to stand with narrow stance with eyes closed x 10 seconds without increase in body sway        Plan   Safety Devices  Type of devices: Call light within reach,Left in bed      Therapy Time   Individual Concurrent Group Co-treatment   Time In 1606         Time Out 1620         Minutes 14                 Inderjit Diaz, PT

## 2022-03-17 NOTE — PLAN OF CARE
Problem: Musculor/Skeletal Functional Status  Goal: Highest potential functional level  Outcome: Ongoing  Goal: Absence of falls  Outcome: Ongoing

## 2022-03-17 NOTE — ED NOTES
Patient assigned to room 16 at this time, Loi Escobar will call back to let me know the nurse that will be taking this patient as soon as she can.      Carmen Park RN  03/17/22 3748

## 2022-03-17 NOTE — PROGRESS NOTES
Medication Reconciliation  Med rec was performed utilizing fill history from [facility] and pt. See notes below:      -Removed the following meds per fill history and per the pt. B Complex      Clonidine      Meloxicam    Changed Fish oil frequency from bid to daily per pt. Changed levothyroxine strength from 25mcg to 50mcg per fill history and pt. Added per pt:  Zinc    -Of note:     Pt stated that even though her Paroxetine script as written as twice daily, she only takes it once daily. Updated home med list to reflect that pt takes this med once daily. Pt stated that she takes Zinc otc daily at home. Pt didn't know the strength of it, thus did not add strength to home med list.  Pt also did not know the strength of the fish oil that she takes daily at home, thus did not add strength to home med list.    Lastly, the pt did not know the exact strength of the calcium that she takes at home, but stated that she takes \"one calcium max strength tablet by mouth twice daily. \"  Thus, added this med to home med list under nonformulary designation since it is unclear what strength an formulation pt is taking otc at home.         Lio Manning, PharmD

## 2022-03-17 NOTE — FLOWSHEET NOTE
03/17/22 1559   Discharge [address] Children;Family Members;[facility] [facility]   Current Services Prior To Admission None   Potential Assistance Needed N/A   Potential Assistance Purchasing Medications No   Type of Home Care Services None   Patient expects to be discharged to: [facility]   Expected Discharge Date 03/21/22   Follow Up Appointment: Best Day/Time    (Any)   Has no medical equipment. No known needs.

## 2022-03-18 ENCOUNTER — APPOINTMENT (OUTPATIENT)
Dept: CT IMAGING | Facility: HOSPITAL | Age: 77
DRG: 065 | End: 2022-03-18
Payer: MEDICARE

## 2022-03-18 ENCOUNTER — OUTSIDE FACILITY SERVICE (OUTPATIENT)
Dept: CARDIOLOGY | Facility: CLINIC | Age: 77
End: 2022-03-18

## 2022-03-18 ENCOUNTER — APPOINTMENT (OUTPATIENT)
Dept: MRI IMAGING | Facility: HOSPITAL | Age: 77
DRG: 065 | End: 2022-03-18
Payer: MEDICARE

## 2022-03-18 PROBLEM — E03.9 HYPOTHYROIDISM: Status: ACTIVE | Noted: 2022-03-18

## 2022-03-18 PROBLEM — I63.9 ACUTE CVA (CEREBROVASCULAR ACCIDENT) (HCC): Status: ACTIVE | Noted: 2022-03-18

## 2022-03-18 PROBLEM — I10 HTN (HYPERTENSION): Status: ACTIVE | Noted: 2022-03-18

## 2022-03-18 PROBLEM — I63.9 CVA (CEREBRAL VASCULAR ACCIDENT) (HCC): Status: ACTIVE | Noted: 2022-03-18

## 2022-03-18 PROBLEM — E78.5 HYPERLIPIDEMIA: Status: ACTIVE | Noted: 2022-03-18

## 2022-03-18 PROBLEM — R00.1 BRADYCARDIA: Status: ACTIVE | Noted: 2022-03-18

## 2022-03-18 LAB
A/G RATIO: 1.3 (ref 0.8–2)
ALBUMIN SERPL-MCNC: 3.9 G/DL (ref 3.4–4.8)
ALP BLD-CCNC: 104 U/L (ref 25–100)
ALT SERPL-CCNC: 15 U/L (ref 4–36)
ANION GAP SERPL CALCULATED.3IONS-SCNC: 11 MMOL/L (ref 3–16)
AST SERPL-CCNC: 21 U/L (ref 8–33)
BILIRUB SERPL-MCNC: 0.4 MG/DL (ref 0.3–1.2)
BUN BLDV-MCNC: 12 MG/DL (ref 6–20)
CALCIUM SERPL-MCNC: 9.3 MG/DL (ref 8.5–10.5)
CHLORIDE BLD-SCNC: 105 MMOL/L (ref 98–107)
CHOLESTEROL, TOTAL: 246 MG/DL (ref 0–200)
CO2: 26 MMOL/L (ref 20–30)
CREAT SERPL-MCNC: 0.9 MG/DL (ref 0.4–1.2)
GFR AFRICAN AMERICAN: >59
GFR NON-AFRICAN AMERICAN: >60
GLOBULIN: 3.1 G/DL
GLUCOSE BLD-MCNC: 100 MG/DL (ref 74–106)
HBA1C MFR BLD: 5.4 %
HCT VFR BLD CALC: 44.3 % (ref 37–47)
HDLC SERPL-MCNC: 70 MG/DL (ref 40–60)
HEMOGLOBIN: 13.7 G/DL (ref 11.5–16.5)
LDL CHOLESTEROL CALCULATED: 155 MG/DL
LV EF: 58 %
LVEF MODALITY: NORMAL
MCH RBC QN AUTO: 28.7 PG (ref 27–32)
MCHC RBC AUTO-ENTMCNC: 30.9 G/DL (ref 31–35)
MCV RBC AUTO: 92.7 FL (ref 80–100)
PDW BLD-RTO: 13.2 % (ref 11–16)
PLATELET # BLD: 285 K/UL (ref 150–400)
PMV BLD AUTO: 9.9 FL (ref 6–10)
POTASSIUM REFLEX MAGNESIUM: 4.3 MMOL/L (ref 3.4–5.1)
RBC # BLD: 4.78 M/UL (ref 3.8–5.8)
SODIUM BLD-SCNC: 142 MMOL/L (ref 136–145)
TOTAL PROTEIN: 7 G/DL (ref 6.4–8.3)
TRIGL SERPL-MCNC: 106 MG/DL (ref 0–249)
TSH SERPL DL<=0.05 MIU/L-ACNC: 4.25 UIU/ML (ref 0.27–4.2)
VLDLC SERPL CALC-MCNC: 21 MG/DL
WBC # BLD: 6.9 K/UL (ref 4–11)

## 2022-03-18 PROCEDURE — 2580000003 HC RX 258: Performed by: PHYSICIAN ASSISTANT

## 2022-03-18 PROCEDURE — 97530 THERAPEUTIC ACTIVITIES: CPT

## 2022-03-18 PROCEDURE — 6360000004 HC RX CONTRAST MEDICATION: Performed by: PHYSICIAN ASSISTANT

## 2022-03-18 PROCEDURE — A9579 GAD-BASE MR CONTRAST NOS,1ML: HCPCS | Performed by: INTERNAL MEDICINE

## 2022-03-18 PROCEDURE — 6370000000 HC RX 637 (ALT 250 FOR IP): Performed by: PHYSICIAN ASSISTANT

## 2022-03-18 PROCEDURE — 6360000004 HC RX CONTRAST MEDICATION: Performed by: INTERNAL MEDICINE

## 2022-03-18 PROCEDURE — 1200000000 HC SEMI PRIVATE

## 2022-03-18 PROCEDURE — 80053 COMPREHEN METABOLIC PANEL: CPT

## 2022-03-18 PROCEDURE — 85027 COMPLETE CBC AUTOMATED: CPT

## 2022-03-18 PROCEDURE — 70553 MRI BRAIN STEM W/O & W/DYE: CPT

## 2022-03-18 PROCEDURE — 93306 TTE W/DOPPLER COMPLETE: CPT

## 2022-03-18 PROCEDURE — 83036 HEMOGLOBIN GLYCOSYLATED A1C: CPT

## 2022-03-18 PROCEDURE — 70496 CT ANGIOGRAPHY HEAD: CPT

## 2022-03-18 PROCEDURE — 6360000002 HC RX W HCPCS: Performed by: PHYSICIAN ASSISTANT

## 2022-03-18 PROCEDURE — 97165 OT EVAL LOW COMPLEX 30 MIN: CPT

## 2022-03-18 PROCEDURE — 84443 ASSAY THYROID STIM HORMONE: CPT

## 2022-03-18 PROCEDURE — 99223 1ST HOSP IP/OBS HIGH 75: CPT | Performed by: INTERNAL MEDICINE

## 2022-03-18 PROCEDURE — 36415 COLL VENOUS BLD VENIPUNCTURE: CPT

## 2022-03-18 PROCEDURE — 92523 SPEECH SOUND LANG COMPREHEN: CPT

## 2022-03-18 PROCEDURE — 93306 TTE W/DOPPLER COMPLETE: CPT | Performed by: INTERNAL MEDICINE

## 2022-03-18 PROCEDURE — 80061 LIPID PANEL: CPT

## 2022-03-18 PROCEDURE — 70498 CT ANGIOGRAPHY NECK: CPT

## 2022-03-18 RX ORDER — CALCIUM CARBONATE 500(1250)
500 TABLET ORAL 2 TIMES DAILY
Status: DISCONTINUED | OUTPATIENT
Start: 2022-03-18 | End: 2022-03-19 | Stop reason: HOSPADM

## 2022-03-18 RX ORDER — FLUTICASONE PROPIONATE 50 MCG
1 SPRAY, SUSPENSION (ML) NASAL DAILY
Status: DISCONTINUED | OUTPATIENT
Start: 2022-03-18 | End: 2022-03-19 | Stop reason: HOSPADM

## 2022-03-18 RX ORDER — LEVOTHYROXINE SODIUM 0.05 MG/1
50 TABLET ORAL DAILY
COMMUNITY

## 2022-03-18 RX ORDER — IPRATROPIUM BROMIDE 42 UG/1
SPRAY, METERED NASAL
COMMUNITY

## 2022-03-18 RX ORDER — ZINC SULFATE 50(220)MG
50 CAPSULE ORAL DAILY
Status: DISCONTINUED | OUTPATIENT
Start: 2022-03-18 | End: 2022-03-19 | Stop reason: HOSPADM

## 2022-03-18 RX ORDER — OMEGA-3-ACID ETHYL ESTERS 1 G/1
1 CAPSULE, LIQUID FILLED ORAL DAILY
Status: DISCONTINUED | OUTPATIENT
Start: 2022-03-19 | End: 2022-03-19 | Stop reason: HOSPADM

## 2022-03-18 RX ORDER — LEVOTHYROXINE SODIUM 0.05 MG/1
50 TABLET ORAL DAILY
Status: DISCONTINUED | OUTPATIENT
Start: 2022-03-19 | End: 2022-03-19 | Stop reason: HOSPADM

## 2022-03-18 RX ORDER — CHLORAL HYDRATE 500 MG
1000 CAPSULE ORAL DAILY
Status: DISCONTINUED | OUTPATIENT
Start: 2022-03-19 | End: 2022-03-18 | Stop reason: SDUPTHER

## 2022-03-18 RX ORDER — AMLODIPINE BESYLATE 5 MG/1
5 TABLET ORAL DAILY
Status: DISCONTINUED | OUTPATIENT
Start: 2022-03-18 | End: 2022-03-19

## 2022-03-18 RX ORDER — ROSUVASTATIN CALCIUM 20 MG/1
20 TABLET, COATED ORAL NIGHTLY
Status: DISCONTINUED | OUTPATIENT
Start: 2022-03-18 | End: 2022-03-19 | Stop reason: HOSPADM

## 2022-03-18 RX ORDER — SODIUM CHLORIDE 9 MG/ML
INJECTION, SOLUTION INTRAVENOUS CONTINUOUS
Status: DISCONTINUED | OUTPATIENT
Start: 2022-03-18 | End: 2022-03-19

## 2022-03-18 RX ADMIN — CALCIUM 500 MG: 500 TABLET ORAL at 20:42

## 2022-03-18 RX ADMIN — LEVOTHYROXINE SODIUM 25 MCG: 25 TABLET ORAL at 05:51

## 2022-03-18 RX ADMIN — ROSUVASTATIN CALCIUM 20 MG: 20 TABLET, COATED ORAL at 20:42

## 2022-03-18 RX ADMIN — GADOTERIDOL 20 ML: 279.3 INJECTION, SOLUTION INTRAVENOUS at 09:00

## 2022-03-18 RX ADMIN — PAROXETINE HYDROCHLORIDE 10 MG: 20 TABLET, FILM COATED ORAL at 09:21

## 2022-03-18 RX ADMIN — FLUTICASONE PROPIONATE 1 SPRAY: 50 SPRAY, METERED NASAL at 15:08

## 2022-03-18 RX ADMIN — SODIUM CHLORIDE: 9 INJECTION, SOLUTION INTRAVENOUS at 16:17

## 2022-03-18 RX ADMIN — ZINC SULFATE 220 MG (50 MG) CAPSULE 50 MG: CAPSULE at 15:08

## 2022-03-18 RX ADMIN — ENOXAPARIN SODIUM 40 MG: 100 INJECTION SUBCUTANEOUS at 20:42

## 2022-03-18 RX ADMIN — ASPIRIN 81 MG: 81 TABLET, COATED ORAL at 09:22

## 2022-03-18 RX ADMIN — B-COMPLEX W/ C & FOLIC ACID TAB 1 TABLET: TAB at 09:21

## 2022-03-18 RX ADMIN — CALCIUM 500 MG: 500 TABLET ORAL at 09:22

## 2022-03-18 RX ADMIN — IOPAMIDOL 100 ML: 755 INJECTION, SOLUTION INTRAVENOUS at 13:37

## 2022-03-18 RX ADMIN — AMLODIPINE BESYLATE 5 MG: 5 TABLET ORAL at 15:55

## 2022-03-18 ASSESSMENT — PAIN SCALES - GENERAL: PAINLEVEL_OUTOF10: 0

## 2022-03-18 NOTE — PROGRESS NOTES
Occupational Therapy   Occupational Therapy Initial Assessment  Date: 3/18/2022   Patient Name: Ben Lehman  MRN: [id number]     : 1945    Date of Service: 3/18/2022    Discharge Recommendations:   Home independently       Assessment   Assessment: Pt agreeable to OT services. Pt high functional level. Pt independent with basic ADL's. Pt symptoms resolved at this time. Pt has AROM and MMT WFL of RUE. Pt coordination WFL. No skilled OT services warranted. Prognosis: Good  Decision Making: Low Complexity  No Skilled OT: Independent with ADL's;At baseline function; Safe to return home  REQUIRES OT FOLLOW UP: No  Activity Tolerance  Activity Tolerance: Patient Tolerated treatment well           Patient Diagnosis(es): The primary encounter diagnosis was Transient ischemic attack. A diagnosis of Hypertensive emergency was also pertinent to this visit. has a past medical history of Anxiety, Bradycardia, HTN (hypertension), Hypothyroid, and Osteoarthritis. has a past surgical history that includes Eye surgery. Restrictions  Restrictions/Precautions  Restrictions/Precautions: General Precautions  Required Braces or Orthoses?: No    Subjective   General  Chart Reviewed: Yes  Patient assessed for rehabilitation services?: Yes  Family / Caregiver Present: No  Referring Practitioner: Cheryl Leonardo PA-C  Diagnosis: Right hand weakness  Subjective  Subjective: Pt agreeable to OT services. Pt states she was having R hand weakness numbness in R hand and dropping objects.   Patient Currently in Pain: Denies  Vital Signs  Patient Currently in Pain: Denies  Social/Functional History  Social/Functional History  Lives With: Alone  Type of Home: House  Home Layout: Two level,Able to Live on Main level with bedroom/bathroom  Home Access: Level entry  Bathroom Shower/Tub: Tub/Shower unit  [place] Toilet: Standard  Bathroom Equipment: Grab bars in shower  Bathroom Accessibility: Accessible  Home Equipment: Cane  ADL Assistance: Independent  Homemaking Assistance: Independent  Homemaking Responsibilities: Yes  Ambulation Assistance: Independent  Transfer Assistance: Independent  Active : Yes  Leisure & Hobbies: enjoys working outside, [employer] yard yesterday       Objective   Vision: Impaired  Vision Exceptions: Wears glasses at all times  Hearing: Exceptions to [facility]  Hearing Exceptions: Hard of hearing/hearing concerns    Orientation  Overall Orientation Status: Within Functional Limits  Observation/Palpation  Observation: pt presents supine in bed, room air, NAD,     ADL  LE Dressing: Independent  Toileting: Independent        Bed mobility  Rolling to Left: Independent  Supine to Sit: Independent  Sit to Supine: Independent  Scooting: Independent                 Sensation  Overall Sensation Status: WFL        LUE PROM (degrees)  LUE General PROM: Limited ~90 degrees shoulder flexion  RUE AROM (degrees)  RUE AROM : WFL  LUE Strength  L Shoulder Flex: 3-/5  L Elbow Flex: 4+/5  L Elbow Ext: 4+/5  L Hand General: 4+/5  RUE Strength  R Shoulder Flex: 4+/5  R Elbow Flex: 4+/5  R Elbow Ext: 4+/5  R Hand General: 4+/5  \  Therapy Time   Individual Concurrent Group Co-treatment   Time In 0935         Time Out           Minutes                This note serves as a DC summary in the event of pt discharge.      Wendi Humphries OTR/L

## 2022-03-18 NOTE — H&P
History and Physical    Patient:  Megan Kc    CHIEF COMPLAINT:    Right hand weakness    HISTORY OF PRESENT ILLNESS:   The patient is a 67 y.o. right hand dominant female with PMH of anxiety, bradycardia, HTN, hypothyroidism and OA who presented to the ER with complaints of right hand weakness. Patient states she awoke in her normal state of health yesterday morning. Around 10:30 AM, she noted weakness to her right hand. This prohibited her from holding her spoon, fork or objects without dropping them. She also reports difficulty writing. Symptoms resolved within 1 hour. She denied any difficulty speaking, walking or swallowing. Pt reports another episode of near syncope and lightheadedness approximately 2 weeks ago after cleaning out her car. Patient states that she was very hot and felt like she was having a possible heat stroke. She took her blood pressure at home and found it to be elevated at that time. Upon arrival to the emergency department yesterday, patient's symptoms had resolved. Vitals: Blood pressure 213/68, pulse 55, respirations 18, temperature 97.9, oxygen sat 98% on room air. Labs essentially unremarkable. Troponin negative. CT head negative. NIHSS on admission 0. Patient was given aspirin and statin. She was then admitted for further care. Past Medical History:      Diagnosis Date    Anxiety     Bradycardia     HTN (hypertension)     Hypothyroid     Osteoarthritis        Past Surgical History:      Procedure Laterality Date    EYE SURGERY         Medications Prior to Admission:    Prior to Admission medications    Medication Sig Start Date End Date Taking?  Authorizing Provider   levothyroxine (SYNTHROID) 50 MCG tablet Take 50 mcg by mouth Daily   Yes Historical Provider, MD   ipratropium (ATROVENT) 0.06 % nasal spray Inhale 1-2 sprays in each nostril twice daily   Yes Historical Provider, MD   NONFORMULARY Calcium maximum strength- Take one tablet by mouth twice daily   Yes Historical Provider, MD   ZINC SULFATE PO Take 1 tablet by mouth daily    Yes Historical Provider, MD   PARoxetine (PAXIL) 10 MG tablet Take 10 mg by mouth every morning    Historical Provider, MD   Omega-3 Fatty Acids (FISH OIL PO) Take 1 capsule by mouth daily     Historical Provider, MD   aspirin EC 81 MG EC tablet Take 81 mg by mouth daily     Historical Provider, MD       Allergies:  Patient has no known allergies. Social History:   TOBACCO:   reports that she has never smoked. She has never used smokeless tobacco.  ETOH:   reports no history of alcohol use. OCCUPATION:  None     Family History:       Problem Relation Age of Onset    Heart Disease Mother     Heart Disease Father        Review of system  Constitutional:  Denies fever or chills   Eyes:  Denies eye pain or redness  HENT:  Denies nasal congestion or sore throat   Respiratory:  Denies cough or shortness of breath   Cardiovascular:  Denies chest pain or edema   GI:  Denies abdominal pain, nausea, vomiting, bloody stools or diarrhea   :  Denies dysuria or frequency  Musculoskeletal:  Denies acute neck pain or body aches  Integument:  Denies rash or itching  Neurologic:  Denies headache, dizziness, tingling. Positive for right hand weakness. Psychiatric:  Denies acute depression or acute anxiety      Vital Signs  Temp: 97.7 °F (36.5 °C)  Pulse: (!) 42  Resp: 18  BP: (!) 165/63  SpO2: 97 %  O2 Device: None (Room air)       vital signs reviewed in electronic chart. Physical exam  Constitutional:  Well developed, well nourished, elderly female sitting upright in bed in no acute distress  Eyes:  no scleral icterus, conjunctiva normal   HENT:  Atraumatic, external ears normal, nose normal, oropharynx moist, no pharyngeal exudates.  Neck- supple, no JVD, no lymphadenopathy  Respiratory:  No respiratory distress, no wheezing, rales or rhonchi detected  Cardiovascular:  Normal rate, normal rhythm, no murmurs, no gallops, no rubs, no edema   GI:  Soft, nondistended, normal bowel sounds, nontender, no voluntary guarding  Musculoskeletal:  No cyanosis or obvious acute deformity. Moving all extremities   Integument:  Warm and dry. Lymphatic:  No cervical or axillary lymphadenopathy noted   Neurologic:  Alert & oriented x 3, no apparent focal deficits noted. 5/5 strength in BUE and hands. Normal gait. Psychiatric:  Speech and behavior appropriate         Lab Results   Component Value Date     03/18/2022    K 4.3 03/18/2022     03/18/2022    CO2 26 03/18/2022    BUN 12 03/18/2022    CREATININE 0.9 03/18/2022    GLUCOSE 100 03/18/2022    CALCIUM 9.3 03/18/2022    PROT 7.0 03/18/2022    LABALBU 3.9 03/18/2022    BILITOT 0.4 03/18/2022    ALKPHOS 104 (H) 03/18/2022    AST 21 03/18/2022    ALT 15 03/18/2022    LABGLOM >60 03/18/2022    GFRAA >59 03/18/2022    AGRATIO 1.3 03/18/2022    GLOB 3.1 03/18/2022           Lab Results   Component Value Date    WBC 6.9 03/18/2022    HGB 13.7 03/18/2022    HCT 44.3 03/18/2022    MCV 92.7 03/18/2022     03/18/2022       PA/lat CXR:   MRI BRAIN W WO CONTRAST   Final Result      1. Acute cortical infarction 8 x 4 mm involving the left precentral gyrus     2. Progression of mild cerebral white matter disease, most likely sequela chronic small vessel ischemia. Differential diagnosis also includes but is not limited to: demyelinating disease including multiple sclerosis and Lyme disease, migraine headache    effect, vasculitis, trauma and gliosis. CT Head WO Contrast   Final Result   1. No acute intracranial abnormality.       US CAROTID ARTERY BILATERAL    (Results Pending)         EKG: NSR, rate 57 bpm    Assessment and Plan     Active Hospital Problems    Diagnosis Date Noted    Acute CVA (cerebrovascular accident) ([facility] [address] [I63.9]  -NIHSS on admission 0  -CT Head 3/17: negative  -MRI brain w/wo contrast 3/18: (1) acute cortical infarction 8 x 4 mm involving the left precentral gyrus.  (2) progression of mild cerebral white matter disease, most likely sequela of chronic small vessel ischemia. Differential diagnosis also includes but is not limited to: Demyelinating disease including multiple sclerosis and Lyme disease, migraine headache effect, vasculitis, trauma and gliosis. -  -A1c 5.4  -CTA H/N ordered  -echo ordered  -NSR on telemetry although HR as low as 42. Will continue to monitor for another 24 hours  -pt previously taking ASA 81mg PTA; increased to 325 daily and added crestor 20mg daily  -discussed case with [facility] Stroke Team 3/18. Check CTA H/N as above. If pt needs intervention, call back for transfer. If not, can follow up as OP.   -NIHSS remains 0  -PT OT consulted  -SLP consulted  -CM consulted for dc planning assistance  -permissive HTN in setting of recent stroke   03/18/2022    Bradycardia [R00.1]  -chronic condition. EKG with NSR. HR documented as low as 42 here. Will continue to monitor on tele another 24 hours. Will likely arrange HM on discharge. -TSH 4.25   03/18/2022    HTN (hypertension) [I10]  -with HTN urgency on admission (due to CVA) with /68  -Blood pressure has improved since admission and is currently 165/63  -As above, permissive hypertensive in setting of recent stroke. If blood pressure remains elevated with SBP greater than 160 over the next 24 hours, will consider addition of antihypertensive agent. 03/18/2022    Hyperlipidemia [E78.5]  -total cholesterol 246, HDL 70, , VLDL 21 -->add crestor   03/18/2022    Hypothyroidism [E03.9]  -TSH 4.25  -continue synthroid 03/18/2022     Patient was seen and examined by Dr. Lopez Cazares and plan of care reviewed. Treatment plan was formulated collaboratively.       TILA Perez certifies per CMS regulation for 42 .15(a), that the patient may reasonably be expected to be discharged or transferred to a hospital within 96 hours after admission to [facility] and [facility]    Electronically signed by TILA Silvestre on 3/18/2022 at 11:13 AM

## 2022-03-18 NOTE — PROGRESS NOTES
Speech Language Pathology  Facility/Department: [facility] MED SURG  Initial Speech/Language/Cognitive Assessment    NAME: Carleen Harris  : 1945   MRN: [id number]  ADMISSION DATE: 3/17/2022  ADMITTING DIAGNOSIS: has Right hand weakness; CVA (cerebral vascular accident) ([facility] [address] and Acute CVA (cerebrovascular accident) ([facility] [address] on their problem list.  DATE ONSET: 3/17/2022    Date of Eval: 3/18/2022   Evaluating Therapist: BEA Damon    RECENT RESULTS  CT OF HEAD/MRI:  Impression       1. Acute cortical infarction 8 x 4 mm involving the left precentral gyrus     2. Progression of mild cerebral white matter disease, most likely sequela chronic small vessel ischemia. Differential diagnosis also includes but is not limited to: demyelinating disease including multiple sclerosis and Lyme disease, migraine headache    effect, vasculitis, trauma and gliosis. Primary Complaint:   No complaints    Pain:  Pain Assessment  Pain Assessment: 0-10  Pain Level: 4 (headache)    Assessment:  Cognitive Diagnosis: Patient presents with Cognitive Functions -WNL  Aphasia Diagnosis: N/A  Speech Diagnosis: N/A  Communication Diagnosis: N/A   Diagnosis: Patient referred to ST services due to Acute CVA, patient assessed with Mini-Mental Examination (MMSE) with a score of 30/30, patient oriented x 4 , STM and LTM -WFL, patient  presents with adequate problem solving skills and safety awareness.  Patient presents cognitive-linguistic skills-WFL    Recommendations:  Requires SLP Intervention: No  Duration/Frequency of Treatment: ST eval only  D/C Recommendations: Home independently    Plan:   Goals:  Short-term Goals  Timeframe for Short-term Goals: N/A  Goal 1: ST eval only  Long-term Goals  Timeframe for Long-term Goals: N/A  Goal 1: ST eval only   Patient/family involved in developing goals and treatment plan:N/A    Subjective:   Previous level of function and limitations: None   General  Chart Reviewed: Yes  Patient assessed for rehabilitation services?: Yes  Additional Pertinent Hx: HTN, Bradycardia, CVA  Family / Caregiver Present: No  General Comment  Comments: Patient oriented x 4, speech clear, communicates wants/needs  Subjective  Subjective: Patient pleasant and cooperative     Vision  Vision: Impaired  Vision Exceptions: Wears glasses at all times  Hearing  Hearing: Within functional limits        Objective:     Oral/Motor  Oral Motor: Within functional limits    Auditory Comprehension  Comprehension: Within Functional Limits         Expression  Primary Mode of Expression: Verbal    Verbal Expression  Verbal Expression: Within functional limits    Motor Speech  Motor Speech:  Within Functional Limits    Pragmatics/Social Functioning  Pragmatics: Within functional limits    Cognition:      Orientation  Overall Orientation Status: Within Normal Limits  Attention  Attention: Within Functional Limits    Prognosis:  Speech Therapy Prognosis  Prognosis: Excellent  Individuals consulted  Consulted and agree with results and recommendations: Patient    Education:  Patient Education: Patient education with results of this evaluation  Patient Education Response: Verbalizes understanding  Safety Devices in place: Yes  Type of devices: Call light within reach    Therapy Time:   Individual Concurrent Group Co-treatment   Time In 1114         Time Out 11:46         Minutes [address] [place], [place]  3/18/2022 11:40 AM

## 2022-03-18 NOTE — FLOWSHEET NOTE
Pt returned from MRI and resting quietly in bed. Am assessment complete, respirations equal and unlabored. Pt took am medications without difficulty. Pt instructed to call out with any needs or concerns, none at this time. Reviewed plan of care with pt, verbalized understanding. Call bell within pt reach.      03/18/22 0985   Assessment   Charting Type Shift assessment   Neurological   Neuro (WDL) WDL   Level of Consciousness Alert (0)   Orientation Level Oriented X4   Cognition Appropriate judgement; Follows commands   Language Clear   [place] Coma Scale   Eye Opening 4   Best Verbal Response 5   Best Motor Response 6   German Coma Scale Score 15   NIHSS Stroke Scale   NIHSS Stroke Scale Assessed Yes   Interval Reassessment   Level of Consciousness (1a) 0   LOC Questions (1b) 0   LOC Commands (1c) 0   Best Gaze (2) 0   Visual (3) 0   Facial Palsy (4) 0   Motor Arm, Left (5a) 0   Motor Arm, Right (5b) 0   Motor Leg, Left (6a) 0   Motor Leg, Right (6b) 0   Limb Ataxia (7) 0   Sensory (8) 0   Best Language (9) 0   Dysarthria (10) 0   Extinction and Inattention (11) 0   Total 0   HEENT   HEENT (WDL) X   Right Ear Impaired hearing   Left Ear Impaired hearing   Teeth Dentures upper;Dentures lower   Respiratory   Respiratory (WDL) WDL   Cardiac   Cardiac (WDL) WDL   Gastrointestinal   Abdominal (WDL) WDL   Peripheral Vascular   Peripheral Vascular (WDL) WDL   RUE Neurovascular Assessment   Capillary Refill Less than/equal to 3 seconds   Color Appropriate for ethnicity   Temperature Warm   Sensation RUE Full sensation   R Radial Pulse +2   LUE Neurovascular Assessment   Capillary Refill Less than/equal to 3 seconds   Color Appropriate for ethnicity   Temperature Warm   Sensation LUE Full sensation   L Radial Pulse +2   RLE Neurovascular Assessment   Capillary Refill Less than/equal to 3 seconds   Color Pink   Temperature Warm   R Pedal Pulse +1   LLE Neurovascular Assessment   Capillary Refill Less than/equal to 3 seconds Color Pink   Temperature Warm   L Pedal Pulse +1   Skin Color/Condition   Skin Color/Condition (WDL) WDL   Skin Integrity   Skin Integrity (WDL) WDL   Musculoskeletal   Musculoskeletal (WDL) WDL   Genitourinary   Genitourinary (WDL) WDL   Urine Assessment   Incontinence No   Psychosocial   Psychosocial (WDL) WDL

## 2022-03-18 NOTE — PLAN OF CARE
Problem: Musculor/Skeletal Functional Status  Goal: Highest potential functional level  3/18/2022 1012 by Que Be RN  Outcome: Ongoing  3/18/2022 0217 by Lety Gotti RN  Outcome: Ongoing  Goal: Absence of falls  3/18/2022 1012 by Que Be RN  Outcome: Ongoing  3/18/2022 0217 by Lety Gotti RN  Outcome: Ongoing     Problem: Neurological  Goal: Maximum potential motor/sensory/cognitive function  Outcome: Ongoing     Problem: Cardiovascular  Goal: No DVT, peripheral vascular complications  Outcome: Ongoing  Goal: Hemodynamic stability  Outcome: Ongoing     Problem: Respiratory  Goal: No pulmonary complications  Outcome: Ongoing  Goal: O2 Sat > 90%  Outcome: Ongoing     Problem: GI  Goal: No bowel complications  Outcome: Ongoing  Goal: Bowel movement at least every other day  Outcome: Ongoing

## 2022-03-18 NOTE — FLOWSHEET NOTE
03/17/22 2040   Assessment   Charting Type Shift assessment   Neurological   Neuro (WDL) WDL   Level of Consciousness Alert (0)   Orientation Level Oriented X4   Cognition Appropriate judgement; Follows commands   Language Clear   HEENT   HEENT (WDL) X   Right Ear Impaired hearing   Left Ear Impaired hearing   Teeth Dentures upper;Dentures lower   Respiratory   Respiratory (WDL) WDL   Cardiac   Cardiac (WDL) WDL   Gastrointestinal   Abdominal (WDL) WDL   Peripheral Vascular   Peripheral Vascular (WDL) X   RLE Neurovascular Assessment   Capillary Refill Less than/equal to 3 seconds   Color Pink   Temperature Warm   R Pedal Pulse +1   LLE Neurovascular Assessment   Capillary Refill Less than/equal to 3 seconds   Color Pink   Temperature Warm   L Pedal Pulse +1   Skin Color/Condition   Skin Color/Condition (WDL) WDL   Skin Integrity   Skin Integrity (WDL) WDL   Musculoskeletal   Musculoskeletal (WDL) WDL   Genitourinary   Genitourinary (WDL) WDL   Psychosocial   Psychosocial (WDL) WDL

## 2022-03-19 ENCOUNTER — HOSPITAL ENCOUNTER (OUTPATIENT)
Facility: HOSPITAL | Age: 77
Setting detail: OBSERVATION
Discharge: HOME | End: 2022-03-20
Attending: FAMILY MEDICINE | Admitting: STUDENT IN AN ORGANIZED HEALTH CARE EDUCATION/TRAINING PROGRAM

## 2022-03-19 VITALS
SYSTOLIC BLOOD PRESSURE: 142 MMHG | WEIGHT: 160 LBS | HEIGHT: 63 IN | DIASTOLIC BLOOD PRESSURE: 77 MMHG | OXYGEN SATURATION: 94 % | HEART RATE: 84 BPM | BODY MASS INDEX: 28.35 KG/M2 | TEMPERATURE: 98.1 F | RESPIRATION RATE: 18 BRPM

## 2022-03-19 PROBLEM — Z85.3 HISTORY OF BREAST CANCER: Status: ACTIVE | Noted: 2022-03-19

## 2022-03-19 PROBLEM — Z86.73 RECENT CEREBROVASCULAR ACCIDENT (CVA): Status: ACTIVE | Noted: 2022-03-19

## 2022-03-19 PROBLEM — I50.32 DIASTOLIC DYSFUNCTION WITH CHRONIC HEART FAILURE: Status: ACTIVE | Noted: 2022-03-19

## 2022-03-19 PROBLEM — I48.91 NEW ONSET ATRIAL FIBRILLATION: Status: ACTIVE | Noted: 2022-03-19

## 2022-03-19 PROBLEM — I48.91 ATRIAL FIBRILLATION WITH RVR (HCC): Status: ACTIVE | Noted: 2022-03-19

## 2022-03-19 LAB
A/G RATIO: 1.3 (ref 0.8–2)
ALBUMIN SERPL-MCNC: 3.5 G/DL (ref 3.4–4.8)
ALP BLD-CCNC: 104 U/L (ref 25–100)
ALT SERPL-CCNC: 14 U/L (ref 4–36)
ANION GAP SERPL CALCULATED.3IONS-SCNC: 11 MMOL/L (ref 3–16)
AST SERPL-CCNC: 18 U/L (ref 8–33)
BILIRUB SERPL-MCNC: <0.2 MG/DL (ref 0.3–1.2)
BUN BLDV-MCNC: 9 MG/DL (ref 6–20)
CALCIUM SERPL-MCNC: 8.6 MG/DL (ref 8.5–10.5)
CHLORIDE BLD-SCNC: 110 MMOL/L (ref 98–107)
CO2: 23 MMOL/L (ref 20–30)
CREAT SERPL-MCNC: 0.8 MG/DL (ref 0.4–1.2)
GFR AFRICAN AMERICAN: >59
GFR NON-AFRICAN AMERICAN: >60
GLOBULIN: 2.8 G/DL
GLUCOSE BLD-MCNC: 104 MG/DL (ref 74–106)
HCT VFR BLD CALC: 39.9 % (ref 37–47)
HEMOGLOBIN: 12.8 G/DL (ref 11.5–16.5)
MAGNESIUM: 1.8 MG/DL (ref 1.7–2.4)
MCH RBC QN AUTO: 29.2 PG (ref 27–32)
MCHC RBC AUTO-ENTMCNC: 32.1 G/DL (ref 31–35)
MCV RBC AUTO: 90.9 FL (ref 80–100)
PDW BLD-RTO: 13.2 % (ref 11–16)
PHOSPHORUS: 3.4 MG/DL (ref 2.5–4.5)
PLATELET # BLD: 266 K/UL (ref 150–400)
PMV BLD AUTO: 9.8 FL (ref 6–10)
POTASSIUM SERPL-SCNC: 3.7 MMOL/L (ref 3.4–5.1)
RBC # BLD: 4.39 M/UL (ref 3.8–5.8)
SODIUM BLD-SCNC: 144 MMOL/L (ref 136–145)
TOTAL PROTEIN: 6.3 G/DL (ref 6.4–8.3)
TROPONIN: <0.3 NG/ML
WBC # BLD: 6.3 K/UL (ref 4–11)

## 2022-03-19 PROCEDURE — 93005 ELECTROCARDIOGRAM TRACING: CPT

## 2022-03-19 PROCEDURE — 2500000003 HC RX 250 WO HCPCS: Performed by: PHYSICIAN ASSISTANT

## 2022-03-19 PROCEDURE — 80053 COMPREHEN METABOLIC PANEL: CPT

## 2022-03-19 PROCEDURE — 85027 COMPLETE CBC AUTOMATED: CPT

## 2022-03-19 PROCEDURE — 36415 COLL VENOUS BLD VENIPUNCTURE: CPT

## 2022-03-19 PROCEDURE — 2580000003 HC RX 258: Performed by: PHYSICIAN ASSISTANT

## 2022-03-19 PROCEDURE — 84100 ASSAY OF PHOSPHORUS: CPT

## 2022-03-19 PROCEDURE — 99219 PR INITIAL OBSERVATION CARE/DAY 50 MINUTES: CPT | Performed by: NURSE PRACTITIONER

## 2022-03-19 PROCEDURE — 99239 HOSP IP/OBS DSCHRG MGMT >30: CPT | Performed by: INTERNAL MEDICINE

## 2022-03-19 PROCEDURE — G0378 HOSPITAL OBSERVATION PER HR: HCPCS

## 2022-03-19 PROCEDURE — 6360000002 HC RX W HCPCS: Performed by: PHYSICIAN ASSISTANT

## 2022-03-19 PROCEDURE — 83735 ASSAY OF MAGNESIUM: CPT

## 2022-03-19 PROCEDURE — 93005 ELECTROCARDIOGRAM TRACING: CPT | Performed by: NURSE PRACTITIONER

## 2022-03-19 PROCEDURE — 84484 ASSAY OF TROPONIN QUANT: CPT

## 2022-03-19 PROCEDURE — G0379 DIRECT REFER HOSPITAL OBSERV: HCPCS

## 2022-03-19 PROCEDURE — 6370000000 HC RX 637 (ALT 250 FOR IP): Performed by: PHYSICIAN ASSISTANT

## 2022-03-19 RX ORDER — FLUTICASONE PROPIONATE 50 MCG
2 SPRAY, SUSPENSION (ML) NASAL DAILY
Status: DISCONTINUED | OUTPATIENT
Start: 2022-03-19 | End: 2022-03-20 | Stop reason: HOSPADM

## 2022-03-19 RX ORDER — METOPROLOL TARTRATE 5 MG/5ML
5 INJECTION INTRAVENOUS EVERY 6 HOURS
Status: DISCONTINUED | OUTPATIENT
Start: 2022-03-19 | End: 2022-03-19 | Stop reason: HOSPADM

## 2022-03-19 RX ORDER — ONDANSETRON 2 MG/ML
4 INJECTION INTRAMUSCULAR; INTRAVENOUS EVERY 6 HOURS PRN
Status: DISCONTINUED | OUTPATIENT
Start: 2022-03-19 | End: 2022-03-20 | Stop reason: HOSPADM

## 2022-03-19 RX ORDER — FLUTICASONE PROPIONATE 50 MCG
1 SPRAY, SUSPENSION (ML) NASAL DAILY
Qty: 16 G | Refills: 3 | Status: SHIPPED | OUTPATIENT
Start: 2022-03-20

## 2022-03-19 RX ORDER — CALCIUM CARBONATE 500(1250)
500 TABLET ORAL 2 TIMES DAILY
Qty: 30 TABLET | Refills: 3 | Status: SHIPPED | OUTPATIENT
Start: 2022-03-19

## 2022-03-19 RX ORDER — ACETAMINOPHEN 325 MG/1
650 TABLET ORAL EVERY 4 HOURS PRN
Status: DISCONTINUED | OUTPATIENT
Start: 2022-03-19 | End: 2022-03-20 | Stop reason: HOSPADM

## 2022-03-19 RX ORDER — LEVOTHYROXINE SODIUM 0.05 MG/1
50 TABLET ORAL
Status: DISCONTINUED | OUTPATIENT
Start: 2022-03-20 | End: 2022-03-20 | Stop reason: HOSPADM

## 2022-03-19 RX ORDER — PAROXETINE HYDROCHLORIDE 20 MG/1
10 TABLET, FILM COATED ORAL EVERY MORNING
Status: DISCONTINUED | OUTPATIENT
Start: 2022-03-20 | End: 2022-03-20 | Stop reason: HOSPADM

## 2022-03-19 RX ORDER — SODIUM CHLORIDE 0.9 % (FLUSH) 0.9 %
10 SYRINGE (ML) INJECTION AS NEEDED
Status: DISCONTINUED | OUTPATIENT
Start: 2022-03-19 | End: 2022-03-20 | Stop reason: HOSPADM

## 2022-03-19 RX ORDER — METOPROLOL TARTRATE 5 MG/5ML
5 INJECTION INTRAVENOUS EVERY 6 HOURS
Qty: 1 ML | Refills: 0 | Status: SHIPPED | OUTPATIENT
Start: 2022-03-19

## 2022-03-19 RX ORDER — ONDANSETRON 4 MG/1
4 TABLET, FILM COATED ORAL EVERY 6 HOURS PRN
Status: DISCONTINUED | OUTPATIENT
Start: 2022-03-19 | End: 2022-03-20 | Stop reason: HOSPADM

## 2022-03-19 RX ORDER — ROSUVASTATIN CALCIUM 20 MG/1
20 TABLET, COATED ORAL NIGHTLY
Qty: 30 TABLET | Refills: 3 | Status: SHIPPED | OUTPATIENT
Start: 2022-03-19

## 2022-03-19 RX ORDER — SODIUM CHLORIDE 0.9 % (FLUSH) 0.9 %
10 SYRINGE (ML) INJECTION EVERY 12 HOURS SCHEDULED
Status: DISCONTINUED | OUTPATIENT
Start: 2022-03-19 | End: 2022-03-20 | Stop reason: HOSPADM

## 2022-03-19 RX ADMIN — ENOXAPARIN SODIUM 70 MG: 100 INJECTION SUBCUTANEOUS at 10:25

## 2022-03-19 RX ADMIN — CALCIUM 500 MG: 500 TABLET ORAL at 09:13

## 2022-03-19 RX ADMIN — OMEGA-3-ACID ETHYL ESTERS 1 G: 1 CAPSULE, LIQUID FILLED ORAL at 09:13

## 2022-03-19 RX ADMIN — LEVOTHYROXINE SODIUM 50 MCG: 0.05 TABLET ORAL at 06:06

## 2022-03-19 RX ADMIN — METOPROLOL TARTRATE 5 MG: 1 INJECTION, SOLUTION INTRAVENOUS at 10:25

## 2022-03-19 RX ADMIN — FLUTICASONE PROPIONATE 1 SPRAY: 50 SPRAY, METERED NASAL at 10:26

## 2022-03-19 RX ADMIN — Medication 10 ML: at 14:45

## 2022-03-19 RX ADMIN — Medication 10 ML: at 21:03

## 2022-03-19 RX ADMIN — PAROXETINE HYDROCHLORIDE 10 MG: 20 TABLET, FILM COATED ORAL at 09:13

## 2022-03-19 RX ADMIN — AMLODIPINE BESYLATE 5 MG: 5 TABLET ORAL at 09:13

## 2022-03-19 RX ADMIN — ZINC SULFATE 220 MG (50 MG) CAPSULE 50 MG: CAPSULE at 09:13

## 2022-03-19 RX ADMIN — ASPIRIN 325 MG: 325 TABLET, COATED ORAL at 09:13

## 2022-03-19 RX ADMIN — APIXABAN 5 MG: 5 TABLET, FILM COATED ORAL at 21:03

## 2022-03-19 RX ADMIN — SODIUM CHLORIDE 1000 ML: 9 INJECTION, SOLUTION INTRAVENOUS at 02:39

## 2022-03-19 NOTE — FLOWSHEET NOTE
03/19/22 0302   Cardiac   Cardiac (WDL) X   Cardiac Regularity Irregular   Cardiac Rhythm A flutter  (rate 64)       Dr. Margaret Florentino made aware pt ambulated to the bathroom and HR increased to 130's. Cardiac monitor showed afib. Pt ambulated back to bed and EKG performed. EKG showed aflutter with variable AV block, HR 72., vitals stable and reported to MD, see below. Pt asymptomatic. Dr. Margaret Florentino decreased pt's fluids to 50ml/hr and ordered additional am labs. See orders.       03/19/22 0251   Vital Signs   Temp 98.3 °F (36.8 °C)   Temp Source Oral   Pulse 68   Heart Rate Source Monitor   Resp 16   BP (!) 140/78   BP Location Right upper arm   MAP (mmHg) 99   Oxygen Therapy   SpO2 95 %   O2 Device None (Room air)

## 2022-03-19 NOTE — PLAN OF CARE
Problem: Musculor/Skeletal Functional Status  Goal: Highest potential functional level  3/18/2022 2202 by Frank Chery RN  Outcome: Met This Shift     Problem: Neurological  Goal: Maximum potential motor/sensory/cognitive function  3/18/2022 2202 by Frank Chery RN  Outcome: Met This Shift     Problem: Cardiovascular  Goal: No DVT, peripheral vascular complications  5/28/7304 2202 by Frank Chery RN  Outcome: Ongoing     Problem: Cardiovascular  Goal: Hemodynamic stability  3/18/2022 2202 by Frank Chery RN  Outcome: Ongoing     Problem: Respiratory  Goal: No pulmonary complications  7/16/9901 2202 by Frank Chery RN  Outcome: Met This Shift     Problem: GI  Goal: No bowel complications  0/85/8709 2202 by Frank Chery RN  Outcome: Ongoing     Problem:   Goal: Adequate urinary output  Outcome: Met This Shift     Problem: Nutrition  Goal: Optimal nutrition therapy  Outcome: Ongoing     Problem: Skin Integrity/Risk  Goal: No skin breakdown during hospitalization  Outcome: Ongoing     Problem: Intellectual/Education/Knowledge Deficit  Goal: Teaching initiated upon admission  Outcome: Ongoing

## 2022-03-19 NOTE — H&P
[facility]      Name:  Divine Banuelos   Age:  76 y.o.  Sex:  female  :  1945  MRN:  [id number]   Visit Number:  [id number]  Admission Date:  3/19/2022  Date Of Service:  22  Primary Care Physician:  Queenie Amezcua APRN    Chief Complaint:     New onset Atrial Fibrillation    History Of Presenting Illness:      76-year-old female presented to [facility] initially secondary to right hand weakness and elevated blood pressure.  Pertinent past medical history of hypertension/hypothyroidism/anxiety/bradycardia/breast cancer. Upon presentation to [facility] patient underwent CTA of head and neck/MRI which revealed acute cortical infarct with left precentral gyrus, mild proximal cervical  internal carotid stenosis, and progression of mild cerebral white matter disease likely sequela of chronic small vessel ischemia with differential diagnoses including demyelinating disease, multiple sclerosis, Lyme disease, migraine headache, vasculitis, and possible trauma.  Echo performed on 3/18/2022 revealed normal left ventricular systolic function with an estimated EF of 55 to 60% with grade 3 diastolic dysfunction present.  Patient was noted to continue to have an NIH stroke scale of 0 during admission.  Patient was initiated on Crestor 20 mg and aspirin 325 mg.  The patient was noted to have an A1c of 5.4 and TSH of 4.25.  However, prior to discharge patient was noted to have  atrial flutter on telemetry with heart rate in the 130s.  Patient was given metoprolol x1 dose for which the patient's heart rate improved < 100.  Patient was also given therapeutic Lovenox.  However, patient remained with atrial flutter/atrial fib which is new onset. Per report, patient requesting to be seen by cardiology  and was transferred to [facility].  Upon assessment, patient endorses that approximately 2 weeks ago she was cleaning out her car and became  lightheaded and almost passed out.  Patient was experiencing palpitations at that time.  However, she has not had any further associated symptoms since then until recent right hand weakness.  Patient currently denies any weakness, dysarthria, dysphagia, chest pain, or palpitations.    Review Of Systems:    All systems were reviewed and negative except as mentioned in history of presenting illness, assessment and plan.    Past Medical History: Patient  has a past medical history of Anxiety, Arthritis, Bell's palsy, Bronchitis, Cataract, Depression, GERD (gastroesophageal reflux disease), History of exercise stress test (2019), Hyperlipidemia, Hypertension, Hypothyroidism, Malignant neoplasm of upper-outer quadrant of right breast in female, estrogen receptor positive (HCC) (10/30/2020), Mitral valve prolapse, Osteoporosis, Pessary maintenance, and Wears dentures.    Past Surgical History: Patient  has a past surgical history that includes Cataract extraction, bilateral; Retinal Detachment Repair (Right); Cataract extraction (Bilateral); Colonoscopy; and breast lumpectomy with sentinel node biopsy (Right, 11/4/2020).    Social History: Patient  reports that she has never smoked. She has never used smokeless tobacco. She reports that she does not drink alcohol and does not use drugs.    Family History: Patient's family history includes Breast cancer in her maternal aunt; Colon cancer in her father, paternal grandmother, and paternal uncle; Hypertension in her mother; Thyroid disease in an other family member.    Allergies:      Patient has no known allergies.    Home Medications:    Prior to Admission Medications     Prescriptions Last Dose Informant Patient Reported? Taking?    aspirin 81 MG EC tablet   Yes No    Take 81 mg by mouth daily.    Calcium Citrate (CITRACAL PO)   Yes No    Take 1 tablet by mouth Daily. + Magnesium     estradiol (ESTRACE VAGINAL) 0.1 MG/GM vaginal cream   No No    Massage pea size amount  "into vaginal opening twice weekly    levothyroxine (SYNTHROID, LEVOTHROID) 50 MCG tablet  Self Yes No    Take 50 mcg by mouth Daily.    Misc Natural Products (OSTEO BI-FLEX TRIPLE STRENGTH PO)   Yes No    Take 1 tablet by mouth 2 (Two) Times a Day.    Omega-3 Fatty Acids (FISH OIL) 1200 MG capsule capsule   Yes No    Take 1,200 mg by mouth 2 (two) times a day.    PARoxetine (PAXIL) 10 MG tablet   Yes No    Take 10 mg by mouth Every Morning.        ED Medications:    Medications   sodium chloride 0.9 % flush 10 mL (has no administration in time range)   sodium chloride 0.9 % flush 10 mL (has no administration in time range)   acetaminophen (TYLENOL) tablet 650 mg (has no administration in time range)   ondansetron (ZOFRAN) tablet 4 mg (has no administration in time range)     Or   ondansetron (ZOFRAN) injection 4 mg (has no administration in time range)     Vital Signs:  Temp:  [98 °F (36.7 °C)] 98 °F (36.7 °C)  Heart Rate:  [52] 52  Resp:  [20] 20  BP: (133)/(65) 133/65        03/19/22  1317   Weight: 74.1 kg (163 lb 5.8 oz)     Body mass index is 28.94 kg/m².    Physical Exam:     Most recent vital Signs: /65 (BP Location: Right arm, Patient Position: Lying)   Pulse 52   Temp 98 °F (36.7 °C) (Oral)   Resp 20   Ht 160 cm (63\")   Wt 74.1 kg (163 lb 5.8 oz)   LMP  (LMP Unknown)   SpO2 97%   BMI 28.94 kg/m²     Physical Exam  Vitals and nursing note reviewed.   Constitutional:       Appearance: Normal appearance.   HENT:      Head: Normocephalic.      Nose: Nose normal.      Mouth/Throat:      Mouth: Mucous membranes are moist.   Eyes:      Pupils: Pupils are equal, round, and reactive to light.   Cardiovascular:      Rate and Rhythm: Normal rate.      Pulses: Normal pulses.      Heart sounds: Normal heart sounds.      Comments: Irregular at times  Pulmonary:      Effort: Pulmonary effort is normal.      Breath sounds: Normal breath sounds.   Abdominal:      General: Bowel sounds are normal.      " Palpations: Abdomen is soft.   Musculoskeletal:         General: Normal range of motion.      Cervical back: Normal range of motion.      Comments: No weakness noted   Skin:     General: Skin is warm.      Capillary Refill: Capillary refill takes less than 2 seconds.   Neurological:      General: No focal deficit present.      Mental Status: She is alert and oriented to person, place, and time. Mental status is at baseline.   Psychiatric:         Mood and Affect: Mood normal.         Laboratory data:    I have reviewed the labs done in the emergency room.          Invalid input(s): LABALBU, PROT  Results from last 7 days   Lab Units 03/19/22  0516 03/18/22  0545 03/17/22  1215   WBC K/uL 6.3 6.9 6.5   HEMOGLOBIN g/dL 12.8 13.7 13.7   HEMATOCRIT % 39.9 44.3 43.1   PLATELETS K/uL 266 285 285     Results from last 7 days   Lab Units 03/17/22  1215   INR  0.99     Results from last 7 days   Lab Units 03/19/22  0516 03/17/22  1215   TROPONIN I ng/mL <0.30 <0.30                           Invalid input(s): USDES,  BLOODU, NITRITITE, BACT, EP    Pain Management Panel    There is no flowsheet data to display.         EKG:      Pending- Sinus rhythm on telemetry with HR 52    Radiology:    No radiology results for the last 3 days    Assessment:    1. New onset atrial fibrillation  2. 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 Essential hypertension  4. Acquired Hypothyroidism  5. History of breast cancer    Plan:    ? New onset atrial fibrillation  - Will initiate Eliquis  - HR currently controlled- Appreciate Cardiology recommendation as patient seems to have Paroxysmal Atrial Fibrillation given symptoms- currently sinus rhythm on tele- EKG pending.  Echo completed with grade III Diastolic Dysfunction with EF 55-60%  - TSH within normal limits.    Recent Acute CVA on 3/17/2022 with acute cortical infarct involving the left precentral gyrus without deficits  -  Will continue Crestor/ASA  - No current deficits  - Has appointment 3/25/22 with [facility]     Essential hypertension  - Currently controlled. Will Monitor.    Hypothyroidism  - Synthroid    History of breast cancer  - s/p lumpectomy    Risk Assessment: High  DVT Prophylaxis: Therapeutic Lovenox  Code Status: Full Code  Diet: Home 1-2 days    Advance Care Planning   ACP discussion was declined by the patient. Patient does not have an advance directive, declines further assistance.           Lily Doss, APRN  03/19/22  13:51 EDT    Dictated utilizing Dragon dictation.

## 2022-03-19 NOTE — DISCHARGE SUMMARY
Discharge Summary      Patient ID: Jackson Whitmore      Patient's PCP: JAE Boland CNP    Admit Date: 3/17/2022     Discharge Date:  3/19/2022    Admitting Provider: Reina Aranda MD    Discharging Provider: TLIA Flores     Reason for this admission:   Acute CVA in the left precentral gyrus  Hypertensive emergency    Reason for transfer:  Atrial fibrillation with RVR    Discharge Diagnoses: Active Hospital Problems    Diagnosis Date Noted    Acute CVA (cerebrovascular accident) ([address] [I63.9] 03/18/2022    Bradycardia [R00.1] 03/18/2022    HTN (hypertension) [facility] Lang 03/18/2022    Hyperlipidemia [E78.5] 03/18/2022    Hypothyroidism [E03.9] 03/18/2022       Procedures:  CTA NECK W CONTRAST   Final Result      CTA Head:   1. No significant stenosis or aneurysm. 2.  No CT correlate for the punctate area of acute ischemia in the left precentral gyrus seen on MRI. CTA Neck:   1. Mild bilateral proximal cervical internal carotid artery stenosis. CTA HEAD W CONTRAST   Final Result      CTA Head:   1. No significant stenosis or aneurysm. 2.  No CT correlate for the punctate area of acute ischemia in the left precentral gyrus seen on MRI. CTA Neck:   1. Mild bilateral proximal cervical internal carotid artery stenosis. MRI BRAIN W WO CONTRAST   Final Result      1. Acute cortical infarction 8 x 4 mm involving the left precentral gyrus     2. Progression of mild cerebral white matter disease, most likely sequela chronic small vessel ischemia. Differential diagnosis also includes but is not limited to: demyelinating disease including multiple sclerosis and Lyme disease, migraine headache    effect, vasculitis, trauma and gliosis. CT Head WO Contrast   Final Result   1. No acute intracranial abnormality.             Consults:   IP CONSULT TO CASE MANAGEMENT  PT OT  SLP  Pharmacy  Phone Consult, [facility] Stroke Team       Briefly:   79-year-old right-hand-dominant female with past medical history of anxiety, bradycardia, hypertension, hypothyroidism and OA admitted with right hand weakness that resolved in 1 hour. CT head negative. Pt started on  (was taking 81mg PTA and statin) and admitted for further care. NIHSS on admission was 0. MRI brain 3/18 revealed acute cortical infarction involving the left precentral gyrus. CTA H/N negative for LVO. . A1c 5.4. TTE with LVEF 12-87%, grade diastolic dysfunction, moderately dilated LA, mild MR, mild TR, estimated PA systolic pressure mildly elevated at 37 mmHg. Case discussed with [facility] Stroke Team. Since pt did not require intervention and NIHSS had been 0 on admission and remained 0, Stroke team recommended BP control, ASA, statin and OP follow up. Pt started on norvasc given SBP >170 in addition to the ASA and statin. Per vitals trend review, HR in the 40's most of the day. Telemetry showed sinus bradycardia. Pt advised to remain here for additional monitoring and IVFs since she had received contrast with both MRI and CTAs on 3/18. In the early morning hours today, patient went into atrial fibrillation/flutter with RVR with heart rate 130s. IVFs were decreased and HR improved to 80s with rest, so no rate controlling agents were given. This morning, pt remains in atrial fibrillation/flutter on the monitor. HR increases to 130-140's with any movement. Norvasc discontinued. Pt started on tLOV and given IV metoprolol 5mg x 1 dose (thus far). HR has improved to 80s while resting, but still 110's with any movement or anxiety. Pt request transfer to [facility] specifically. She states she would feel more comfortable in a facility with a cardiologist in house. Case discussed with Dr. Meagan Ribeiro at [facility]. Pt has been accepted in transfer. Thanks for the assistance. Hospital Course:       Active Hospital Problems     Diagnosis Date Noted    Acute CVA (cerebrovascular accident) [facility]) [I63.9]  -NIHSS on admission 0  -CT Head 3/17: negative  -MRI brain w/wo contrast 3/18: (1) acute cortical infarction 8 x 4 mm involving the left precentral gyrus. (2) progression of mild cerebral white matter disease, most likely sequela of chronic small vessel ischemia. Differential diagnosis also includes but is not limited to: Demyelinating disease including multiple sclerosis and Lyme disease, migraine headache effect, vasculitis, trauma and gliosis. -  -A1c 5.4  -CTA H/N 3/18: no significant stenosis or aneurysm. No CT correlate for the punctate area of acute ischemia in the left precentral gyrus seen on MRI. Mild bilateral proximal cervical internal carotid artery stenosis. -TTE 3/18: LVEF 96-15%, grade diastolic dysfunction, moderately dilated LA, mild MR, mild TR, estimated PA systolic pressure mildly elevated at 37 mmHg.   -pt previously taking ASA 81mg PTA; increased to 325 daily and added crestor 20mg daily  -discussed case with [facility] Stroke Team 3/18. Continue ASA, statin and obtain good BP control. Follow up as OP.  -NIHSS remains 0  -PT OT consulted; pt independent. -SLP consulted with recs for a regular diet and thin liquids  -telemetry: SB with HR 40s on 3/18. Went into atrial fib/flutter with RVR with HR 130s early this morning. Started tLOV and IV metoprolol. Pt being transferred to [facility] per her request as above.    03/18/2022    Atrial fibrillation/flutter  -new diagnosis for pt  -TTE as above  -started tLOV and IV metoprolol 3/19 with some improvement of HR, although pt still in atrial fib/flutter with -120s with any movement. 80s at rest.  -hx of sinus bradycardia; HR was in the 40s upon arrival and on 3/18 prior to onset of afib/flutter  -TSH 4.25  -transfer to [facility] as above     03/18/2022    HTN (hypertension) [I10]  -with HTN urgency on admission (due to CVA) with /68  -SBP 170s on 3/18, started norvasc  -SBP 140s 3/19; dc'ed norvasc. Added IV metoprolol for rate control as above.   03/18/2022    Hyperlipidemia [E78.5]  -total cholesterol 246, HDL 70, , VLDL 21 -->added crestor 3/18    03/18/2022    Hypothyroidism [E03.9]  -TSH 4.25  -continue synthroid            Disposition: Transfer, Dr.Hossam Herron, [facility]    Discharged Condition: Stable    Vital Signs  Temp: 98.1 °F (36.7 °C)  Pulse: 84 (afib)  Resp: 18  BP: (!) 142/77  SpO2: 94 %  O2 Device: None (Room air)       Vital signs reviewed in electronic chart. Physical exam  Constitutional:  Well developed, well nourished, elderly female sitting upright in bed in no acute distress  Eyes:  no scleral icterus, conjunctiva normal   HENT:  Atraumatic, external ears normal, nose normal, oropharynx moist, no pharyngeal exudates. Neck- supple, no JVD, no lymphadenopathy  Respiratory:  No respiratory distress, no wheezing, rales or rhonchi detected  Cardiovascular: Irregularly irregular, no murmurs, no gallops, no rubs, no edema   GI:  Soft, nondistended, normal bowel sounds, nontender, no voluntary guarding  Musculoskeletal:  No cyanosis or obvious acute deformity. Moving all extremities   Integument:  Warm and dry. Lymphatic:  No cervical or axillary lymphadenopathy noted   Neurologic:  Alert & oriented x 3, no apparent focal deficits noted. 5/5 strength in BUE and hands. Normal gait. Psychiatric:  Speech and behavior appropriate       Activity: activity as tolerated  Diet: cardiac diet      Labs:  For convenience and continuity at follow-up the following most recent labs are provided:    CBC:   Lab Results   Component Value Date    WBC 6.3 03/19/2022    HGB 12.8 03/19/2022    HCT 39.9 03/19/2022     03/19/2022       RENAL:   Lab Results   Component Value Date     03/19/2022    K 3.7 03/19/2022    K 4.3 03/18/2022     03/19/2022    CO2 23 03/19/2022    BUN 9 03/19/2022    CREATININE 0.8 03/19/2022         Discharge Medications:     Current Discharge Medication List Details   enoxaparin (LOVENOX) 80 MG/0.8ML injection Inject 0.7 mLs into the skin 2 times daily  Qty: 1 each, Refills: 3      rosuvastatin (CRESTOR) 20 MG tablet Take 1 tablet by mouth nightly  Qty: 30 tablet, Refills: 3      metoprolol (LOPRESSOR) 5 MG/5ML SOLN injection Infuse 5 mLs intravenously every 6 hours  Qty: 1 mL, Refills: 0      fluticasone (FLONASE) 50 MCG/ACT nasal spray 1 spray by Each Nostril route daily  Qty: 16 g, Refills: 3      calcium elemental (OSCAL) 500 MG TABS tablet Take 1 tablet by mouth 2 times daily  Qty: 30 tablet, Refills: 3              Details   aspirin 325 MG EC tablet Take 1 tablet by mouth daily  Qty: 30 tablet, Refills: 3              Details   levothyroxine (SYNTHROID) 50 MCG tablet Take 50 mcg by mouth Daily      ipratropium (ATROVENT) 0.06 % nasal spray Inhale 1-2 sprays in each nostril twice daily      NONFORMULARY Calcium maximum strength- Take one tablet by mouth twice daily      ZINC SULFATE PO Take 1 tablet by mouth daily       PARoxetine (PAXIL) 10 MG tablet Take 10 mg by mouth every morning      Omega-3 Fatty Acids (FISH OIL PO) Take 1 capsule by mouth daily               Patient was seen and examined by Dr. Herrera Zayas and plan of care reviewed. Treatment plan was formulated collaboratively. Signed:  Electronically signed by TILA Doll on 3/19/2022 at 11:01 AM       Thank you JAE Hodge CNP for the opportunity to be involved in this patient's care. If you have any questions or concerns please feel free to contact me at [phone].

## 2022-03-20 VITALS
BODY MASS INDEX: 28.48 KG/M2 | DIASTOLIC BLOOD PRESSURE: 85 MMHG | TEMPERATURE: 97.9 F | RESPIRATION RATE: 18 BRPM | HEIGHT: 63 IN | OXYGEN SATURATION: 97 % | SYSTOLIC BLOOD PRESSURE: 155 MMHG | HEART RATE: 76 BPM | WEIGHT: 160.72 LBS

## 2022-03-20 LAB
ANION GAP SERPL CALCULATED.3IONS-SCNC: 12 MMOL/L (ref 5–15)
BUN SERPL-MCNC: 8 MG/DL (ref 8–23)
BUN/CREAT SERPL: 10.4 (ref 7–25)
CALCIUM SPEC-SCNC: 8.9 MG/DL (ref 8.6–10.5)
CHLORIDE SERPL-SCNC: 106 MMOL/L (ref 98–107)
CO2 SERPL-SCNC: 23 MMOL/L (ref 22–29)
CREAT SERPL-MCNC: 0.77 MG/DL (ref 0.57–1)
DEPRECATED RDW RBC AUTO: 43.1 FL (ref 37–54)
EGFRCR SERPLBLD CKD-EPI 2021: 79.6 ML/MIN/1.73
ERYTHROCYTE [DISTWIDTH] IN BLOOD BY AUTOMATED COUNT: 13.2 % (ref 12.3–15.4)
GLUCOSE SERPL-MCNC: 99 MG/DL (ref 65–99)
HCT VFR BLD AUTO: 41.9 % (ref 34–46.6)
HGB BLD-MCNC: 14 G/DL (ref 12–15.9)
MCH RBC QN AUTO: 30 PG (ref 26.6–33)
MCHC RBC AUTO-ENTMCNC: 33.4 G/DL (ref 31.5–35.7)
MCV RBC AUTO: 89.7 FL (ref 79–97)
PLATELET # BLD AUTO: 277 10*3/MM3 (ref 140–450)
PMV BLD AUTO: 9.9 FL (ref 6–12)
POTASSIUM SERPL-SCNC: 3.6 MMOL/L (ref 3.5–5.2)
RBC # BLD AUTO: 4.67 10*6/MM3 (ref 3.77–5.28)
SODIUM SERPL-SCNC: 141 MMOL/L (ref 136–145)
WBC NRBC COR # BLD: 6.27 10*3/MM3 (ref 3.4–10.8)

## 2022-03-20 PROCEDURE — G0378 HOSPITAL OBSERVATION PER HR: HCPCS

## 2022-03-20 PROCEDURE — 85027 COMPLETE CBC AUTOMATED: CPT | Performed by: NURSE PRACTITIONER

## 2022-03-20 PROCEDURE — 99217 PR OBSERVATION CARE DISCHARGE MANAGEMENT: CPT | Performed by: NURSE PRACTITIONER

## 2022-03-20 PROCEDURE — 80048 BASIC METABOLIC PNL TOTAL CA: CPT | Performed by: NURSE PRACTITIONER

## 2022-03-20 PROCEDURE — 99214 OFFICE O/P EST MOD 30 MIN: CPT | Performed by: INTERNAL MEDICINE

## 2022-03-20 RX ORDER — ATORVASTATIN CALCIUM 20 MG/1
20 TABLET, FILM COATED ORAL DAILY
Qty: 30 TABLET | Refills: 0 | Status: SHIPPED | OUTPATIENT
Start: 2022-03-20 | End: 2022-04-19 | Stop reason: SDUPTHER

## 2022-03-20 RX ADMIN — LEVOTHYROXINE SODIUM 50 MCG: 50 TABLET ORAL at 06:55

## 2022-03-20 RX ADMIN — PAROXETINE HYDROCHLORIDE 10 MG: 20 TABLET, FILM COATED ORAL at 06:55

## 2022-03-20 RX ADMIN — APIXABAN 5 MG: 5 TABLET, FILM COATED ORAL at 08:31

## 2022-03-20 RX ADMIN — Medication 10 ML: at 08:32

## 2022-03-20 NOTE — CASE MANAGEMENT/SOCIAL WORK
Case Management Discharge Note                Selected Continued Care - Discharged on 3/20/2022 Admission date: 3/19/2022 - Discharge disposition: Home or Self Care     Transportation Services  Private: Car    Final Discharge Disposition Code: 01 - home or self-care   Resolved   Obtain sleep study   monitor: The patient's chronic respiratory failure is improving with treatment. The patient does not use supplemental oxygen.  Evaluation:  Reviewed recent labs/diagnostic tests with the patient.  Assessment/Treatment:  Continue current treatment/monitoring regimen.  Condition will be reassessed in 3 months

## 2022-03-20 NOTE — DISCHARGE INSTRUCTIONS
Patient to be discharged home today.  Patient to follow-up with cardiology within 2 weeks  Patient to follow with stroke team neurology in [place] as scheduled on March 25, 2022 at 130  Patient to follow-up with PCP within 1 week  Strict return precautions given to patient and daughter at bedside-further strokelike symptoms such as dysphagia, facial droop, unilateral weakness, or confusion.  Medication education given to daughter and patient at bedside-Eliquis can increase bleeding risk.  Risk versus benefit of taking medication discussed.  Patient and daughter agreeable at this time to continue with Eliquis.  Patient to take metoprolol if sustained heart rate of greater than 130 for 1 hour every 4-6 hours as needed.  Strict return precautions given such as sustained heart rate greater than 130 with chest pain, shortness of air, palpitations, or syncope.

## 2022-03-20 NOTE — CONSULTS
[facility] Consult Note    Referring Provider: Russel  Reason for Consultation: Atrial fibrillation    Patient Care Team:  Queenie Amezcua APRN as PCP - General (Family Medicine)  Stuart Caceres MD as Consulting Physician (General Surgery)    Chief complaint : Right hand weakness    Subjective:    History of present illness: This is a 76-year-old female patient who developed weakness in her right upper extremity primarily her hand associated with paresthesias of the fingers of the right hand on Thursday of last week.  Her neurological symptoms resolved over approximately 30 minutes.  The patient was seen at outside hospital emergency room where a CT scan of the head demonstrated evidence of a left-sided stroke.  The patient also demonstrated evidence of demyelination of the diffuse white matter portions of the brain thought to be unrelated to the findings in the left precentral gyrus.  The patient was also discovered to be in atrial fibrillation with a controlled ventricular response.  The patient had no prior history of atrial fibrillation but does have a history of hypertension as well as hypertensive cardiovascular disease.  She is asymptomatic from a cardiovascular perspective.  She was unaware that her heart was out of rhythm having no palpitations dizziness or syncope.    Review of Systems   Review of Systems   Constitutional: Negative for chills, diaphoresis, fever, malaise/fatigue, weight gain and weight loss.   HENT: Negative for ear discharge, hearing loss, hoarse voice and nosebleeds.    Eyes: Negative for discharge, double vision, pain and photophobia.   Cardiovascular: Negative for chest pain, claudication, cyanosis, dyspnea on exertion, irregular heartbeat, leg swelling, near-syncope, orthopnea, palpitations, paroxysmal nocturnal dyspnea and syncope.   Respiratory: Negative for cough, hemoptysis, shortness of breath, sputum production and wheezing.    Endocrine: Negative for cold intolerance, heat  intolerance, polydipsia, polyphagia and polyuria.   Hematologic/Lymphatic: Negative for adenopathy and bleeding problem. Does not bruise/bleed easily.   Skin: Negative for color change, flushing, itching and rash.   Musculoskeletal: Negative for muscle cramps, muscle weakness, myalgias and stiffness.   Gastrointestinal: Negative for abdominal pain, diarrhea, hematemesis, hematochezia, nausea and vomiting.   Genitourinary: Negative for dysuria, frequency and nocturia.   Neurological: Positive for focal weakness, numbness and paresthesias. Negative for loss of balance and seizures.   Psychiatric/Behavioral: Negative for altered mental status, hallucinations and suicidal ideas.   Allergic/Immunologic: Negative for HIV exposure, hives and persistent infections.       History  Past Medical History:   Diagnosis Date   • Anxiety    • Arthritis    • Bell's palsy    • Bronchitis    • Cataract     bilateral   • Depression    • GERD (gastroesophageal reflux disease)    • History of exercise stress test 2019    patient states it was normal   • Hyperlipidemia    • Hypertension     not currently taking medication for BP   • Hypothyroidism    • Malignant neoplasm of upper-outer quadrant of right breast in female, estrogen receptor positive (HCC) 10/30/2020   • Mitral valve prolapse    • Osteoporosis    • Pessary maintenance    • Wears dentures     upper and lowers   ,   Past Surgical History:   Procedure Laterality Date   • BREAST LUMPECTOMY WITH SENTINEL NODE BIOPSY Right 11/4/2020    Procedure: BREAST LUMPECTOMY WITH SENTINEL NODE BIOPSY RIGHT;  Surgeon: Stuart Caceres MD;  Location: [facility];  Service: General;  Laterality: Right;   • CATARACT EXTRACTION Bilateral    • CATARACT EXTRACTION, BILATERAL     • COLONOSCOPY     • RETINAL DETACHMENT REPAIR Right    ,   Family History   Problem Relation Age of Onset   • Hypertension Mother    • Colon cancer Father    • Colon cancer Paternal Grandmother    • Colon cancer Paternal  "Uncle    • Breast cancer Maternal Aunt    • Thyroid disease Other         Nephew   ,   Social History     Tobacco Use   • Smoking status: Never Smoker   • Smokeless tobacco: Never Used   Vaping Use   • Vaping Use: Never used   Substance Use Topics   • Alcohol use: No   • Drug use: No   ,   Medications Prior to Admission   Medication Sig Dispense Refill Last Dose   • aspirin 81 MG EC tablet Take 81 mg by mouth daily.   3/19/2022 at 0900   • Calcium Citrate (CITRACAL PO) Take 1 tablet by mouth Daily. + Magnesium   3/19/2022 at 0900   • levothyroxine (SYNTHROID, LEVOTHROID) 50 MCG tablet Take 50 mcg by mouth Daily.   3/19/2022 at 0600   • Misc Natural Products (OSTEO BI-FLEX TRIPLE STRENGTH PO) Take 1 tablet by mouth 2 (Two) Times a Day.   3/19/2022 at 0900   • Omega-3 Fatty Acids (FISH OIL) 1200 MG capsule capsule Take 1,200 mg by mouth 2 (two) times a day.   3/19/2022 at 0900   • PARoxetine (PAXIL) 10 MG tablet Take 10 mg by mouth Every Morning.   3/19/2022 at 0900   • estradiol (ESTRACE VAGINAL) 0.1 MG/GM vaginal cream Massage pea size amount into vaginal opening twice weekly 42.5 g 1 3/17/2022 at 1500    and Allergies:  Patient has no known allergies.    Objective:    Vital Sign Min/Max for last 24 hours  Temp  Min: 97.5 °F (36.4 °C)  Max: 98.9 °F (37.2 °C)   BP  Min: 117/76  Max: 142/91   Pulse  Min: 46  Max: 104   Resp  Min: 16  Max: 20   SpO2  Min: 92 %  Max: 97 %   No data recorded   Weight  Min: 72.9 kg (160 lb 11.5 oz)  Max: 74.1 kg (163 lb 5.8 oz)     Flowsheet Rows    Flowsheet Row First Filed Value   Admission Height 160 cm (63\") Documented at 03/19/2022 1317   Admission Weight 74.1 kg (163 lb 5.8 oz) Documented at 03/19/2022 1317             Ejection Fraction  Lab Results   Component Value Date    EF 66 09/20/2019       Physical Exam:   Vitals and nursing note reviewed.   Constitutional:       Appearance: Healthy appearance. Not in distress.   Eyes:      Conjunctiva/sclera: Conjunctivae normal.      " Pupils: Pupils are equal, round, and reactive to light.   HENT:    Mouth/Throat:      Pharynx: Oropharynx is clear.   Neck:      Thyroid: No thyromegaly.      Vascular: No JVR. JVD normal.      Lymphadenopathy: No cervical adenopathy.   Pulmonary:      Effort: Pulmonary effort is normal.      Breath sounds: Normal breath sounds. No wheezing. No rhonchi. No rales.   Chest:      Chest wall: Not tender to palpatation.   Cardiovascular:      PMI at left midclavicular line. Normal rate. Irregularly irregular rhythm. Normal S1. Normal S2.      Murmurs: There is no murmur.      No gallop. No click. No rub.   Pulses:     Intact distal pulses.   Edema:     Peripheral edema absent.   Abdominal:      General: Bowel sounds are normal.      Palpations: Abdomen is soft.      Tenderness: There is no abdominal tenderness.   Musculoskeletal: Normal range of motion.         General: No tenderness. Skin:     General: Skin is warm and dry.   Neurological:      General: No focal deficit present.      Mental Status: Alert and oriented to person, place and time.      Cranial Nerves: Cranial nerves are intact.      Motor: Motor function is intact.      Deep Tendon Reflexes: Reflexes are normal and symmetric.         Results Review:   I reviewed the patient's new clinical results.  Results from last 7 days   Lab Units 03/20/22  0627 03/19/22  0516 03/18/22  0545   WBC 10*3/mm3 6.27 6.3 6.9   HEMOGLOBIN g/dL 14.0 12.8 13.7   HEMATOCRIT % 41.9 39.9 44.3   PLATELETS 10*3/mm3 277 266 285     Results from last 7 days   Lab Units 03/20/22  0627   SODIUM mmol/L 141   POTASSIUM mmol/L 3.6   CHLORIDE mmol/L 106   CO2 mmol/L 23.0   BUN mg/dL 8   CREATININE mg/dL 0.77   GLUCOSE mg/dL 99   CALCIUM mg/dL 8.9     No results found for: TROPONINT  Results from last 7 days   Lab Units 03/18/22  0545   TRIGLYCERIDES mg/dL 106   HDL CHOL mg/dL 70*   LDL CHOL mg/dL 155*         Assessment/Plan:      New onset atrial fibrillation (HCC).  Asymptomatic.   Presentation with TIA.  Element of tachycardia-bradycardia syndrome (the patient has normal rates without AV node blocking medication)    Hypertension    Hypothyroidism    Depression    Recent cerebrovascular accident (CVA).  Left MCA distribution TIA.  Complete resolution of neurologic symptoms over approximately 30 minutes.    Diastolic dysfunction with chronic heart failure (HCC)    History of breast cancer    As needed initiation of beta-blockade for increased heart rates greater than 100 bpm.  Lifelong anticoagulation therapy with Eliquis.      I discussed the patient's findings and my recommendations with patient    Colt Ochoa MD  03/20/22  09:09 EDT

## 2022-03-20 NOTE — PLAN OF CARE
Goal Outcome Evaluation:  Plan of Care Reviewed With: patient        Progress: no change  Outcome Evaluation: Vital signs stable. No acute changes noted during the shift. Discharge orders in.

## 2022-03-20 NOTE — DISCHARGE SUMMARY
[facility]   DISCHARGE SUMMARY      Name:  Divine Banuelos   Age:  76 y.o.  Sex:  female  :  1945  MRN:  [id number]   Visit Number:  [id number]    Admission Date:  3/19/2022  Date of Discharge:  3/20/2022  Primary Care Physician:  Queenie Amezcua APRN    Important issues to note:    -Patient to follow-up with cardiology within 2 weeks- Dr. Cope  -Patient to follow with stroke team neurology in [place] as scheduled on 2022 at 130  -Patient to follow-up with PCP within 1 week  -Strict return precautions given to patient and daughter at bedside-further strokelike symptoms -such as dysphagia, facial droop, unilateral weakness, or confusion.  -Medication education given to daughter and patient at bedside-Eliquis can increase bleeding risk.  Risk versus benefit of taking medication discussed.  Patient and daughter agreeable at this time to continue with Eliquis.   - Patient to take metoprolol if sustained heart rate of greater than 130 for 1 hour every 4-6 hours as needed.  Strict return precautions given such as sustained heart rate greater than 130 with chest pain, shortness of air, palpitations, or syncope.    Discharge Diagnoses:     1. New onset atrial fibrillation  2. 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 Essential hypertension  4. Acquired Hypothyroidism  5. History of breast cancer  6. Chronic Diastolic dysfunction with Heart Failure     Problem List:     Active Hospital Problems    Diagnosis  POA   • **New onset atrial fibrillation (HCC) [I48.91]  Yes   • Recent cerebrovascular accident (CVA) [Z86.73]  Yes   • Diastolic dysfunction with chronic heart failure (HCC) [I50.32]  Yes   • History of breast cancer [Z85.3]  Not Applicable   • Hypertension [I10]  Yes   • Hypothyroidism [E03.9]  Yes   • Depression [F32.A]  Yes      Resolved Hospital Problems   No resolved problems to display.      Presenting Problem:    No chief complaint on file.     Consults:     Consulting Physician(s)  Chat With All Active Members    Provider Relationship Specialty    Colt Ochoa MD  Consulting Physician Cardiology        Procedures Performed:        History of presenting illness/Hospital Course:    76-year-old female presented to [facility] initially secondary to right hand weakness and elevated blood pressure.  Pertinent past medical history of hypertension/hypothyroidism/anxiety/bradycardia/breast cancer. Upon presentation to [facility] patient underwent CTA of head and neck/MRI which revealed acute cortical infarct with left precentral gyrus, mild proximal cervical  internal carotid stenosis, and progression of mild cerebral white matter disease likely sequela of chronic small vessel ischemia with differential diagnoses including demyelinating disease, multiple sclerosis, Lyme disease, migraine headache, vasculitis, and possible trauma.  Echo performed on 3/18/2022 revealed normal left ventricular systolic function with an estimated EF of 55 to 60% with grade 3 diastolic dysfunction present.  Patient was noted to continue to have an NIH stroke scale of 0 during admission.  Patient was initiated on Crestor 20 mg and aspirin 325 mg.  The patient was noted to have an A1c of 5.4 and TSH of 4.25.  However, prior to discharge patient was noted to have  atrial flutter on telemetry with heart rate in the 130s.  Patient was given metoprolol x1 dose for which the patient's heart rate improved < 100.  Patient was also given therapeutic Lovenox.  However, patient remained with atrial flutter/atrial fib which is new onset. Per report, patient requesting to be seen by cardiology  and was transferred to [facility].  Upon assessment, patient endorsed that approximately 2 weeks ago she was cleaning out her car and became lightheaded and almost passed out.  Patient was experiencing palpitations at that  time.  However, she has not had any further associated symptoms since then until recent right hand weakness.  Patient without current weakness, dysarthria, dysphagia, chest pain, or palpitations.  Patient was seen by Dr. Ochoa cardiology.  Dr. Ochoa was agreeable with initiation of Eliquis.  Given patient bradycardia at rest with tachycardia noted with exertion, patient was given metoprolol as needed for sustained heart rate greater than 130 over 1 hour.  Again, patient completely asymptomatic during current hospitalization.  Patient and daughter agreeable to follow-up with cardiology in 2 weeks.Strict return precautions given to patient and daughter at bedside with further stroke-like symptoms such as dysphagia, facial droop, unilateral weakness, or confusion.  Medication education given to daughter and patient at bedside. Eliquis can increase bleeding risk.  Risk versus benefit of taking medication discussed.  Patient and daughter agreeable at this time to continue with Eliquis.  Eliquis cost to patient $50 per month per pharmacy.  Patient and daughter at bedside agreeable and able to pay this at this time.    Vital Signs:    Temp:  [97.5 °F (36.4 °C)-98.9 °F (37.2 °C)] 97.9 °F (36.6 °C)  Heart Rate:  [] 76  Resp:  [16-20] 18  BP: (117-155)/(65-91) 155/85    Physical Exam:    General Appearance:  Alert and cooperative. Pleasant. Appears younger than stated age.   Head:  Atraumatic and normocephalic.   Eyes: Conjunctivae and sclerae normal, no icterus. No pallor.   Ears:  Ears with no abnormalities noted.   Throat: No oral lesions, no thrush, oral mucosa moist.   Neck: Supple, trachea midline, no thyromegaly.   Back:   No kyphoscoliosis present. No tenderness to palpation.   Lungs:   Breath sounds heard bilaterally equally.  No crackles or wheezing. No Pleural rub or bronchial breathing.   Heart:  Normal S1 and S2, no murmur, no gallop, no rub. No JVD. Regularly irregular.   Abdomen:   Normal bowel  sounds, no masses, no organomegaly. Soft, nontender, nondistended, no rebound tenderness.   Extremities: Supple, no edema, no cyanosis, no clubbing.   Pulses: Pulses palpable bilaterally.   Skin: No bleeding or rash.   Neurologic: Alert and oriented x 3. No facial asymmetry. Moves all four limbs. No tremors.     Pertinent Lab Results:     Results from last 7 days   Lab Units 03/20/22  0627   SODIUM mmol/L 141   POTASSIUM mmol/L 3.6   CHLORIDE mmol/L 106   CO2 mmol/L 23.0   BUN mg/dL 8   CREATININE mg/dL 0.77   CALCIUM mg/dL 8.9   GLUCOSE mg/dL 99     Results from last 7 days   Lab Units 03/20/22  0627 03/19/22  0516 03/18/22  0545   WBC 10*3/mm3 6.27 6.3 6.9   HEMOGLOBIN g/dL 14.0 12.8 13.7   HEMATOCRIT % 41.9 39.9 44.3   PLATELETS 10*3/mm3 277 266 285     Results from last 7 days   Lab Units 03/17/22  1215   INR  0.99     Results from last 7 days   Lab Units 03/19/22  0516 03/17/22  1215   TROPONIN I ng/mL <0.30 <0.30         Pertinent Radiology Results:    Imaging Results (All)     None          Echo:    Results for orders placed during the hospital encounter of 09/20/19    Adult Transthoracic Echo Complete W/ Cont if Necessary Per Protocol    Interpretation Summary  · Left ventricular systolic function is normal.  · Estimated EF appears to be in the range of 56 - 60%.  · Left ventricular diastolic dysfunction (grade II) consistent with pseudonormalization.  · Left atrial cavity size is borderline dilated.  · Mild mitral valve regurgitation is present    Condition on Discharge:      Stable.    Code status during the hospital stay:    Code Status and Medical Interventions:   Ordered at: 03/19/22 1431     Level Of Support Discussed With:    Patient     Code Status (Patient has no pulse and is not breathing):    CPR (Attempt to Resuscitate)     Medical Interventions (Patient has pulse or is breathing):    Full Support     Discharge Disposition:    Home or Self Care    Discharge Medications:       Discharge  Medications      New Medications      Instructions Start Date   apixaban 5 MG tablet tablet  Commonly known as: ELIQUIS   5 mg, Oral, Every 12 Hours Scheduled      atorvastatin 20 MG tablet  Commonly known as: LIPITOR   20 mg, Oral, Daily      metoprolol tartrate 25 MG tablet  Commonly known as: LOPRESSOR   6.25 mg, Oral, As Needed, Patient is to take 6.25 mg every 4-6 hours prn for sustained HR > 130 over 1 hour- Please divide tablet if possible prior to patient picking up.         Continue These Medications      Instructions Start Date   aspirin 81 MG EC tablet   81 mg, Oral, Daily      CITRACAL PO   1 tablet, Oral, Daily, + Magnesium       estradiol 0.1 MG/GM vaginal cream  Commonly known as: ESTRACE VAGINAL   Massage pea size amount into vaginal opening twice weekly      fish oil 1200 MG capsule capsule   1,200 mg, Oral, 2 Times Daily      levothyroxine 50 MCG tablet  Commonly known as: SYNTHROID, LEVOTHROID   50 mcg, Oral, Daily      OSTEO BI-FLEX TRIPLE STRENGTH PO   1 tablet, Oral, 2 Times Daily      PARoxetine 10 MG tablet  Commonly known as: PAXIL   10 mg, Oral, Every Morning           Discharge Diet:     Diet Instructions     Diet: Cardiac      Discharge Diet: Cardiac        Activity at Discharge:     Activity Instructions     Activity as Tolerated          Follow-up Appointments:     Follow-up Information     Queenie Amezcua APRN Follow up in 1 week(s).    Specialty: Family Medicine  Why: Schedule follow-up appointment before discharge if able  Contact information:  [address] [place] [address] [phone]             Reinaldo Cope III, MD Follow up in 2 week(s).    Specialties: Cardiology, Interventional Cardiology  Why: Schedule follow-up appointment before discharge if able  Contact information:  [facility]   [facility] [address] [address] [phone]                       Future Appointments   Date Time Provider Department Center   3/25/2022  1:30 PM APC NEURO STROKE [facility]  STRK [facility]   5/24/2022  2:10 PM Denise Hernandez PA-C [facility] OBG [facility]   6/6/2022  1:00 PM Stuart Caceres MD [facility] GS [facility]   7/20/2022 11:45 AM Diandra Elliott MD [facility] END BM [facility]   7/28/2022  1:30 PM Reinaldo Cope III, MD [facility] LCC [facility]     Test Results Pending at Discharge:           Lily Doss, GILBERT  03/20/22  12:03 EDT    Time: I spent >30 minutes on this discharge activity which included: face-to-face encounter with the patient, reviewing the data in the system, coordination of the care with the nursing staff as well as consultants, documentation, and entering orders.     Dictated utilizing Dragon dictation.

## 2022-03-20 NOTE — PLAN OF CARE
Goal Outcome Evaluation:  Plan of Care Reviewed With: patient        Progress: no change  Outcome Evaluation: VSS, no acute events this shift, pt denied complaints.

## 2022-03-20 NOTE — H&P
[facility]   HISTORY AND PHYSICAL      Name:  Divine Banuelos   Age:  76 y.o.  Sex:  female  :  1945  MRN:  [id number]   Visit Number:  [id number]  Admission Date:  3/19/2022  Date Of Service:  22  Primary Care Physician:  Queenie Amezcua APRN    Chief Complaint:     New onset Atrial Fibrillation    History Of Presenting Illness:      76-year-old female presented to [facility] initially secondary to right hand weakness and elevated blood pressure.  Pertinent past medical history of hypertension/hypothyroidism/anxiety/bradycardia/breast cancer. Upon presentation to [facility] patient underwent CTA of head and neck/MRI which revealed acute cortical infarct with left precentral gyrus, mild proximal cervical  internal carotid stenosis, and progression of mild cerebral white matter disease likely sequela of chronic small vessel ischemia with differential diagnoses including demyelinating disease, multiple sclerosis, Lyme disease, migraine headache, vasculitis, and possible trauma.  Echo performed on 3/18/2022 revealed normal left ventricular systolic function with an estimated EF of 55 to 60% with grade 3 diastolic dysfunction present.  Patient was noted to continue to have an NIH stroke scale of 0 during admission.  Patient was initiated on Crestor 20 mg and aspirin 325 mg.  The patient was noted to have an A1c of 5.4 and TSH of 4.25.  However, prior to discharge patient was noted to have  atrial flutter on telemetry with heart rate in the 130s.  Patient was given metoprolol x1 dose for which the patient's heart rate improved < 100.  Patient was also given therapeutic Lovenox.  However, patient remained with atrial flutter/atrial fib which is new onset. Per report, patient requesting to be seen by cardiology  and was transferred to [facility].  Upon assessment, patient endorses that approximately 2 weeks ago she was cleaning out her car and became  lightheaded and almost passed out.  Patient was experiencing palpitations at that time.  However, she has not had any further associated symptoms since then until recent right hand weakness.  Patient currently denies any weakness, dysarthria, dysphagia, chest pain, or palpitations.    Review Of Systems:    All systems were reviewed and negative except as mentioned in history of presenting illness, assessment and plan.    Past Medical History: Patient  has a past medical history of Anxiety, Arthritis, Bell's palsy, Bronchitis, Cataract, Depression, GERD (gastroesophageal reflux disease), History of exercise stress test (2019), Hyperlipidemia, Hypertension, Hypothyroidism, Malignant neoplasm of upper-outer quadrant of right breast in female, estrogen receptor positive (HCC) (10/30/2020), Mitral valve prolapse, Osteoporosis, Pessary maintenance, and Wears dentures.    Past Surgical History: Patient  has a past surgical history that includes Cataract extraction, bilateral; Retinal Detachment Repair (Right); Cataract extraction (Bilateral); Colonoscopy; and breast lumpectomy with sentinel node biopsy (Right, 11/4/2020).    Social History: Patient  reports that she has never smoked. She has never used smokeless tobacco. She reports that she does not drink alcohol and does not use drugs.    Family History: Patient's family history includes Breast cancer in her maternal aunt; Colon cancer in her father, paternal grandmother, and paternal uncle; Hypertension in her mother; Thyroid disease in an other family member.    Allergies:      Patient has no known allergies.    Home Medications:    Prior to Admission Medications       Prescriptions Last Dose Informant Patient Reported? Taking?    aspirin 81 MG EC tablet   Yes No    Take 81 mg by mouth daily.    Calcium Citrate (CITRACAL PO)   Yes No    Take 1 tablet by mouth Daily. + Magnesium     estradiol (ESTRACE VAGINAL) 0.1 MG/GM vaginal cream   No No    Massage pea size amount  "into vaginal opening twice weekly    levothyroxine (SYNTHROID, LEVOTHROID) 50 MCG tablet  Self Yes No    Take 50 mcg by mouth Daily.    Misc Natural Products (OSTEO BI-FLEX TRIPLE STRENGTH PO)   Yes No    Take 1 tablet by mouth 2 (Two) Times a Day.    Omega-3 Fatty Acids (FISH OIL) 1200 MG capsule capsule   Yes No    Take 1,200 mg by mouth 2 (two) times a day.    PARoxetine (PAXIL) 10 MG tablet   Yes No    Take 10 mg by mouth Every Morning.          ED Medications:    Medications   sodium chloride 0.9 % flush 10 mL (10 mL Intravenous Given 3/20/22 0832)   sodium chloride 0.9 % flush 10 mL (has no administration in time range)   acetaminophen (TYLENOL) tablet 650 mg (has no administration in time range)   ondansetron (ZOFRAN) tablet 4 mg (has no administration in time range)     Or   ondansetron (ZOFRAN) injection 4 mg (has no administration in time range)   apixaban (ELIQUIS) tablet 5 mg (5 mg Oral Given 3/20/22 0831)   levothyroxine (SYNTHROID, LEVOTHROID) tablet 50 mcg (50 mcg Oral Given 3/20/22 0655)   PARoxetine (PAXIL) tablet 10 mg (10 mg Oral Given 3/20/22 0655)   fluticasone (FLONASE) 50 MCG/ACT nasal spray 2 spray (2 sprays Each Nare Not Given 3/19/22 2300)     Vital Signs:  Temp:  [97.5 °F (36.4 °C)-98.9 °F (37.2 °C)] 97.9 °F (36.6 °C)  Heart Rate:  [] 76  Resp:  [16-20] 18  BP: (117-155)/(65-91) 155/85        03/19/22  1317 03/20/22  0346   Weight: 74.1 kg (163 lb 5.8 oz) 72.9 kg (160 lb 11.5 oz)     Body mass index is 28.47 kg/m².    Physical Exam:     Most recent vital Signs: /85 (BP Location: Left arm, Patient Position: Lying)   Pulse 76   Temp 97.9 °F (36.6 °C) (Oral)   Resp 18   Ht 160 cm (63\")   Wt 72.9 kg (160 lb 11.5 oz)   LMP  (LMP Unknown)   SpO2 97%   BMI 28.47 kg/m²     Physical Exam  Vitals and nursing note reviewed.   Constitutional:       Appearance: Normal appearance.   HENT:      Head: Normocephalic.      Nose: Nose normal.      Mouth/Throat:      Mouth: Mucous " membranes are moist.   Eyes:      Pupils: Pupils are equal, round, and reactive to light.   Cardiovascular:      Rate and Rhythm: Normal rate.      Pulses: Normal pulses.      Heart sounds: Normal heart sounds.      Comments: Irregular at times  Pulmonary:      Effort: Pulmonary effort is normal.      Breath sounds: Normal breath sounds.   Abdominal:      General: Bowel sounds are normal.      Palpations: Abdomen is soft.   Musculoskeletal:         General: Normal range of motion.      Cervical back: Normal range of motion.      Comments: No weakness noted   Skin:     General: Skin is warm.      Capillary Refill: Capillary refill takes less than 2 seconds.   Neurological:      General: No focal deficit present.      Mental Status: She is alert and oriented to person, place, and time. Mental status is at baseline.   Psychiatric:         Mood and Affect: Mood normal.         Laboratory data:    I have reviewed the labs done in the emergency room.    Results from last 7 days   Lab Units 03/20/22  0627   SODIUM mmol/L 141   POTASSIUM mmol/L 3.6   CHLORIDE mmol/L 106   CO2 mmol/L 23.0   BUN mg/dL 8   CREATININE mg/dL 0.77   CALCIUM mg/dL 8.9   GLUCOSE mg/dL 99     Results from last 7 days   Lab Units 03/20/22  0627 03/19/22  0516 03/18/22  0545   WBC 10*3/mm3 6.27 6.3 6.9   HEMOGLOBIN g/dL 14.0 12.8 13.7   HEMATOCRIT % 41.9 39.9 44.3   PLATELETS 10*3/mm3 277 266 285     Results from last 7 days   Lab Units 03/17/22  1215   INR  0.99     Results from last 7 days   Lab Units 03/19/22  0516 03/17/22  1215   TROPONIN I ng/mL <0.30 <0.30                           Invalid input(s): USDES,  BLOODU, NITRITITE, BACT, EP    Pain Management Panel    There is no flowsheet data to display.         EKG:      Pending- Sinus rhythm on telemetry with HR 52    Radiology:    No radiology results for the last 3 days    Assessment:    1. New onset atrial fibrillation  2. Recent CVA on 3/17/2022 with acute cortical infarct involving the left  precentral gyrus without deficits  3. Essential hypertension  4. Acquired Hypothyroidism  5. History of breast cancer    Plan:    ? New onset atrial fibrillation  - Will initiate Eliquis  - HR currently controlled- Appreciate Cardiology recommendation as patient seems to have Paroxysmal Atrial Fibrillation given symptoms- currently sinus rhythm on tele- EKG pending.  Echo completed with grade III Diastolic Dysfunction with EF 55-60%  - TSH within normal limits.    Recent Acute CVA on 3/17/2022 with acute cortical infarct involving the left precentral gyrus without deficits  - Will continue Crestor/ASA  - No current deficits  - Has appointment 3/25/22 with [facility]     Essential hypertension  - Currently controlled. Will Monitor.    Hypothyroidism  - Synthroid    History of breast cancer  - s/p lumpectomy    Risk Assessment: High  DVT Prophylaxis: Therapeutic Lovenox  Code Status: Full Code  Diet: Home 1-2 days    Advance Care Planning   ACP discussion was declined by the patient. Patient does not have an advance directive, declines further assistance.           Lily Doss, GILBERT  03/20/22  12:03 EDT    Dictated utilizing Dragon dictation.

## 2022-03-21 ENCOUNTER — READMISSION MANAGEMENT (OUTPATIENT)
Dept: CALL CENTER | Facility: HOSPITAL | Age: 77
End: 2022-03-21

## 2022-03-21 LAB
QT INTERVAL: 448 MS
QTC INTERVAL: 420 MS

## 2022-03-21 NOTE — OUTREACH NOTE
Prep Survey    Flowsheet Row Responses   [facility] facility patient discharged from? [facility]   Is LACE score < 7 ? Yes   Emergency Room discharge w/ pulse ox? No   Eligibility Readm Mgmt   Discharge diagnosis New onset atrial fibrillation   Does the patient have one of the following disease processes/diagnoses(primary or secondary)? CHF   Does the patient have Home health ordered? No   Is there a DME ordered? No   Comments regarding appointments see AVS   Medication alerts for this patient Eliquis, Lipitor and Lopressor   Prep survey completed? Yes          FRANK BILLS - Registered Nurse

## 2022-03-23 LAB
EKG ATRIAL RATE: 312 BPM
EKG DIAGNOSIS: NORMAL
EKG P AXIS: 258 DEGREES
EKG Q-T INTERVAL: 422 MS
EKG QRS DURATION: 94 MS
EKG QTC CALCULATION (BAZETT): 462 MS
EKG R AXIS: 66 DEGREES
EKG T AXIS: 74 DEGREES
EKG VENTRICULAR RATE: 72 BPM

## 2022-03-25 ENCOUNTER — OFFICE VISIT (OUTPATIENT)
Dept: NEUROLOGY | Facility: CLINIC | Age: 77
End: 2022-03-25

## 2022-03-25 VITALS
BODY MASS INDEX: 28.53 KG/M2 | WEIGHT: 161 LBS | SYSTOLIC BLOOD PRESSURE: 158 MMHG | HEIGHT: 63 IN | DIASTOLIC BLOOD PRESSURE: 82 MMHG

## 2022-03-25 DIAGNOSIS — E78.5 DYSLIPIDEMIA: Primary | ICD-10-CM

## 2022-03-25 DIAGNOSIS — Z86.73 HISTORY OF STROKE: ICD-10-CM

## 2022-03-25 DIAGNOSIS — I48.91 NEW ONSET ATRIAL FIBRILLATION: ICD-10-CM

## 2022-03-25 PROCEDURE — 99214 OFFICE O/P EST MOD 30 MIN: CPT | Performed by: CLINICAL NURSE SPECIALIST

## 2022-03-25 RX ORDER — IPRATROPIUM BROMIDE 42 UG/1
1 SPRAY, METERED NASAL AS NEEDED
COMMUNITY
Start: 2022-03-09

## 2022-03-25 RX ORDER — OMEGA-3/DHA/EPA/FISH OIL 300-1000MG
1 CAPSULE ORAL DAILY
COMMUNITY
End: 2022-04-19

## 2022-03-25 NOTE — PROGRESS NOTES
New Patient Office Visit      Encounter Date: 2022   Patient Name: Divine Banuelos  : 1945   MRN: [id number]   PCP: Queenie Amezcua APRN    Referring Provider: No ref. provider found     Chief Complaint:    Chief Complaint   Patient presents with   • Stroke       History of Present Illness: Divine Baneulos is a 76 y.o. female who is here today to establish care.  Patient presents to clinic as a referral from her hospitalization at [facility] and early 2022 where she was noted to have had a tiny area of infarct in her left precentral gyrus.  Apparently she presented to the emergency department with numbness in 2 of her fingers that lasted 1 to 2 minutes, followed by some right hand incoordination the last approximately 1/2-hour and resolved on its own.  Patient had full stroke work-up at the outlying facility, however she does not bring any records or disc with her.  We did receive some records for which I have reviewed.  Prior to admission her known medical diagnoses were depression, hypothyroidism, anxiety and depression.  She was found to be in A. fib while hospitalized and was discharged home on Eliquis 5 mg twice daily, as well as aspirin 81 mg daily.  She continues to take these medications, she denies any overt bleeding.  Her symptoms have resolved and she feels back to baseline.    Stroke Risk Factors: atrial fibrillation and hyperlipidemia      Subjective      Review of Systems:   Review of Systems   Constitutional: Negative.    HENT: Negative.    Eyes: Negative.    Gastrointestinal: Negative.    Genitourinary: Negative.    Musculoskeletal: Negative.    Neurological: Positive for numbness.   Psychiatric/Behavioral: Negative.        Past Medical History:   Past Medical History:   Diagnosis Date   • Anxiety    • Arthritis    • Bell's palsy    • Bronchitis    • Cataract     bilateral   • Depression    • GERD (gastroesophageal reflux disease)    • History of exercise stress test  2019    patient states it was normal   • Hyperlipidemia    • Hypertension     not currently taking medication for BP   • Hypothyroidism    • Malignant neoplasm of upper-outer quadrant of right breast in female, estrogen receptor positive (HCC) 10/30/2020   • Mitral valve prolapse    • Osteoporosis    • Pessary maintenance    • Wears dentures     upper and lowers       Past Surgical History:   Past Surgical History:   Procedure Laterality Date   • BREAST LUMPECTOMY WITH SENTINEL NODE BIOPSY Right 11/4/2020    Procedure: BREAST LUMPECTOMY WITH SENTINEL NODE BIOPSY RIGHT;  Surgeon: Stuart Caceres MD;  Location: [facility];  Service: General;  Laterality: Right;   • CATARACT EXTRACTION Bilateral    • CATARACT EXTRACTION, BILATERAL     • COLONOSCOPY     • RETINAL DETACHMENT REPAIR Right        Family History:   Family History   Problem Relation Age of Onset   • Hypertension Mother    • Colon cancer Father    • Colon cancer Paternal Grandmother    • Colon cancer Paternal Uncle    • Breast cancer Maternal Aunt    • Thyroid disease Other         Nephew       Social History:   Social History     Socioeconomic History   • Marital status:    Tobacco Use   • Smoking status: Never Smoker   • Smokeless tobacco: Never Used   Vaping Use   • Vaping Use: Never used   Substance and Sexual Activity   • Alcohol use: No   • Drug use: No   • Sexual activity: Defer     Birth control/protection: Post-menopausal       Medications:     Current Outpatient Medications:   •  apixaban (ELIQUIS) 5 MG tablet tablet, Take 1 tablet by mouth Every 12 (Twelve) Hours. Indications: Atrial Fibrillation, Disp: 60 tablet, Rfl: 0  •  aspirin 81 MG EC tablet, Take 81 mg by mouth daily., Disp: , Rfl:   •  atorvastatin (LIPITOR) 20 MG tablet, Take 1 tablet by mouth Daily. (Patient taking differently: Take 80 mg by mouth Daily.), Disp: 30 tablet, Rfl: 0  •  Calcium Citrate (CITRACAL PO), Take 1 tablet by mouth Daily. + Magnesium, Disp: , Rfl:   •   "estradiol (ESTRACE VAGINAL) 0.1 MG/GM vaginal cream, Massage pea size amount into vaginal opening twice weekly, Disp: 42.5 g, Rfl: 1  •  fish oil-omega-3 fatty acids 1000 MG capsule, Take 1 capsule by mouth Daily., Disp: , Rfl:   •  ipratropium (ATROVENT) 0.06 % nasal spray, , Disp: , Rfl:   •  levothyroxine (SYNTHROID, LEVOTHROID) 50 MCG tablet, Take 50 mcg by mouth Daily., Disp: , Rfl:   •  metoprolol tartrate (LOPRESSOR) 25 MG tablet, Take 0.25 tablets by mouth As Needed (Sustained HR > 130 over 1 hour). Patient is to take 6.25 mg every 4-6 hours prn for sustained HR > 130 over 1 hour- Please divide tablet if possible prior to patient picking up., Disp: 10 tablet, Rfl: 0  •  Misc Natural Products (OSTEO BI-FLEX TRIPLE STRENGTH PO), Take 1 tablet by mouth 2 (Two) Times a Day., Disp: , Rfl:   •  Omega-3 Fatty Acids (FISH OIL) 1200 MG capsule capsule, Take 1,200 mg by mouth 2 (two) times a day., Disp: , Rfl:   •  PARoxetine (PAXIL) 10 MG tablet, Take 10 mg by mouth 2 (Two) Times a Day., Disp: , Rfl:   •  ZINC SULFATE PO, Take 1 tablet by mouth Daily., Disp: , Rfl:     Allergies:   No Known Allergies    Objective     Physical Exam:  Vital Signs:   Vitals:    03/25/22 1409   BP: 158/82   Weight: 73 kg (161 lb)   Height: 160 cm (63\")     Body mass index is 28.52 kg/m².     Physical Exam  Constitutional:       Appearance: Normal appearance.   HENT:      Head: Normocephalic and atraumatic.      Mouth/Throat:      Mouth: Mucous membranes are moist.   Eyes:      Extraocular Movements: Extraocular movements intact.      Pupils: Pupils are equal, round, and reactive to light.   Pulmonary:      Effort: Pulmonary effort is normal.   Skin:     General: Skin is warm and dry.   Neurological:      General: No focal deficit present.      Deep Tendon Reflexes: Strength normal.      Reflex Scores:       Bicep reflexes are 1+ on the right side and 1+ on the left side.       Brachioradialis reflexes are 1+ on the right side and 1+ on " the left side.       Patellar reflexes are 2+ on the right side and 2+ on the left side.  Psychiatric:         Mood and Affect: Mood normal.         Speech: Speech normal.         Neurological Exam  Mental Status  Awake, alert and oriented to person, place and time. Speech is normal. Language is fluent with no aphasia. Able to perform serial calculations.    Cranial Nerves  CN II: Visual fields full to confrontation.  CN III, IV, VI: Extraocular movements intact bilaterally. Pupils equal round and reactive to light bilaterally.  CN V: Facial sensation is normal.  CN VII: Full and symmetric facial movement.  CN VIII: Hearing appears intact.  CN IX, X: Palate elevates symmetrically  CN XI: Shoulder shrug strength is normal.  CN XII: Tongue midline without atrophy or fasciculations.    Motor   Strength is 5/5 throughout all four extremities.    Sensory  Sensation is intact to light touch, pinprick, vibration and proprioception in all four extremities.    Reflexes                                            Right                      Left  Brachioradialis                    1+                         1+  Biceps                                 1+                         1+  Patellar                                2+                         2+    Coordination    No obvious dysmetria.    Gait  Casual gait is normal including stance, stride, and arm swing.       Imaging Reviewed: Unable to review imaging, reviewed medical record report.  MRI brain showed left precentral gyrus  CTA head neck did not show any significant stenosis  TTE showed an EF of 55 to 60%, mildly dilated left atrium    Laboratory Results: Hemoglobin A1c 5.4, , total cholesterol 246, AST 21, ALT 15, B12 315, hemoglobin 13.7, hematocrit 44.3, platelets 285    NIH Stroke Scale    1a  Level of consciousness: 0=alert; keenly responsive   1b. LOC questions:  0=Answers both questions correctly    1c. LOC commands: 0=Performs both tasks correctly   2.   Best Gaze: 0=normal   3. Visual: 0=No visual loss   4. Facial Palsy: 0=Normal symmetric movement   5a. Motor left arm: 0=No drift, limb holds 90 (or 45) degrees for full 10 seconds   5b.  Motor right arm: 0=No drift, limb holds 90 (or 45) degrees for full 10 seconds   6a. Motor left le=No drift, limb holds 90 (or 45) degrees for full 10 seconds   6b  Motor right le=No drift, limb holds 90 (or 45) degrees for full 10 seconds   7. Limb Ataxia: 0=Absent   8.  Sensory: 0=Normal; no sensory loss   9. Best Language:  0=No aphasia, normal   10. Dysarthria: 0=Normal   11. Extinction and Inattention: 0=No abnormality    Total:   0       Modified Isaias Score 0    MODIFIED ISAIAS SCALE (to be assessed for each patient having history of stroke) []Stroke history but not assessed  [x]0: No symptoms at all  []1: No significant disability despite symptoms  []2: Slight disability  []3: Moderate disability  []4: Moderately severe disability  []5: Severe disability  []6: Death        PHQ-9 Depression Screening  Little interest or pleasure in doing things? 0-->not at all   Feeling down, depressed, or hopeless? 0-->not at all   Trouble falling or staying asleep, or sleeping too much?  0   Feeling tired or having little energy?  0   Poor appetite or overeating?  0   Feeling bad about yourself - or that you are a failure or have let yourself or your family down?  0   Trouble concentrating on things, such as reading the newspaper or watching television?  0   Moving or speaking so slowly that other people could have noticed? Or the opposite - being so fidgety or restless that you have been moving around a lot more than usual?  0   Thoughts that you would be better off dead, or of hurting yourself in some way?  0   PHQ-9 Total Score 0   If you checked off any problems, how difficult have these problems made it for you to do your work, take care of things at home, or get along with other people?          Assessment / Plan       Assessment/Plan:   Diagnoses and all orders for this visit:    1. mild Dyslipidemia (Primary)  -     Lipid Panel; Future  -     Comprehensive Metabolic Panel; Future  -Continue Lipitor 20 mg nightly at this time, if repeat lipid profile does not show significant decrease in LDL will need to increase her atorvastatin dose to at least 40 mg    2. History of stroke  -Aspirin 81 mg daily    3. New onset atrial fibrillation (HCC)  -Eliquis 5 mg twice daily  -Continue to follow with Dr. Cope       Reviewed results from medical records with patient.  Plan is to continue Eliquis 5 mg twice daily as well as aspirin 81 mg at this time.  We will go ahead and continue Lipitor 20 mg as patient is concerned about taking statins in the first place.  We will repeat lipid panel in about 3 months, if LDL has not significantly decreased at that time we will need to increase her atorvastatin to at least 40 mg.  Patient verbalized understanding and denies any questions at this time.  We will see her back in about 3 months or sooner if needed.    Follow Up:   Return in about 3 months (around 6/25/2022).    GILBERT Andino  [facility] Stroke

## 2022-03-30 ENCOUNTER — READMISSION MANAGEMENT (OUTPATIENT)
Dept: CALL CENTER | Facility: HOSPITAL | Age: 77
End: 2022-03-30

## 2022-03-30 NOTE — OUTREACH NOTE
CHF Week 2 Survey    Flowsheet Row Responses   [facility] facility patient discharged from? [facility]   Does the patient have one of the following disease processes/diagnoses(primary or secondary)? CHF   Week 2 attempt successful? No   Unsuccessful attempts Attempt 1          RU VELOZ - Registered Nurse    RU VELOZ - Registered Nurse

## 2022-04-04 ENCOUNTER — READMISSION MANAGEMENT (OUTPATIENT)
Dept: CALL CENTER | Facility: HOSPITAL | Age: 77
End: 2022-04-04

## 2022-04-04 NOTE — OUTREACH NOTE
CHF Week 2 Survey    Flowsheet Row Responses   [facility] facility patient discharged from? [facility]   Does the patient have one of the following disease processes/diagnoses(primary or secondary)? CHF   Week 2 attempt successful? No   Unsuccessful attempts Attempt 2          TISH BILLS - Registered Nurse

## 2022-04-13 ENCOUNTER — READMISSION MANAGEMENT (OUTPATIENT)
Dept: CALL CENTER | Facility: HOSPITAL | Age: 77
End: 2022-04-13

## 2022-04-13 NOTE — OUTREACH NOTE
CHF Week 3 Survey    Flowsheet Row Responses   [facility] [place] patient discharged from? [facility]   Does the patient have one of the following disease processes/diagnoses(primary or secondary)? CHF   Week 3 attempt successful? Yes   Call start time 1214   Call end time 1221   Discharge diagnosis New onset atrial fibrillation   Meds reviewed with patient/caregiver? Yes   Is the patient having any side effects they believe may be caused by any medication additions or changes? No   Does the patient have all medications ordered at discharge? Yes   Is the patient taking all medications as directed (includes completed medication regime)? Yes   Does the patient have a primary care provider?  Yes   Does the patient have an appointment with their PCP within 7 days of discharge? Yes   Comments regarding PCP PCP 3/24/22   What is preventing the patient from scheduling follow up appointments within 7 days of discharge? Earlier appointment not available   Nursing Interventions Verified appointment date/time/provider   Has the patient kept scheduled appointments due by today? Yes   Has home health visited the patient within 72 hours of discharge? N/A   Pulse Ox monitoring None   Psychosocial issues? No   Did the patient receive a copy of their discharge instructions? Yes   Nursing interventions Reviewed instructions with patient   What is the patient's perception of their health status since discharge? Improving   Nursing interventions Nurse provided patient education   Is the patient weighing daily? Yes   Does the patient have scales? Yes   Daily weight interventions Education provided on importance of daily weight   Is the patient able to teach back Heart Failure diet management? Yes   Is the patient able to teach back Heart Failure Zones? Yes   Is the patient able to teach back signs and symptoms of worsening condition? (i.e. weight gain, shortness of air, etc.) Yes   If the patient is a current smoker, are they able to teach  back resources for cessation? Not a smoker   Is the patient/caregiver able to teach back the hierarchy of who to call/visit for symptoms/problems? PCP, Specialist, Home health nurse, Urgent Care, ED, 911 Yes   CHF Week 3 call completed? Yes          NEGRITO MCCOLLUM - Registered Nurse

## 2022-04-19 ENCOUNTER — OFFICE VISIT (OUTPATIENT)
Dept: CARDIOLOGY | Facility: CLINIC | Age: 77
End: 2022-04-19

## 2022-04-19 VITALS
SYSTOLIC BLOOD PRESSURE: 140 MMHG | DIASTOLIC BLOOD PRESSURE: 78 MMHG | OXYGEN SATURATION: 98 % | HEART RATE: 62 BPM | HEIGHT: 63 IN | BODY MASS INDEX: 28.53 KG/M2 | WEIGHT: 161 LBS

## 2022-04-19 DIAGNOSIS — I10 PRIMARY HYPERTENSION: ICD-10-CM

## 2022-04-19 DIAGNOSIS — I50.32 DIASTOLIC DYSFUNCTION WITH CHRONIC HEART FAILURE: Primary | ICD-10-CM

## 2022-04-19 PROCEDURE — 99214 OFFICE O/P EST MOD 30 MIN: CPT | Performed by: INTERNAL MEDICINE

## 2022-04-19 RX ORDER — ATORVASTATIN CALCIUM 20 MG/1
20 TABLET, FILM COATED ORAL DAILY
Qty: 90 TABLET | Refills: 1 | Status: SHIPPED | OUTPATIENT
Start: 2022-04-19 | End: 2022-10-14

## 2022-04-19 NOTE — PROGRESS NOTES
[facility]  Office visit  Divine Banuelos  1945  [phone]    VISIT DATE:  4/19/2022      PCP: Queenie Amezcua, APRN  [address] [facility] [address]    CC:  Chief Complaint   Patient presents with   • Atrial Fibrillation   • Shortness of Breath       PROBLEM LIST:  1. Hypertension:  a.  History of elevated blood pressure due to stress.  b.  Discontinuation of enalapril secondary to hypotension, June 2013.  2. Bradycardia-Holter October 2017: Average heart rate 52 bpm, range of 36-94 bpm. Asymptomatic  3. Hypothyroidism.  4. Depression.  5. Anxiety.  6. Osteoporosis, mild.  7. Mild dyslipidemia.  8. History of Bell’s palsy.  9. Surgical history:  a. Bilateral cataract extraction.    Cardiac studies and procedures:  September 2019  Echo  · Estimated EF appears to be in the range of 56 - 60%.  · Left ventricular diastolic dysfunction (grade II) consistent with pseudonormalization.  · Left atrial cavity size is borderline dilated.  · Mild mitral valve regurgitation is present  Exercise myocardial perfusion imaging  · A stress test was performed following the Fredis protocol.  · The patient reported shortness of breath during the stress test.  · Blood pressure demonstrated a hypertensive response to stress. (215/97).  · There was no ST segment deviation noted during stress.  · Overall, the patient's exercise capacity was normal. JOSE MARIA%: 0/1.  · Left ventricular ejection fraction is normal (Calculated EF = 66%).  · Myocardial perfusion imaging indicates a normal myocardial perfusion study with no evidence of ischemia.  · Impressions are consistent with a low risk study.    March 2022  TTE   Normal left ventricle systolic function with an estimated ejection fraction    of 55-60%.    There is grade 3 diastolic dysfunction present.    The left atrium is moderately dilated.    Mild mitral regurgitation is present.    Mild tricuspid regurgitation is present.    Estimated pulmonary artery  systolic pressure is mildly elevated at 37 mmHg.     MRI brain  1. Acute cortical infarction 8 x 4 mm involving the left precentral gyrus     2. Progression of mild cerebral white matter disease, most likely sequela chronic small vessel ischemia. Differential diagnosis also includes but is not limited to: demyelinating disease including multiple sclerosis and Lyme disease, migraine headache   effect, vasculitis, trauma and gliosis.    CTA head neck  1.  No significant stenosis or aneurysm.   2.  No CT correlate for the punctate area of acute ischemia in the left precentral gyrus seen on MRI.   1.  Mild bilateral proximal cervical internal carotid artery stenosis.    ASSESSMENT:   Diagnosis Plan   1. Diastolic dysfunction with chronic heart failure (HCC)     2. Primary hypertension         PLAN:  Congestive heart failure, diastolic, chronic: Currently with mild class II symptoms.  No evidence of volume overload.  Continue regular aerobic exercise.  Afterload well controlled.  We will continue to trend symptoms.    Blood pressure lability: Blood pressures currently well controlled.    Sinus bradycardia: Holter monitor consistent with underlying sick sinus physiology, currently asymptomatic. Continue annual clinical follow-up. Currently no indication for pacing.     Hyperlipidemia: Mild elevation in LDL, excellent HDL and triglyceride levels.  Continue dietary modifications and regular exercise.  Not recommending further pharmacologic therapy at this time.    Atrial fibrillation, paroxysmal: Diagnosed after CVA in March 2020.  [id number] equal to 7.  Continue Eliquis 5 mg p.o. twice daily.  Continue as needed beta-blockade.  Not recommending antiarrhythmic therapy at this time.  Recommending discontinuation of low-dose aspirin and fish oil supplementation.    Subjective  Continues to maintain an active lifestyle.  She is able to mow her yard without any limitation.  She reports that her blood pressures are running  "less than 130/80 mmHg at home.  She has not had to take as needed antihypertensive medications.  Reviewed recent lipid profile.    PHYSICAL EXAMINATION:  Vitals:    04/19/22 1408   BP: 140/78   BP Location: Right arm   Patient Position: Sitting   Pulse: 62   SpO2: 98%   Weight: 73 kg (161 lb)   Height: 160 cm (63\")     General Appearance:    Alert, cooperative, no distress, appears stated age   Head:    Normocephalic, without obvious abnormality, atraumatic   Eyes:    conjunctiva/corneas clear   Nose:   Nares normal, septum midline, mucosa normal, no drainage   Throat:   Lips, teeth and gums normal   Neck:   Supple, symmetrical, trachea midline, no carotid    bruit or JVD   Lungs:     Clear to auscultation bilaterally, respirations unlabored   Chest Wall:    No tenderness or deformity    Heart:    Regular rate and rhythm, S1 and S2 normal, no murmur, rub   or gallop, normal carotid impulse bilaterally without bruit.   Abdomen:     Soft, non-tender   Extremities:   Extremities normal, atraumatic, no cyanosis or edema   Pulses:   2+ and symmetric all extremities   Skin:   Skin color, texture, turgor normal, no rashes or lesions       Diagnostic Data:  Procedures  Lab Results   Component Value Date    CHLPL 246 (H) 03/18/2022    TRIG 106 03/18/2022    HDL 70 (H) 03/18/2022     Lab Results   Component Value Date    GLUCOSE 99 03/20/2022    BUN 8 03/20/2022    CREATININE 0.77 03/20/2022     03/20/2022    K 3.6 03/20/2022     03/20/2022    CO2 23.0 03/20/2022     Lab Results   Component Value Date    HGBA1C 5.4 03/18/2022     Lab Results   Component Value Date    WBC 6.27 03/20/2022    HGB 14.0 03/20/2022    HCT 41.9 03/20/2022     03/20/2022       Allergies  No Known Allergies    Current Medications    Current Outpatient Medications:   •  apixaban (ELIQUIS) 5 MG tablet tablet, Take 1 tablet by mouth Every 12 (Twelve) Hours. Indications: Atrial Fibrillation, Disp: 60 tablet, Rfl: 0  •  atorvastatin " (LIPITOR) 20 MG tablet, Take 1 tablet by mouth Daily. (Patient taking differently: Take 80 mg by mouth Daily.), Disp: 30 tablet, Rfl: 0  •  Calcium Citrate (CITRACAL PO), Take 1 tablet by mouth Daily. + Magnesium, Disp: , Rfl:   •  estradiol (ESTRACE VAGINAL) 0.1 MG/GM vaginal cream, Massage pea size amount into vaginal opening twice weekly, Disp: 42.5 g, Rfl: 1  •  ipratropium (ATROVENT) 0.06 % nasal spray, 1 spray into the nostril(s) as directed by provider As Needed., Disp: , Rfl:   •  levothyroxine (SYNTHROID, LEVOTHROID) 50 MCG tablet, Take 50 mcg by mouth Daily., Disp: , Rfl:   •  metoprolol tartrate (LOPRESSOR) 25 MG tablet, Take 0.25 tablets by mouth As Needed (Sustained HR > 130 over 1 hour). Patient is to take 6.25 mg every 4-6 hours prn for sustained HR > 130 over 1 hour- Please divide tablet if possible prior to patient picking up., Disp: 10 tablet, Rfl: 0  •  Misc Natural Products (OSTEO BI-FLEX TRIPLE STRENGTH PO), Take 1 tablet by mouth 2 (Two) Times a Day., Disp: , Rfl:   •  Omega-3 Fatty Acids (FISH OIL) 1200 MG capsule capsule, Take 1,200 mg by mouth 2 (two) times a day., Disp: , Rfl:   •  PARoxetine (PAXIL) 10 MG tablet, Take 10 mg by mouth 2 (Two) Times a Day., Disp: , Rfl:   •  ZINC SULFATE PO, Take 1 tablet by mouth Daily., Disp: , Rfl:           ROS  Review of Systems   Cardiovascular: Positive for dyspnea on exertion. Negative for chest pain, irregular heartbeat and near-syncope.   Respiratory: Positive for shortness of breath. Negative for cough.        SOCIAL HX  Social History     Socioeconomic History   • Marital status:    Tobacco Use   • Smoking status: Never Smoker   • Smokeless tobacco: Never Used   Vaping Use   • Vaping Use: Never used   Substance and Sexual Activity   • Alcohol use: No   • Drug use: No   • Sexual activity: Defer     Birth control/protection: Post-menopausal       FAMILY HX  Family History   Problem Relation Age of Onset   • Hypertension Mother    • Colon  cancer Father    • Colon cancer Paternal Grandmother    • Colon cancer Paternal Uncle    • Breast cancer Maternal Aunt    • Thyroid disease Other         Nephew             Reinaldo Cope III, MD, FACC

## 2022-05-02 ENCOUNTER — TELEPHONE (OUTPATIENT)
Dept: CARDIOLOGY | Facility: CLINIC | Age: 77
End: 2022-05-02

## 2022-05-02 NOTE — TELEPHONE ENCOUNTER
Patient called to report that she has been itching all over her body (with the exception of her legs and feet) for about 2.5 weeks.     She was started on Eliquis, metoprolol and atorvastatin 3/19/22. She thinks it is one of those causing her symptoms. Did you want her to hold her atorvastatin to see if symptoms improve?

## 2022-05-03 NOTE — TELEPHONE ENCOUNTER
Advised patient to hold Atorvastatin to see if itching improves and to report back to us at the end of the week. Patient verbalized understanding.

## 2022-05-06 NOTE — TELEPHONE ENCOUNTER
Patient states she's continuing to itch even after holding Atorvastatin.  Suggested patient try benadryl and to contact PCP. She refused benadryl. States she has been using a compound cream and bath oil to help alleviate s/s with minimal relief.

## 2022-05-07 ENCOUNTER — HOSPITAL ENCOUNTER (EMERGENCY)
Facility: HOSPITAL | Age: 77
Discharge: HOME | End: 2022-05-07
Attending: EMERGENCY MEDICINE | Admitting: EMERGENCY MEDICINE

## 2022-05-07 VITALS
SYSTOLIC BLOOD PRESSURE: 197 MMHG | OXYGEN SATURATION: 97 % | HEIGHT: 63 IN | RESPIRATION RATE: 20 BRPM | DIASTOLIC BLOOD PRESSURE: 80 MMHG | WEIGHT: 158 LBS | BODY MASS INDEX: 28 KG/M2 | HEART RATE: 50 BPM | TEMPERATURE: 97.8 F

## 2022-05-07 DIAGNOSIS — I10 ELEVATED BLOOD PRESSURE READING WITH DIAGNOSIS OF HYPERTENSION: Primary | ICD-10-CM

## 2022-05-07 DIAGNOSIS — R21 RASH AND NONSPECIFIC SKIN ERUPTION: ICD-10-CM

## 2022-05-07 DIAGNOSIS — L29.9 PRURITUS: ICD-10-CM

## 2022-05-07 LAB
ALBUMIN SERPL-MCNC: 4 G/DL (ref 3.5–5.2)
ALBUMIN/GLOB SERPL: 1.4 G/DL
ALP SERPL-CCNC: 118 U/L (ref 39–117)
ALT SERPL W P-5'-P-CCNC: 12 U/L (ref 1–33)
ANION GAP SERPL CALCULATED.3IONS-SCNC: 12.5 MMOL/L (ref 5–15)
AST SERPL-CCNC: 17 U/L (ref 1–32)
BASOPHILS # BLD AUTO: 0.04 10*3/MM3 (ref 0–0.2)
BASOPHILS NFR BLD AUTO: 0.6 % (ref 0–1.5)
BILIRUB SERPL-MCNC: 0.4 MG/DL (ref 0–1.2)
BUN SERPL-MCNC: 11 MG/DL (ref 8–23)
BUN/CREAT SERPL: 15.1 (ref 7–25)
CALCIUM SPEC-SCNC: 9.7 MG/DL (ref 8.6–10.5)
CHLORIDE SERPL-SCNC: 102 MMOL/L (ref 98–107)
CO2 SERPL-SCNC: 23.5 MMOL/L (ref 22–29)
CREAT SERPL-MCNC: 0.73 MG/DL (ref 0.57–1)
DEPRECATED RDW RBC AUTO: 42.5 FL (ref 37–54)
EGFRCR SERPLBLD CKD-EPI 2021: 84.8 ML/MIN/1.73
EOSINOPHIL # BLD AUTO: 0.28 10*3/MM3 (ref 0–0.4)
EOSINOPHIL NFR BLD AUTO: 4.5 % (ref 0.3–6.2)
ERYTHROCYTE [DISTWIDTH] IN BLOOD BY AUTOMATED COUNT: 13.1 % (ref 12.3–15.4)
GLOBULIN UR ELPH-MCNC: 2.8 GM/DL
GLUCOSE SERPL-MCNC: 96 MG/DL (ref 65–99)
HCT VFR BLD AUTO: 39.2 % (ref 34–46.6)
HGB BLD-MCNC: 13 G/DL (ref 12–15.9)
IMM GRANULOCYTES # BLD AUTO: 0.01 10*3/MM3 (ref 0–0.05)
IMM GRANULOCYTES NFR BLD AUTO: 0.2 % (ref 0–0.5)
LYMPHOCYTES # BLD AUTO: 1.56 10*3/MM3 (ref 0.7–3.1)
LYMPHOCYTES NFR BLD AUTO: 25.3 % (ref 19.6–45.3)
MCH RBC QN AUTO: 29.4 PG (ref 26.6–33)
MCHC RBC AUTO-ENTMCNC: 33.2 G/DL (ref 31.5–35.7)
MCV RBC AUTO: 88.7 FL (ref 79–97)
MONOCYTES # BLD AUTO: 0.63 10*3/MM3 (ref 0.1–0.9)
MONOCYTES NFR BLD AUTO: 10.2 % (ref 5–12)
NEUTROPHILS NFR BLD AUTO: 3.64 10*3/MM3 (ref 1.7–7)
NEUTROPHILS NFR BLD AUTO: 59.2 % (ref 42.7–76)
NRBC BLD AUTO-RTO: 0 /100 WBC (ref 0–0.2)
PLATELET # BLD AUTO: 298 10*3/MM3 (ref 140–450)
PMV BLD AUTO: 9.3 FL (ref 6–12)
POTASSIUM SERPL-SCNC: 4 MMOL/L (ref 3.5–5.2)
PROT SERPL-MCNC: 6.8 G/DL (ref 6–8.5)
RBC # BLD AUTO: 4.42 10*6/MM3 (ref 3.77–5.28)
SODIUM SERPL-SCNC: 138 MMOL/L (ref 136–145)
WBC NRBC COR # BLD: 6.16 10*3/MM3 (ref 3.4–10.8)

## 2022-05-07 PROCEDURE — 99283 EMERGENCY DEPT VISIT LOW MDM: CPT

## 2022-05-07 PROCEDURE — 36415 COLL VENOUS BLD VENIPUNCTURE: CPT

## 2022-05-07 PROCEDURE — 80053 COMPREHEN METABOLIC PANEL: CPT | Performed by: PHYSICIAN ASSISTANT

## 2022-05-07 PROCEDURE — 85025 COMPLETE CBC W/AUTO DIFF WBC: CPT | Performed by: PHYSICIAN ASSISTANT

## 2022-05-07 RX ORDER — AMLODIPINE BESYLATE 5 MG/1
5 TABLET ORAL
Status: DISCONTINUED | OUTPATIENT
Start: 2022-05-07 | End: 2022-05-07 | Stop reason: HOSPADM

## 2022-05-07 RX ORDER — HYDROXYZINE PAMOATE 50 MG/1
50 CAPSULE ORAL ONCE
Status: COMPLETED | OUTPATIENT
Start: 2022-05-07 | End: 2022-05-07

## 2022-05-07 RX ORDER — HYDROXYZINE HYDROCHLORIDE 25 MG/1
25 TABLET, FILM COATED ORAL 3 TIMES DAILY PRN
Qty: 9 TABLET | Refills: 0 | Status: SHIPPED | OUTPATIENT
Start: 2022-05-07 | End: 2022-05-10

## 2022-05-07 RX ORDER — TRIAMCINOLONE ACETONIDE 1 MG/G
1 CREAM TOPICAL EVERY 12 HOURS SCHEDULED
Status: DISCONTINUED | OUTPATIENT
Start: 2022-05-07 | End: 2022-05-07 | Stop reason: HOSPADM

## 2022-05-07 RX ORDER — AMLODIPINE BESYLATE 5 MG/1
5 TABLET ORAL DAILY
Qty: 14 TABLET | Refills: 0 | Status: SHIPPED | OUTPATIENT
Start: 2022-05-07 | End: 2022-05-21

## 2022-05-07 RX ADMIN — AMLODIPINE BESYLATE 5 MG: 5 TABLET ORAL at 14:55

## 2022-05-07 RX ADMIN — HYDROXYZINE PAMOATE 50 MG: 50 CAPSULE ORAL at 13:26

## 2022-05-07 RX ADMIN — TRIAMCINOLONE ACETONIDE 1 APPLICATION: 1 CREAM TOPICAL at 13:26

## 2022-05-07 NOTE — DISCHARGE INSTRUCTIONS
Keep rash clean with soap and water.  Use the steroid cream as directed.  You can use the Vistaril to help with itching use topical Benadryl cream, calamine lotion, etc.  Try to be mindful of any of the products you are putting on your skin to see if this is worsening or rashes.  Take your medications as directed.  Continue to monitor your blood pressure and keep a log.  You can take the Norvasc as directed to help regulate the blood pressure.  Try to follow-up with your primary care provider in the next 1 to 2 days to reevaluate symptoms, blood pressure.  They may need to make medication changes.  You can follow-up with dermatology regarding the rash, they may need to do further evaluation.  Return to the ER for any change, worsening symptoms, or any additional concerns including but not limited to persistent elevation of blood pressure with vision loss or changes, dizziness, chest pain, shortness of breath, rash or lesions of the mouth, fever greater than 100.4.

## 2022-05-07 NOTE — ED PROVIDER NOTES
"Subjective   Patient is a 76-year-old female with history of anxiety, arthritis, Bell's palsy, depression, GERD, hyperlipidemia, hypertension, hypothyroidism, malignant neoplasm of right breast, mitral valve prolapse and osteoporosis presenting to the ER for evaluation of itching.  Patient states for over a month she has been itching.  She states is mostly on her scalp and her trunk.  She has noticed red lesions on her neck, chest and lower abdomen.  She denies any animals, bug bites.  Reportedly she has been in contact with her cardiologist and they took her off a statin and it looks as if there was a note yesterday stating they were going to change her anticoagulation as well.  She denies any fever, chills, headache, vision loss or changes, chest pain, cough, shortness of breath, joint swelling, abdominal pain, nausea, emesis, change in bowel movements, or any other symptoms.  She has not taken any Benadryl.  She states she has used some zinc cream, \"Skin So Soft\" which helps for a bit but itching persists.  She denies any new lotions, detergents, soaps, etc. that she can remember.          Review of Systems   Constitutional: Negative for chills and fever.   HENT: Negative.    Eyes: Negative.    Respiratory: Negative.    Cardiovascular: Negative.    Gastrointestinal: Negative.    Genitourinary: Negative.    Musculoskeletal: Negative.    Skin: Positive for rash.   Neurological: Negative.    Psychiatric/Behavioral: Negative.        Past Medical History:   Diagnosis Date   • Anxiety    • Arthritis    • Bell's palsy    • Bronchitis    • Cataract     bilateral   • Depression    • GERD (gastroesophageal reflux disease)    • History of exercise stress test 2019    patient states it was normal   • Hyperlipidemia    • Hypertension     not currently taking medication for BP   • Hypothyroidism    • Malignant neoplasm of upper-outer quadrant of right breast in female, estrogen receptor positive (HCC) 10/30/2020   • Mitral " "valve prolapse    • Osteoporosis    • Pessary maintenance    • Wears dentures     upper and lowers       No Known Allergies    Past Surgical History:   Procedure Laterality Date   • BREAST LUMPECTOMY WITH SENTINEL NODE BIOPSY Right 11/4/2020    Procedure: BREAST LUMPECTOMY WITH SENTINEL NODE BIOPSY RIGHT;  Surgeon: Stuart Caceres MD;  Location: [facility];  Service: General;  Laterality: Right;   • CATARACT EXTRACTION Bilateral    • CATARACT EXTRACTION, BILATERAL     • COLONOSCOPY     • RETINAL DETACHMENT REPAIR Right        Family History   Problem Relation Age of Onset   • Hypertension Mother    • Colon cancer Father    • Colon cancer Paternal Grandmother    • Colon cancer Paternal Uncle    • Breast cancer Maternal Aunt    • Thyroid disease Other         Nephew       Social History     Socioeconomic History   • Marital status:    Tobacco Use   • Smoking status: Never Smoker   • Smokeless tobacco: Never Used   Vaping Use   • Vaping Use: Never used   Substance and Sexual Activity   • Alcohol use: No   • Drug use: No   • Sexual activity: Defer     Birth control/protection: Post-menopausal           Objective   Physical Exam  Vitals and nursing note reviewed.     BP (!) 197/80 (BP Location: Left arm, Patient Position: Lying) Comment: Patient given amlodipine and told to monitor Bp home and come to ER if it doesn't go down or have any symptoms she's concerned about.  Pulse 50   Temp 97.8 °F (36.6 °C) (Oral)   Resp 20   Ht 160 cm (63\")   Wt 71.7 kg (158 lb)   LMP  (LMP Unknown)   SpO2 97%   BMI 27.99 kg/m²     GEN: No acute distress, sitting upright in the stretcher.  Awake and alert.  Does not appear septic or toxic.  She is answering questions appropriately.  Skin: Patient has diffuse erythematous scabbed areas over her anterior chest, lower abdomen that differ in size.  No surrounding erythema, lesions cross midline.  Head: Normocephalic, atraumatic  Eyes: EOM intact  ENT: Posterior pharynx normal in " appearance, oral mucosa is moist, no intraoral lesions.  Lips are symmetric.  Nares are patent.  Chest: Nontender to palpation  Cardiovascular: Bradycardic, regular rhythm  Lungs: Clear to auscultation bilaterally without adventitious sounds  Abdomen: Nondistended.  Bowel sounds present.  No guarding or tenderness, lesions as noted above  Extremities: No edema, normal appearance  Neuro: GCS 15  Psych: Mood and affect are appropriate    Procedures           ED Course  ED Course as of 05/07/22 1640   Sat May 07, 2022   1325 WBC: 6.16 [LA]   1348 Hemoglobin: 13.0 [LA]   1348 Platelets: 298 [LA]   1404 Glucose: 96 [LA]   1404 BUN: 11 [LA]   1404 Creatinine: 0.73 [LA]   1404 Sodium: 138 [LA]   1404 Potassium: 4.0 [LA]   1404 Chloride: 102 [LA]   1404 CO2: 23.5 [LA]   1404 Calcium: 9.7 [LA]   1404 Total Protein: 6.8 [LA]   1404 Albumin: 4.00 [LA]   1404 ALT (SGPT): 12 [LA]   1404 AST (SGOT): 17 [LA]   1404 Alkaline Phosphatase(!): 118 [LA]   1404 Total Bilirubin: 0.4 [LA]   1404 Globulin: 2.8 [LA]   1404 A/G Ratio: 1.4 [LA]   1404 BUN/Creatinine Ratio: 15.1 [LA]   1404 Anion Gap: 12.5 [LA]   1404 eGFR: 84.8 [LA]   1404 Per chart review, patient had hypertensive emergency in March and was admitted, also had A. fib with RVR, at that time they had started her on Norvasc and then switched to a beta-blocker.  She is on anticoagulation.  She has no symptoms today at this time but do believe with her blood pressure being this high she needs to be started on medication with close follow-up with a primary care provider.  Discussed with Dr. Connelly, We will go ahead and restart norvasc.  Discussed with the patient.  She states her blood pressures normally run in the 140s at home.  We will go ahead and write a prescription to have her closely follow-up with her primary care provider and keep a log of blood pressures at her home. [LA]      ED Course User Index  [LA] Theresa Herrera PA-C                                                  MDM  Number of Diagnoses or Management Options  Elevated blood pressure reading with diagnosis of hypertension  Pruritus  Rash and nonspecific skin eruption  Diagnosis management comments: On arrival, patient is hypertensive, afebrile.  It appears that this rash has been ongoing for over a month.  She does have excoriated lesions over her chest and abdomen that cross midline.  Differential could include contact dermatitis, viral illness, and other concerns.  Does not necessarily appear to be a drug rash, does not appear to be life concerning rash such as SJS, TENS meningococcemia.  Does not follow a pattern of herpes zoster. Patient has no obvious scleral icterus to suggest transaminitis but will obtain basic labs.  Will give Vistaril and topical steroids.  Discussed with patient that she may need dermatology follow-up.    Patient's labs are stable.  No leukocytosis or significant anemia.  There is no transaminitis.     On reevaluation blood pressure, it remained elevated.  She states at home it normally runs 130s to 140s.  Per chart review, patient is only on a beta-blocker, statin and anticoagulation in regards to blood pressure and history of stroke.  Per chart review, patient has been admitted once before for hypertensive emergency and they had had her on Norvasc and changed to beta-blocker.  She has a way to check blood pressure at home.  Discussed with Dr. Connelly.  We will go ahead and restart her on Norvasc and have her keep a log of her blood pressure.  We will call in some hydroxyzine, have her continue the triamcinolone as well. Discussed follow-up with primary care provider, need for dermatology if rash persist.  Discussed very strict return precautions.  She verbalized understanding was in agreement with this plan of care.       Amount and/or Complexity of Data Reviewed  Clinical lab tests: reviewed and ordered  Review and summarize past medical records: yes  Discuss the patient with other  providers: yes    Risk of Complications, Morbidity, and/or Mortality  Presenting problems: moderate  Diagnostic procedures: moderate  Management options: low    Patient Progress  Patient progress: stable      Final diagnoses:   Elevated blood pressure reading with diagnosis of hypertension   Rash and nonspecific skin eruption   Pruritus       ED Disposition  ED Disposition     ED Disposition   Discharge    Condition   Stable    Comment   --             Queenie Amezcua, APRN  [address] [facility] [id number] [phone]    Schedule an appointment as soon as possible for a visit       [facility] [place] [address] [place] [id number] [phone]  Schedule an appointment as soon as possible for a visit            Medication List      New Prescriptions    amLODIPine 5 MG tablet  Commonly known as: NORVASC  Take 1 tablet by mouth Daily for 14 days.     hydrOXYzine 25 MG tablet  Commonly known as: ATARAX  Take 1 tablet by mouth 3 (Three) Times a Day As Needed for Itching for up to 3 days.           Where to Get Your Medications      These medications were sent to [id number] - [place], [place] - [address] [place] [address] AT [facility] - [phone]  - [phone]   [address] [facility] [address]    Phone: [phone]   · amLODIPine 5 MG tablet  · hydrOXYzine 25 MG tablet          Theresa Herrera PA-C  05/07/22 1638       Theresa Herrera PA-C  05/07/22 1639       Theresa Herrera PA-C  05/07/22 [id number]

## 2022-05-09 NOTE — TELEPHONE ENCOUNTER
"Called patient to make her aware of Dr. Cope's recommendations.   Patient reluctant to change her medications stating \" I don't want to have to make any changes or take any new medications.\"  Patient also stated that she has tried a new laundry product and thinks maybe that has something to do with her itching.  Suggested to patient to withhold using new laundry product for the next few days and to report worsening or improving symptoms before changing medications. Patient agreeable.   "

## 2022-05-10 ENCOUNTER — HOSPITAL ENCOUNTER (OUTPATIENT)
Facility: HOSPITAL | Age: 77
Discharge: HOME | End: 2022-05-10
Payer: MEDICARE

## 2022-05-10 LAB
A/G RATIO: 1.5 (ref 0.8–2)
ALBUMIN SERPL-MCNC: 4.2 G/DL (ref 3.4–4.8)
ALP BLD-CCNC: 119 U/L (ref 25–100)
ALT SERPL-CCNC: 16 U/L (ref 4–36)
ANION GAP SERPL CALCULATED.3IONS-SCNC: 12 MMOL/L (ref 3–16)
AST SERPL-CCNC: 22 U/L (ref 8–33)
BASOPHILS ABSOLUTE: 0 K/UL (ref 0–0.1)
BASOPHILS RELATIVE PERCENT: 0.7 %
BILIRUB SERPL-MCNC: 0.4 MG/DL (ref 0.3–1.2)
BUN BLDV-MCNC: 13 MG/DL (ref 6–20)
CALCIUM SERPL-MCNC: 9.5 MG/DL (ref 8.5–10.5)
CHLORIDE BLD-SCNC: 105 MMOL/L (ref 98–107)
CHOLESTEROL, TOTAL: 207 MG/DL (ref 0–200)
CO2: 26 MMOL/L (ref 20–30)
CREAT SERPL-MCNC: 0.8 MG/DL (ref 0.4–1.2)
EOSINOPHILS ABSOLUTE: 0.3 K/UL (ref 0–0.4)
EOSINOPHILS RELATIVE PERCENT: 5.1 %
FOLATE: 12.33 NG/ML
GFR AFRICAN AMERICAN: >59
GFR NON-AFRICAN AMERICAN: >60
GLOBULIN: 2.8 G/DL
GLUCOSE BLD-MCNC: 99 MG/DL (ref 74–106)
HCT VFR BLD CALC: 42.7 % (ref 37–47)
HDLC SERPL-MCNC: 69 MG/DL (ref 40–60)
HEMOGLOBIN: 13.2 G/DL (ref 11.5–16.5)
IMMATURE GRANULOCYTES #: 0 K/UL
IMMATURE GRANULOCYTES %: 0.3 % (ref 0–5)
LDL CHOLESTEROL CALCULATED: 125 MG/DL
LYMPHOCYTES ABSOLUTE: 1.4 K/UL (ref 1.5–4)
LYMPHOCYTES RELATIVE PERCENT: 23.8 %
MCH RBC QN AUTO: 28.6 PG (ref 27–32)
MCHC RBC AUTO-ENTMCNC: 30.9 G/DL (ref 31–35)
MCV RBC AUTO: 92.6 FL (ref 80–100)
MONOCYTES ABSOLUTE: 0.6 K/UL (ref 0.2–0.8)
MONOCYTES RELATIVE PERCENT: 9.6 %
NEUTROPHILS ABSOLUTE: 3.7 K/UL (ref 2–7.5)
NEUTROPHILS RELATIVE PERCENT: 60.5 %
PDW BLD-RTO: 13.4 % (ref 11–16)
PLATELET # BLD: 314 K/UL (ref 150–400)
PMV BLD AUTO: 10.3 FL (ref 6–10)
POTASSIUM SERPL-SCNC: 4.8 MMOL/L (ref 3.4–5.1)
RBC # BLD: 4.61 M/UL (ref 3.8–5.8)
SODIUM BLD-SCNC: 143 MMOL/L (ref 136–145)
TOTAL PROTEIN: 7 G/DL (ref 6.4–8.3)
TRIGL SERPL-MCNC: 65 MG/DL (ref 0–249)
TSH SERPL DL<=0.05 MIU/L-ACNC: 3.24 UIU/ML (ref 0.27–4.2)
VITAMIN B-12: 341 PG/ML (ref 211–911)
VLDLC SERPL CALC-MCNC: 13 MG/DL
WBC # BLD: 6 K/UL (ref 4–11)

## 2022-05-10 PROCEDURE — 82746 ASSAY OF FOLIC ACID SERUM: CPT

## 2022-05-10 PROCEDURE — 36415 COLL VENOUS BLD VENIPUNCTURE: CPT

## 2022-05-10 PROCEDURE — 85025 COMPLETE CBC W/AUTO DIFF WBC: CPT

## 2022-05-10 PROCEDURE — 82607 VITAMIN B-12: CPT

## 2022-05-10 PROCEDURE — 80061 LIPID PANEL: CPT

## 2022-05-10 PROCEDURE — 80053 COMPREHEN METABOLIC PANEL: CPT

## 2022-05-10 PROCEDURE — 84443 ASSAY THYROID STIM HORMONE: CPT

## 2022-05-12 ENCOUNTER — OFFICE VISIT (OUTPATIENT)
Dept: OBSTETRICS AND GYNECOLOGY | Facility: CLINIC | Age: 77
End: 2022-05-12

## 2022-05-12 VITALS — BODY MASS INDEX: 28.41 KG/M2 | SYSTOLIC BLOOD PRESSURE: 138 MMHG | DIASTOLIC BLOOD PRESSURE: 64 MMHG | WEIGHT: 160.4 LBS

## 2022-05-12 DIAGNOSIS — R82.90 ABNORMAL URINE FINDINGS: ICD-10-CM

## 2022-05-12 DIAGNOSIS — T83.9XXA PROBLEM WITH VAGINAL PESSARY, INITIAL ENCOUNTER: Primary | ICD-10-CM

## 2022-05-12 DIAGNOSIS — N93.9 VAGINAL BLEEDING: ICD-10-CM

## 2022-05-12 DIAGNOSIS — N89.8 VAGINAL GRANULATION TISSUE: ICD-10-CM

## 2022-05-12 LAB
BILIRUB BLD-MCNC: NEGATIVE MG/DL
CLARITY, POC: CLEAR
COLOR UR: YELLOW
EXPIRATION DATE: ABNORMAL
GLUCOSE UR STRIP-MCNC: NEGATIVE MG/DL
KETONES UR QL: NEGATIVE
LEUKOCYTE EST, POC: ABNORMAL
Lab: ABNORMAL
NITRITE UR-MCNC: NEGATIVE MG/ML
PH UR: 5 [PH] (ref 5–8)
PROT UR STRIP-MCNC: ABNORMAL MG/DL
RBC # UR STRIP: ABNORMAL /UL
SP GR UR: 1.03 (ref 1–1.03)
UROBILINOGEN UR QL: NORMAL

## 2022-05-12 PROCEDURE — 99213 OFFICE O/P EST LOW 20 MIN: CPT | Performed by: PHYSICIAN ASSISTANT

## 2022-05-12 PROCEDURE — 17250 CHEM CAUT OF GRANLTJ TISSUE: CPT | Performed by: PHYSICIAN ASSISTANT

## 2022-05-12 PROCEDURE — 81003 URINALYSIS AUTO W/O SCOPE: CPT | Performed by: PHYSICIAN ASSISTANT

## 2022-05-12 RX ORDER — NITROFURANTOIN 25; 75 MG/1; MG/1
100 CAPSULE ORAL 2 TIMES DAILY
Qty: 10 CAPSULE | Refills: 0 | Status: SHIPPED | OUTPATIENT
Start: 2022-05-12 | End: 2022-05-17

## 2022-05-12 NOTE — PROGRESS NOTES
Subjective   Chief Complaint   Patient presents with   • Follow-up     Patient complains of vaginal bleeding with pessary, also complains of some abdominal cramping.       Divine Banuelos is a 76 y.o. year old  presenting to be seen for vaginal bleeding.  She is also having some lower pelvic cramping.  Patient has been using a pessary for quite some time and has not had any complications with her pessary.  Reports the bleeding she noted about a month ago and has been off and on and is noted mostly with a bowel movement but she feels that it is from the vagina.  She has not had any heavy bleeding.  She is not experiencing any dysuria.  No problems with constipation    Past Medical History:   Diagnosis Date   • Anxiety    • Arthritis    • Bell's palsy    • Bronchitis    • Cataract     bilateral   • Depression    • GERD (gastroesophageal reflux disease)    • History of exercise stress test     patient states it was normal   • Hyperlipidemia    • Hypertension     not currently taking medication for BP   • Hypothyroidism    • Malignant neoplasm of upper-outer quadrant of right breast in female, estrogen receptor positive (HCC) 10/30/2020   • Mitral valve prolapse    • Osteoporosis    • Pessary maintenance    • Wears dentures     upper and lowers        Current Outpatient Medications:   •  amLODIPine (NORVASC) 5 MG tablet, Take 1 tablet by mouth Daily for 14 days., Disp: 14 tablet, Rfl: 0  •  apixaban (ELIQUIS) 5 MG tablet tablet, Take 1 tablet by mouth Every 12 (Twelve) Hours. Indications: Atrial Fibrillation, Disp: 60 tablet, Rfl: 1  •  Calcium Citrate (CITRACAL PO), Take 1 tablet by mouth Daily. + Magnesium, Disp: , Rfl:   •  estradiol (ESTRACE VAGINAL) 0.1 MG/GM vaginal cream, Massage pea size amount into vaginal opening twice weekly, Disp: 42.5 g, Rfl: 1  •  ipratropium (ATROVENT) 0.06 % nasal spray, 1 spray into the nostril(s) as directed by provider As Needed., Disp: , Rfl:   •  levothyroxine (SYNTHROID,  LEVOTHROID) 50 MCG tablet, Take 50 mcg by mouth Daily., Disp: , Rfl:   •  Misc Natural Products (OSTEO BI-FLEX TRIPLE STRENGTH PO), Take 1 tablet by mouth 2 (Two) Times a Day., Disp: , Rfl:   •  PARoxetine (PAXIL) 10 MG tablet, Take 10 mg by mouth 2 (Two) Times a Day., Disp: , Rfl:   •  ZINC SULFATE PO, Take 1 tablet by mouth Daily., Disp: , Rfl:   •  atorvastatin (LIPITOR) 20 MG tablet, Take 1 tablet by mouth Daily., Disp: 90 tablet, Rfl: 1  •  metoprolol tartrate (LOPRESSOR) 25 MG tablet, Take 0.25 tablets by mouth As Needed (Sustained HR > 130 over 1 hour). Patient is to take 6.25 mg every 4-6 hours prn for sustained HR > 130 over 1 hour- Please divide tablet if possible prior to patient picking up., Disp: 10 tablet, Rfl: 0  •  nitrofurantoin, macrocrystal-monohydrate, (Macrobid) 100 MG capsule, Take 1 capsule by mouth 2 (Two) Times a Day for 5 days., Disp: 10 capsule, Rfl: 0  •  Omega-3 Fatty Acids (FISH OIL) 1200 MG capsule capsule, Take 1,200 mg by mouth 2 (two) times a day., Disp: , Rfl:    No Known Allergies   Past Surgical History:   Procedure Laterality Date   • BREAST LUMPECTOMY WITH SENTINEL NODE BIOPSY Right 11/4/2020    Procedure: BREAST LUMPECTOMY WITH SENTINEL NODE BIOPSY RIGHT;  Surgeon: Stuart Caceres MD;  Location: [facility];  Service: General;  Laterality: Right;   • CATARACT EXTRACTION Bilateral    • CATARACT EXTRACTION, BILATERAL     • COLONOSCOPY     • RETINAL DETACHMENT REPAIR Right       Social History     Socioeconomic History   • Marital status:    Tobacco Use   • Smoking status: Never Smoker   • Smokeless tobacco: Never Used   Vaping Use   • Vaping Use: Never used   Substance and Sexual Activity   • Alcohol use: No   • Drug use: No   • Sexual activity: Defer     Birth control/protection: Post-menopausal      Family History   Problem Relation Age of Onset   • Hypertension Mother    • Colon cancer Father    • Colon cancer Paternal Grandmother    • Colon cancer Paternal Uncle    •  Breast cancer Maternal Aunt    • Thyroid disease Other         Nephew       Review of Systems   Constitutional: Negative for chills, diaphoresis and fever.   Gastrointestinal: Negative.    Genitourinary: Positive for pelvic pain and vaginal bleeding. Negative for difficulty urinating and dysuria.           Objective   /64   Wt 72.8 kg (160 lb 6.4 oz)   LMP  (LMP Unknown)   Breastfeeding No   BMI 28.41 kg/m²     Physical Exam  Constitutional:       Appearance: Normal appearance. She is well-developed and well-groomed.   Eyes:      General: Lids are normal.      Extraocular Movements: Extraocular movements intact.      Conjunctiva/sclera: Conjunctivae normal.   Genitourinary:     Labia:         Right: No rash, tenderness or lesion.         Left: No rash, tenderness or lesion.       Urethra: No prolapse, urethral pain, urethral swelling or urethral lesion.      Vagina: Bleeding present.      Cervix: No cervical motion tenderness, discharge, friability or lesion.      Uterus: Not enlarged and not tender.       Adnexa:         Right: No mass or tenderness.          Left: No mass or tenderness.        Comments: Ring with support pessary removed  Small rim 2 cm of granulation tissue right distal vaginal wall that is bleeding. Cauterized with silver nitrate  Pessary left out  Skin:     General: Skin is warm and dry.      Findings: No bruising or lesion.   Neurological:      Mental Status: She is alert.   Psychiatric:         Attention and Perception: Attention normal.         Mood and Affect: Mood normal.         Speech: Speech normal.         Behavior: Behavior is cooperative.            Result Review :                   Assessment and Plan  Diagnoses and all orders for this visit:    1. Problem with vaginal pessary, initial encounter (formerly [facility]) (Primary)    2. Vaginal bleeding  -     POC Urinalysis Dipstick, Automated  -     Urine Culture - Urine, Urine, Clean Catch  -     Urinalysis With Microscopic - Urine, Clean  Catch    3. Vaginal granulation tissue    4. Abnormal urine findings  -     Urine Culture - Urine, Urine, Clean Catch    Other orders  -     nitrofurantoin, macrocrystal-monohydrate, (Macrobid) 100 MG capsule; Take 1 capsule by mouth 2 (Two) Times a Day for 5 days.  Dispense: 10 capsule; Refill: 0      Patient Instructions   Will leave pessary out for 3 weeks  Patient is encouraged to modify her activity and to not be standing for long periods of time.  Also encouraged her to not lift or strain while pessary is out.  Given leukocytes in urine will start macrobid while waiting on culture results             This note was electronically signed.    Denise Hernandez PA-C   May 12, 2022

## 2022-05-12 NOTE — PATIENT INSTRUCTIONS
Will leave pessary out for 3 weeks  Patient is encouraged to modify her activity and to not be standing for long periods of time.  Also encouraged her to not lift or strain while pessary is out.  Given leukocytes in urine will start macrobid while waiting on culture results

## 2022-05-13 ENCOUNTER — TELEPHONE (OUTPATIENT)
Dept: OBSTETRICS AND GYNECOLOGY | Facility: CLINIC | Age: 77
End: 2022-05-13

## 2022-05-13 NOTE — TELEPHONE ENCOUNTER
I spoke with patient and reassured her that the bleeding should get better over the next few days. Sonali removed pessary yesterday and used silver nitrate .

## 2022-05-13 NOTE — TELEPHONE ENCOUNTER
----- Message from Jeanna More MA sent at 5/13/2022  9:26 AM EDT -----  Patient was seen in office yesterday , and is wanting to make Sonali aware that she is bleeding more today than she was before the visit.     Sonali's Patient     Thank You

## 2022-05-14 LAB
APPEARANCE UR: ABNORMAL
BACTERIA #/AREA URNS HPF: ABNORMAL /HPF
BACTERIA UR CULT: NO GROWTH
BACTERIA UR CULT: NORMAL
BILIRUB UR QL STRIP: NEGATIVE
CASTS URNS MICRO: ABNORMAL
COLOR UR: YELLOW
EPI CELLS #/AREA URNS HPF: ABNORMAL /HPF
GLUCOSE UR QL STRIP: NEGATIVE
HGB UR QL STRIP: ABNORMAL
KETONES UR QL STRIP: NEGATIVE
LEUKOCYTE ESTERASE UR QL STRIP: ABNORMAL
NITRITE UR QL STRIP: NEGATIVE
PH UR STRIP: <=5 [PH] (ref 5–8)
PROT UR QL STRIP: ABNORMAL
RBC #/AREA URNS HPF: ABNORMAL /HPF
SP GR UR STRIP: 1.02 (ref 1–1.03)
UROBILINOGEN UR STRIP-MCNC: ABNORMAL MG/DL
WBC #/AREA URNS HPF: ABNORMAL /HPF

## 2022-05-16 NOTE — PROGRESS NOTES
Please inform patient her urine culture was no growth. She can stop antibiotic if not finished with it.

## 2022-05-16 NOTE — TELEPHONE ENCOUNTER
Patient called to say that she was able to isolate what was causing her to itch. Patient says she's allergic to mold and recently found mold in her home.    Patient also advised that it was okay to restart Atorvastatin.

## 2022-05-25 ENCOUNTER — OFFICE VISIT (OUTPATIENT)
Dept: NEUROLOGY | Facility: CLINIC | Age: 77
End: 2022-05-25

## 2022-05-25 VITALS
TEMPERATURE: 97.3 F | BODY MASS INDEX: 28.56 KG/M2 | DIASTOLIC BLOOD PRESSURE: 64 MMHG | HEART RATE: 61 BPM | WEIGHT: 161.2 LBS | OXYGEN SATURATION: 96 % | SYSTOLIC BLOOD PRESSURE: 162 MMHG

## 2022-05-25 DIAGNOSIS — I48.20 CHRONIC ATRIAL FIBRILLATION: ICD-10-CM

## 2022-05-25 DIAGNOSIS — Z86.73 HISTORY OF STROKE: Primary | ICD-10-CM

## 2022-05-25 DIAGNOSIS — I10 PRIMARY HYPERTENSION: ICD-10-CM

## 2022-05-25 PROCEDURE — 99214 OFFICE O/P EST MOD 30 MIN: CPT | Performed by: CLINICAL NURSE SPECIALIST

## 2022-05-25 NOTE — PROGRESS NOTES
Follow Up Office Visit      Encounter Date: 2022   Patient Name: Divine Banuelos  : 1945   MRN: [id number]   PCP: Queenie Amezcua APRN    Referring Provider: No ref. provider found     Chief Complaint:    Chief Complaint   Patient presents with   • Follow-up       History of Present Illness:  3/25/2022-   Divine Banuelos is a 76 y.o. female who is here today to establish care.  Patient presents to clinic as a referral from her hospitalization at [facility] and early 2022 where she was noted to have had a tiny area of infarct in her left precentral gyrus.  Apparently she presented to the emergency department with numbness in 2 of her fingers that lasted 1 to 2 minutes, followed by some right hand incoordination the last approximately 1/2-hour and resolved on its own.  Patient had full stroke work-up at the outlying facility, however she does not bring any records or disc with her.  We did receive some records for which I have reviewed.  Prior to admission her known medical diagnoses were depression, hypothyroidism, anxiety and depression.  She was found to be in A. fib while hospitalized and was discharged home on Eliquis 5 mg twice daily, as well as aspirin 81 mg daily.  She continues to take these medications, she denies any overt bleeding.  Her symptoms have resolved and she feels back to baseline.     2022-  Patient presents to clinic in follow-up for previous stroke.  She tells me she feels basically back to her baseline with no new neurologic symptoms.  She does tell me occasionally she will have problems writing with the first 2 fingers on her right hand which were the area of concern with her stroke, however, this only happens occasionally.  She had stopped her atorvastatin and Eliquis for a few days as she was having full body itching and hives.  She was working with cardiologist in trying to determine the source of that, she tells me she has figured out that it was  not medication related that it was related to mold in her house.  She did recently have her lipid panel rechecked.  There was a note from Dr. Cope's office that he was changing her to Xarelto, however that was when they were concerned that Eliquis could be causing her symptoms.  She is now taking Eliquis 5 mg twice daily, cardiology stopped her daily aspirin which I am okay with, and she continues atorvastatin 20 mg nightly for secondary stroke prevention.  Her LDL levels did drop from 155-125 over the last 2 months on 20 mg, patient is reluctant at this time to increase that any further and will continue to work with her primary care on getting her LDL levels lower.  I told her the goal would be less than 70.      Subjective        I have reviewed and the following portions of the patient's history were updated as appropriate: past family history, past medical history, past social history, past surgical history and problem list.    Medications:     Current Outpatient Medications:   •  apixaban (ELIQUIS) 5 MG tablet tablet, Take 1 tablet by mouth Every 12 (Twelve) Hours. Indications: Atrial Fibrillation, Disp: 60 tablet, Rfl: 1  •  atorvastatin (LIPITOR) 20 MG tablet, Take 1 tablet by mouth Daily., Disp: 90 tablet, Rfl: 1  •  Calcium Citrate (CITRACAL PO), Take 1 tablet by mouth Daily. + Magnesium, Disp: , Rfl:   •  estradiol (ESTRACE VAGINAL) 0.1 MG/GM vaginal cream, Massage pea size amount into vaginal opening twice weekly, Disp: 42.5 g, Rfl: 1  •  ipratropium (ATROVENT) 0.06 % nasal spray, 1 spray into the nostril(s) as directed by provider As Needed., Disp: , Rfl:   •  levothyroxine (SYNTHROID, LEVOTHROID) 50 MCG tablet, Take 50 mcg by mouth Daily., Disp: , Rfl:   •  metoprolol tartrate (LOPRESSOR) 25 MG tablet, Take 0.25 tablets by mouth As Needed (Sustained HR > 130 over 1 hour). Patient is to take 6.25 mg every 4-6 hours prn for sustained HR > 130 over 1 hour- Please divide tablet if possible prior to  patient picking up., Disp: 10 tablet, Rfl: 0  •  Misc Natural Products (OSTEO BI-FLEX TRIPLE STRENGTH PO), Take 1 tablet by mouth 2 (Two) Times a Day., Disp: , Rfl:   •  PARoxetine (PAXIL) 10 MG tablet, Take 10 mg by mouth 2 (Two) Times a Day., Disp: , Rfl:   •  ZINC SULFATE PO, Take 1 tablet by mouth Daily., Disp: , Rfl:     Allergies:   No Known Allergies    Objective     Physical Exam:   Vital Signs:   Vitals:    05/25/22 1254   BP: 162/64   Pulse: 61   Temp: 97.3 °F (36.3 °C)   SpO2: 96%   Weight: 73.1 kg (161 lb 3.2 oz)     Body mass index is 28.56 kg/m².    Physical Exam  Vitals reviewed.   Constitutional:       Appearance: Normal appearance.   HENT:      Head: Normocephalic.      Mouth/Throat:      Mouth: Mucous membranes are moist.   Eyes:      Extraocular Movements: Extraocular movements intact.   Cardiovascular:      Rate and Rhythm: Normal rate and regular rhythm.   Pulmonary:      Effort: Pulmonary effort is normal.      Breath sounds: Normal breath sounds.   Skin:     General: Skin is warm and dry.   Neurological:      General: No focal deficit present.      Mental Status: She is alert.      Coordination: Coordination is intact.      Deep Tendon Reflexes: Strength normal.   Psychiatric:         Mood and Affect: Mood normal.         Speech: Speech normal.       Neurological Exam  Mental Status  Alert. Oriented to person, place, time and situation. Speech is normal. Language is fluent with no aphasia. Fund of knowledge is appropriate for level of education.    Cranial Nerves  CN III, IV, VI: Extraocular movements intact bilaterally.  CN V: Facial sensation is normal.  CN VII: Full and symmetric facial movement.  CN VIII: Hearing appears intact.  CN XI: Shoulder shrug strength is normal.  CN XII: Tongue midline without atrophy or fasciculations.    Motor   Strength is 5/5 throughout all four extremities.    Sensory  Light touch is normal in upper and lower extremities.     Coordination    Finger-to-nose,  rapid alternating movements and heel-to-shin normal bilaterally without dysmetria.    Gait  Casual gait is normal including stance, stride, and arm swing.        2022-recent labs reviewed  Total cholesterol 207, , B12 341, AST 22, ALT 16, hemoglobin 13.4, hematocrit 42.7, platelets 314      MODIFIED RUBIN SCALE (to be assessed for each patient having history of stroke) []Stroke history but not assessed  [x]0: No symptoms at all  []1: No significant disability despite symptoms  []2: Slight disability  []3: Moderate disability  []4: Moderately severe disability  []5: Severe disability  []6: Death     NIH Stroke Scale    1a  Level of consciousness: 0=alert; keenly responsive   1b. LOC questions:  0=Answers both questions correctly    1c. LOC commands: 0=Performs both tasks correctly   2.  Best Gaze: 0=normal   3. Visual: 0=No visual loss   4. Facial Palsy: 0=Normal symmetric movement   5a. Motor left arm: 0=No drift, limb holds 90 (or 45) degrees for full 10 seconds   5b.  Motor right arm: 0=No drift, limb holds 90 (or 45) degrees for full 10 seconds   6a. Motor left le=No drift, limb holds 90 (or 45) degrees for full 10 seconds   6b  Motor right le=No drift, limb holds 90 (or 45) degrees for full 10 seconds   7. Limb Ataxia: 0=Absent   8.  Sensory: 0=Normal; no sensory loss   9. Best Language:  0=No aphasia, normal   10. Dysarthria: 0=Normal   11. Extinction and Inattention: 0=No abnormality    Total:   0     PHQ-9 Depression Screening  Little interest or pleasure in doing things? 0-->not at all   Feeling down, depressed, or hopeless? 0-->not at all   Trouble falling or staying asleep, or sleeping too much?     Feeling tired or having little energy?     Poor appetite or overeating?     Feeling bad about yourself - or that you are a failure or have let yourself or your family down?     Trouble concentrating on things, such as reading the newspaper or watching television?     Moving or speaking so  slowly that other people could have noticed? Or the opposite - being so fidgety or restless that you have been moving around a lot more than usual?     Thoughts that you would be better off dead, or of hurting yourself in some way?     PHQ-9 Total Score 0   If you checked off any problems, how difficult have these problems made it for you to do your work, take care of things at home, or get along with other people?         Assessment / Plan      Assessment/Plan:   Diagnoses and all orders for this visit:    1. History of stroke (Primary)  -Aspirin has been stopped by cardiology, I think this is reasonable as she did not have significant stenosis on CTA    2. Primary hypertension  -Encourage patient to monitor blood pressure on a daily basis and keep a log    3. Chronic atrial fibrillation (HCC)  -Patient will continue Eliquis 5 mg twice daily    4.  Hyperlipidemia  -Patient will continue atorvastatin 20 mg nightly for now, she is reluctant to increase this dose  -Her recent lipid panel showed a drop in her LDL of 30 points in the last 2 months  -I educated her that the goal is for her LDL to be less than 70, this can continue to be followed by either neurology or primary care and if her next lipid panel does not show significant drop she may need to increase to 40 mg at least    Reviewed plan as above with patient, all questions answered.  Instructed on signs and symptoms of new infarct and when to call 911.  She verbalized understanding of information given.  We will see her back in about 6 months or sooner if needed.    Follow Up:   Return in about 6 months (around 11/25/2022).    GILBERT Andino  [facility]

## 2022-06-09 ENCOUNTER — OFFICE VISIT (OUTPATIENT)
Dept: ORTHOPEDIC SURGERY | Facility: CLINIC | Age: 77
End: 2022-06-09

## 2022-06-09 VITALS — BODY MASS INDEX: 28.53 KG/M2 | HEIGHT: 63 IN | WEIGHT: 161 LBS | TEMPERATURE: 97.9 F

## 2022-06-09 DIAGNOSIS — M75.52 ACUTE BURSITIS OF LEFT SHOULDER: Primary | ICD-10-CM

## 2022-06-09 PROCEDURE — 20610 DRAIN/INJ JOINT/BURSA W/O US: CPT | Performed by: PHYSICIAN ASSISTANT

## 2022-06-09 RX ORDER — METHYLPREDNISOLONE ACETATE 40 MG/ML
40 INJECTION, SUSPENSION INTRA-ARTICULAR; INTRALESIONAL; INTRAMUSCULAR; SOFT TISSUE
Status: COMPLETED | OUTPATIENT
Start: 2022-06-09 | End: 2022-06-09

## 2022-06-09 RX ORDER — LIDOCAINE HYDROCHLORIDE 20 MG/ML
2 INJECTION, SOLUTION INFILTRATION; PERINEURAL
Status: COMPLETED | OUTPATIENT
Start: 2022-06-09 | End: 2022-06-09

## 2022-06-09 RX ADMIN — LIDOCAINE HYDROCHLORIDE 2 ML: 20 INJECTION, SOLUTION INFILTRATION; PERINEURAL at 15:25

## 2022-06-09 RX ADMIN — METHYLPREDNISOLONE ACETATE 40 MG: 40 INJECTION, SUSPENSION INTRA-ARTICULAR; INTRALESIONAL; INTRAMUSCULAR; SOFT TISSUE at 15:25

## 2022-06-09 NOTE — PROGRESS NOTES
"Subjective   Divine Banuelos is a 76 y.o. female here today for injection therapy.    Chief Complaint   Patient presents with   • Left Shoulder - Follow-up     Here today for cortisone injection.     Patient presents for left shoulder cortisone injection.   Past Medical History:   Diagnosis Date   • Anxiety    • Arthritis    • Bell's palsy    • Bronchitis    • Cataract     bilateral   • Depression    • GERD (gastroesophageal reflux disease)    • History of exercise stress test 2019    patient states it was normal   • Hyperlipidemia    • Hypertension     not currently taking medication for BP   • Hypothyroidism    • Malignant neoplasm of upper-outer quadrant of right breast in female, estrogen receptor positive (HCC) 10/30/2020   • Mitral valve prolapse    • Osteoporosis    • Pessary maintenance    • Wears dentures     upper and lowers         Past Surgical History:   Procedure Laterality Date   • BREAST LUMPECTOMY WITH SENTINEL NODE BIOPSY Right 11/4/2020    Procedure: BREAST LUMPECTOMY WITH SENTINEL NODE BIOPSY RIGHT;  Surgeon: Stuart Caceres MD;  Location: [facility];  Service: General;  Laterality: Right;   • CATARACT EXTRACTION Bilateral    • CATARACT EXTRACTION, BILATERAL     • COLONOSCOPY     • RETINAL DETACHMENT REPAIR Right        No Known Allergies    Objective   Temp 97.9 °F (36.6 °C)   Ht 160 cm (63\")   Wt 73 kg (161 lb)   LMP  (LMP Unknown)   BMI 28.52 kg/m²    Physical Exam    Skin exam stable with no erythema, ecchymosis or rash.  No new swelling.  No motor or sensory changes.  Distal pulse intact.    Large Joint Arthrocentesis: L subacromial bursa  Date/Time: 6/9/2022 3:25 PM  Consent given by: patient  Site marked: site marked  Timeout: Immediately prior to procedure a time out was called to verify the correct patient, procedure, equipment, support staff and site/side marked as required   Supporting Documentation  Indications: pain   Procedure Details  Location: shoulder - L subacromial " bursa  Preparation: Patient was prepped and draped in the usual sterile fashion  Needle size: 22 G  Approach: posterior  Medications administered: 2 mL lidocaine 2%; 40 mg methylPREDNISolone acetate 40 MG/ML  Patient tolerance: patient tolerated the procedure well with no immediate complications          Assessment & Plan      Diagnosis Plan   1. Acute bursitis of left shoulder         Discussion of orthopaedic goals and activities and patient and/or guardian expressed appreciation.  Guided on proper techniques for mobility, strength, agility and/or conditioning exercises  Ice, heat, and/or modalities as beneficial  Watch for signs and symptoms of infection  Call or notify for any adverse effect from injection therapy    Recommendations:  Follow up in 3 months    Patient agreeable to call or return sooner for any concerns.

## 2022-06-14 RX ORDER — APIXABAN 5 MG/1
TABLET, FILM COATED ORAL
Qty: 60 TABLET | Refills: 1 | Status: SHIPPED | OUTPATIENT
Start: 2022-06-14 | End: 2022-08-15

## 2022-07-11 ENCOUNTER — TELEPHONE (OUTPATIENT)
Dept: SURGERY | Facility: CLINIC | Age: 77
End: 2022-07-11

## 2022-07-11 NOTE — TELEPHONE ENCOUNTER
Caller: Divine Banuelos    Relationship to patient: Self    Best call back number: [phone]  Chief complaint: pt has  today    Type of visit: f/u us breast    Requested date: 7/15/22     If rescheduling, when is the original appointment: today      Additional notes:

## 2022-07-13 ENCOUNTER — TELEPHONE (OUTPATIENT)
Dept: OBSTETRICS AND GYNECOLOGY | Facility: CLINIC | Age: 77
End: 2022-07-13

## 2022-07-13 NOTE — TELEPHONE ENCOUNTER
----- Message from Jeanna More MA sent at 7/13/2022 10:59 AM EDT -----  Patient wanted to let Sonali know, she is not using the pessary anymore and is doing much better. Would like to know if she still needed to come in for a visit, since she does not have pessary?  She is not having any problems at this time.    Sonali's Patient     Thank You

## 2022-07-13 NOTE — TELEPHONE ENCOUNTER
If patient feels she is doing well without the pessary and is not having any problems, she would only need to come in should she have any problems or concerns.

## 2022-07-20 ENCOUNTER — OFFICE VISIT (OUTPATIENT)
Dept: ENDOCRINOLOGY | Facility: CLINIC | Age: 77
End: 2022-07-20

## 2022-07-20 VITALS
SYSTOLIC BLOOD PRESSURE: 148 MMHG | WEIGHT: 159 LBS | OXYGEN SATURATION: 98 % | DIASTOLIC BLOOD PRESSURE: 70 MMHG | HEART RATE: 47 BPM | BODY MASS INDEX: 28.17 KG/M2 | HEIGHT: 63 IN

## 2022-07-20 DIAGNOSIS — E04.2 MULTIPLE THYROID NODULES: Primary | ICD-10-CM

## 2022-07-20 DIAGNOSIS — E03.8 HYPOTHYROIDISM DUE TO HASHIMOTO'S THYROIDITIS: ICD-10-CM

## 2022-07-20 DIAGNOSIS — E06.3 HYPOTHYROIDISM DUE TO HASHIMOTO'S THYROIDITIS: ICD-10-CM

## 2022-07-20 PROCEDURE — 99213 OFFICE O/P EST LOW 20 MIN: CPT | Performed by: INTERNAL MEDICINE

## 2022-07-20 PROCEDURE — 76536 US EXAM OF HEAD AND NECK: CPT | Performed by: INTERNAL MEDICINE

## 2022-08-01 ENCOUNTER — HOSPITAL ENCOUNTER (OUTPATIENT)
Facility: HOSPITAL | Age: 77
Discharge: HOME | End: 2022-08-01
Payer: MEDICARE

## 2022-08-01 LAB
A/G RATIO: 1.9 (ref 0.8–2)
ALBUMIN SERPL-MCNC: 4.1 G/DL (ref 3.4–4.8)
ALP BLD-CCNC: 114 U/L (ref 25–100)
ALT SERPL-CCNC: 16 U/L (ref 4–36)
ANION GAP SERPL CALCULATED.3IONS-SCNC: 10 MMOL/L (ref 3–16)
AST SERPL-CCNC: 20 U/L (ref 8–33)
BASOPHILS ABSOLUTE: 0 K/UL (ref 0–0.1)
BASOPHILS RELATIVE PERCENT: 0.7 %
BILIRUB SERPL-MCNC: 0.4 MG/DL (ref 0.3–1.2)
BUN BLDV-MCNC: 11 MG/DL (ref 6–20)
CALCIUM SERPL-MCNC: 9.5 MG/DL (ref 8.5–10.5)
CHLORIDE BLD-SCNC: 106 MMOL/L (ref 98–107)
CHOLESTEROL, TOTAL: 160 MG/DL (ref 0–200)
CO2: 27 MMOL/L (ref 20–30)
CREAT SERPL-MCNC: 0.9 MG/DL (ref 0.4–1.2)
EOSINOPHILS ABSOLUTE: 0.2 K/UL (ref 0–0.4)
EOSINOPHILS RELATIVE PERCENT: 3.9 %
FOLATE: 16.87 NG/ML
GFR AFRICAN AMERICAN: >59
GFR NON-AFRICAN AMERICAN: >60
GLOBULIN: 2.2 G/DL
GLUCOSE BLD-MCNC: 102 MG/DL (ref 74–106)
HCT VFR BLD CALC: 41 % (ref 37–47)
HDLC SERPL-MCNC: 65 MG/DL (ref 40–60)
HEMOGLOBIN: 13.1 G/DL (ref 11.5–16.5)
IMMATURE GRANULOCYTES #: 0 K/UL
IMMATURE GRANULOCYTES %: 0.3 % (ref 0–5)
LDL CHOLESTEROL CALCULATED: 80 MG/DL
LYMPHOCYTES ABSOLUTE: 1.6 K/UL (ref 1.5–4)
LYMPHOCYTES RELATIVE PERCENT: 27.1 %
MCH RBC QN AUTO: 28.5 PG (ref 27–32)
MCHC RBC AUTO-ENTMCNC: 32 G/DL (ref 31–35)
MCV RBC AUTO: 89.1 FL (ref 80–100)
MONOCYTES ABSOLUTE: 0.6 K/UL (ref 0.2–0.8)
MONOCYTES RELATIVE PERCENT: 9.4 %
NEUTROPHILS ABSOLUTE: 3.5 K/UL (ref 2–7.5)
NEUTROPHILS RELATIVE PERCENT: 58.6 %
PDW BLD-RTO: 13.2 % (ref 11–16)
PLATELET # BLD: 284 K/UL (ref 150–400)
PMV BLD AUTO: 10 FL (ref 6–10)
POTASSIUM SERPL-SCNC: 4.4 MMOL/L (ref 3.4–5.1)
RBC # BLD: 4.6 M/UL (ref 3.8–5.8)
SARS-COV-2 ANTIBODY, TOTAL: POSITIVE
SODIUM BLD-SCNC: 143 MMOL/L (ref 136–145)
TOTAL PROTEIN: 6.3 G/DL (ref 6.4–8.3)
TRIGL SERPL-MCNC: 76 MG/DL (ref 0–249)
TSH SERPL DL<=0.05 MIU/L-ACNC: 3.61 UIU/ML (ref 0.27–4.2)
VITAMIN B-12: 422 PG/ML (ref 211–911)
VITAMIN D 25-HYDROXY: 44.6 (ref 32–100)
VLDLC SERPL CALC-MCNC: 15 MG/DL
WBC # BLD: 6 K/UL (ref 4–11)

## 2022-08-01 PROCEDURE — 86769 SARS-COV-2 COVID-19 ANTIBODY: CPT

## 2022-08-01 PROCEDURE — 84443 ASSAY THYROID STIM HORMONE: CPT

## 2022-08-01 PROCEDURE — 82306 VITAMIN D 25 HYDROXY: CPT

## 2022-08-01 PROCEDURE — 80061 LIPID PANEL: CPT

## 2022-08-01 PROCEDURE — 85025 COMPLETE CBC W/AUTO DIFF WBC: CPT

## 2022-08-01 PROCEDURE — 80053 COMPREHEN METABOLIC PANEL: CPT

## 2022-08-01 PROCEDURE — 82607 VITAMIN B-12: CPT

## 2022-08-01 PROCEDURE — 82746 ASSAY OF FOLIC ACID SERUM: CPT

## 2022-08-03 NOTE — PROGRESS NOTES
Patient: Divine Banuelos  YOB: 1945    Date: 08/08/2022    Primary Care Provider: Queenie Amezcua APRN    Chief Complaint   Patient presents with   • Follow-up     1 year right breast lumpectomy        History: Patient was seen today in follow up for her breast.  Patient is s/p right breast lumpectomy due to mammary carcinoma with papillary features.  Patient states she is doing well and has no complaints.  Ultrasound today indicates a mass behind the right nipple, this is totally away from the previous lumpectomy in the upper outer quadrant of the right breast.  Patient not have postoperative radiation or chemotherapy.    The following portions of the patient's history were reviewed and updated as appropriate: allergies, current medications, past family history, past medical history, past social history, past surgical history and problem list.    Review of Systems   Constitutional: Negative for chills, fever and unexpected weight change.   HENT: Negative for hearing loss, trouble swallowing and voice change.    Eyes: Negative for visual disturbance.   Respiratory: Negative for apnea, cough, chest tightness, shortness of breath and wheezing.    Cardiovascular: Negative for chest pain, palpitations and leg swelling.   Gastrointestinal: Negative for abdominal distention, abdominal pain, anal bleeding, blood in stool, constipation, diarrhea, nausea, rectal pain and vomiting.   Endocrine: Negative for cold intolerance and heat intolerance.   Genitourinary: Negative for difficulty urinating, dysuria and flank pain.   Musculoskeletal: Negative for back pain and gait problem.   Skin: Negative for color change, rash and wound.   Neurological: Negative for dizziness, syncope, speech difficulty, weakness, light-headedness, numbness and headaches.   Hematological: Negative for adenopathy. Does not bruise/bleed easily.   Psychiatric/Behavioral: Negative for confusion. The patient is not nervous/anxious.   "      Vital Signs  Vitals:    08/08/22 1434   BP: 128/64   Pulse: 60   Resp: 16   Temp: 97.5 °F (36.4 °C)   TempSrc: Temporal   SpO2: 96%   Weight: 73.4 kg (161 lb 12.8 oz)   Height: 160 cm (63\")       Allergies:  No Known Allergies    Medications:    Current Outpatient Medications:   •  atorvastatin (LIPITOR) 20 MG tablet, Take 1 tablet by mouth Daily., Disp: 90 tablet, Rfl: 1  •  Calcium Citrate (CITRACAL PO), Take 1 tablet by mouth Daily. + Magnesium, Disp: , Rfl:   •  Eliquis 5 MG tablet tablet, TAKE 1 TABLET BY MOUTH EVERY 12 HOURS FOR ATRIAL FIBRILLATION, Disp: 60 tablet, Rfl: 1  •  estradiol (ESTRACE VAGINAL) 0.1 MG/GM vaginal cream, Massage pea size amount into vaginal opening twice weekly, Disp: 42.5 g, Rfl: 1  •  ipratropium (ATROVENT) 0.06 % nasal spray, 1 spray into the nostril(s) as directed by provider As Needed., Disp: , Rfl:   •  levothyroxine (SYNTHROID, LEVOTHROID) 50 MCG tablet, Take 50 mcg by mouth Daily., Disp: , Rfl:   •  Misc Natural Products (OSTEO BI-FLEX TRIPLE STRENGTH PO), Take 1 tablet by mouth 2 (Two) Times a Day., Disp: , Rfl:   •  PARoxetine (PAXIL) 10 MG tablet, Take 10 mg by mouth 2 (Two) Times a Day., Disp: , Rfl:   •  ZINC SULFATE PO, Take 1 tablet by mouth Daily., Disp: , Rfl:   •  metoprolol tartrate (LOPRESSOR) 25 MG tablet, Take 0.25 tablets by mouth As Needed (Sustained HR > 130 over 1 hour). Patient is to take 6.25 mg every 4-6 hours prn for sustained HR > 130 over 1 hour- Please divide tablet if possible prior to patient picking up., Disp: 10 tablet, Rfl: 0    Physical Exam:   General Appearance:    Alert, cooperative, in no acute distress   Head:    Normocephalic, without obvious abnormality, atraumatic   Lungs:     Clear to auscultation,respirations regular, even and                  unlabored    Heart:    Regular rhythm and normal rate, normal S1 and S2, no            murmur, no gallop, no rub, no click  Breast-unable to palpate a right breast mass.   Abdomen:     Normal " bowel sounds, no masses, no organomegaly, soft        non-tender, non-distended, no guarding, no rebound                tenderness   Extremities:   Moves all extremities well, no edema, no cyanosis, no             redness   Pulses:   Pulses palpable and equal bilaterally   Skin:   No bleeding, bruising or rash     Results Review:   I reviewed the patient's new clinical results.     Review of Systems was reviewed and confirmed as accurate as documented by the MA.    ASSESSMENT/PLAN:    1. History of breast cancer    2. Subareolar mass of right breast       Patient scheduled for right breast surgical biopsy.  The area is in the area behind the nipple as well as o'clock position.  Possible bleeding infection discussed and patient agreeable.  60    Electronically signed by Stuart Caceres MD  08/08/22

## 2022-08-08 ENCOUNTER — OFFICE VISIT (OUTPATIENT)
Dept: SURGERY | Facility: CLINIC | Age: 77
End: 2022-08-08

## 2022-08-08 VITALS
RESPIRATION RATE: 16 BRPM | SYSTOLIC BLOOD PRESSURE: 128 MMHG | DIASTOLIC BLOOD PRESSURE: 64 MMHG | TEMPERATURE: 97.5 F | HEART RATE: 60 BPM | OXYGEN SATURATION: 96 % | HEIGHT: 63 IN | BODY MASS INDEX: 28.67 KG/M2 | WEIGHT: 161.8 LBS

## 2022-08-08 DIAGNOSIS — N63.41 SUBAREOLAR MASS OF RIGHT BREAST: ICD-10-CM

## 2022-08-08 DIAGNOSIS — Z85.3 HISTORY OF BREAST CANCER: Primary | ICD-10-CM

## 2022-08-08 PROCEDURE — 99213 OFFICE O/P EST LOW 20 MIN: CPT | Performed by: SURGERY

## 2022-08-09 PROBLEM — N63.41 SUBAREOLAR MASS OF RIGHT BREAST: Status: ACTIVE | Noted: 2022-08-09

## 2022-08-12 ENCOUNTER — TELEPHONE (OUTPATIENT)
Dept: SURGERY | Facility: CLINIC | Age: 77
End: 2022-08-12

## 2022-08-15 RX ORDER — APIXABAN 5 MG/1
TABLET, FILM COATED ORAL
Qty: 60 TABLET | Refills: 1 | Status: SHIPPED | OUTPATIENT
Start: 2022-08-15 | End: 2022-10-10

## 2022-08-16 ENCOUNTER — TELEPHONE (OUTPATIENT)
Dept: SURGERY | Facility: CLINIC | Age: 77
End: 2022-08-16

## 2022-08-16 NOTE — PAT
"1120-spoke to B Fabiano CRNA regarding pt report of \"small stroke in March\". Inpt notes from Amy and cardiology notes in Epic. No additional information needed for scheduled procedure per CRNA.  "

## 2022-08-16 NOTE — PAT
1039-During PAT call, pt read back Dr Caceres's paperwork regarding stopping blood thinners for 3 days prior. Pt reports she did not stop them. Patient instructed to phone Dr Caceres's office to update.

## 2022-08-16 NOTE — TELEPHONE ENCOUNTER
PT IS CALLING IN REGARDS TO TAKING ELIQUIS LAST NIGHT 08/15/2022 SURGERY IS 08/17/2022. PLEASE CALL PT BACK [phone]

## 2022-08-17 ENCOUNTER — HOSPITAL ENCOUNTER (OUTPATIENT)
Facility: HOSPITAL | Age: 77
Setting detail: HOSPITAL OUTPATIENT SURGERY
Discharge: HOME | End: 2022-08-17
Attending: SURGERY | Admitting: SURGERY

## 2022-08-17 ENCOUNTER — ANESTHESIA EVENT (OUTPATIENT)
Dept: PERIOP | Facility: HOSPITAL | Age: 77
End: 2022-08-17

## 2022-08-17 ENCOUNTER — ANESTHESIA (OUTPATIENT)
Dept: PERIOP | Facility: HOSPITAL | Age: 77
End: 2022-08-17

## 2022-08-17 VITALS
HEIGHT: 63 IN | TEMPERATURE: 97 F | SYSTOLIC BLOOD PRESSURE: 172 MMHG | DIASTOLIC BLOOD PRESSURE: 65 MMHG | HEART RATE: 50 BPM | OXYGEN SATURATION: 98 % | RESPIRATION RATE: 16 BRPM | BODY MASS INDEX: 28.35 KG/M2 | WEIGHT: 160 LBS

## 2022-08-17 DIAGNOSIS — N63.41 SUBAREOLAR MASS OF RIGHT BREAST: ICD-10-CM

## 2022-08-17 DIAGNOSIS — Z85.3 HISTORY OF BREAST CANCER: ICD-10-CM

## 2022-08-17 PROCEDURE — 0 CEFAZOLIN SODIUM-DEXTROSE 2-3 GM-%(50ML) RECONSTITUTED SOLUTION: Performed by: SURGERY

## 2022-08-17 PROCEDURE — 88305 TISSUE EXAM BY PATHOLOGIST: CPT

## 2022-08-17 PROCEDURE — 19120 REMOVAL OF BREAST LESION: CPT | Performed by: SURGERY

## 2022-08-17 PROCEDURE — 0 LIDOCAINE 1 % SOLUTION: Performed by: SURGERY

## 2022-08-17 PROCEDURE — 25010000002 PROPOFOL 10 MG/ML EMULSION: Performed by: NURSE ANESTHETIST, CERTIFIED REGISTERED

## 2022-08-17 RX ORDER — PROPOFOL 10 MG/ML
VIAL (ML) INTRAVENOUS AS NEEDED
Status: DISCONTINUED | OUTPATIENT
Start: 2022-08-17 | End: 2022-08-17 | Stop reason: SURG

## 2022-08-17 RX ORDER — KETAMINE HYDROCHLORIDE 50 MG/ML
INJECTION, SOLUTION, CONCENTRATE INTRAMUSCULAR; INTRAVENOUS AS NEEDED
Status: DISCONTINUED | OUTPATIENT
Start: 2022-08-17 | End: 2022-08-17 | Stop reason: SURG

## 2022-08-17 RX ORDER — SODIUM CHLORIDE 0.9 % (FLUSH) 0.9 %
10 SYRINGE (ML) INJECTION AS NEEDED
Status: DISCONTINUED | OUTPATIENT
Start: 2022-08-17 | End: 2022-08-17 | Stop reason: HOSPADM

## 2022-08-17 RX ORDER — LIDOCAINE HYDROCHLORIDE 10 MG/ML
INJECTION, SOLUTION INFILTRATION; PERINEURAL AS NEEDED
Status: DISCONTINUED | OUTPATIENT
Start: 2022-08-17 | End: 2022-08-17 | Stop reason: HOSPADM

## 2022-08-17 RX ORDER — MAGNESIUM HYDROXIDE 1200 MG/15ML
LIQUID ORAL AS NEEDED
Status: DISCONTINUED | OUTPATIENT
Start: 2022-08-17 | End: 2022-08-17 | Stop reason: HOSPADM

## 2022-08-17 RX ORDER — HYDROCODONE BITARTRATE AND ACETAMINOPHEN 5; 325 MG/1; MG/1
1-2 TABLET ORAL EVERY 4 HOURS PRN
Qty: 12 TABLET | Refills: 0 | Status: SHIPPED | OUTPATIENT
Start: 2022-08-17 | End: 2022-10-27

## 2022-08-17 RX ORDER — CEFAZOLIN SODIUM 2 G/50ML
2 SOLUTION INTRAVENOUS ONCE
Status: COMPLETED | OUTPATIENT
Start: 2022-08-17 | End: 2022-08-17

## 2022-08-17 RX ORDER — SODIUM CHLORIDE, SODIUM LACTATE, POTASSIUM CHLORIDE, CALCIUM CHLORIDE 600; 310; 30; 20 MG/100ML; MG/100ML; MG/100ML; MG/100ML
1000 INJECTION, SOLUTION INTRAVENOUS CONTINUOUS
Status: DISCONTINUED | OUTPATIENT
Start: 2022-08-17 | End: 2022-08-17 | Stop reason: HOSPADM

## 2022-08-17 RX ORDER — LIDOCAINE HYDROCHLORIDE 20 MG/ML
INJECTION, SOLUTION INTRAVENOUS AS NEEDED
Status: DISCONTINUED | OUTPATIENT
Start: 2022-08-17 | End: 2022-08-17 | Stop reason: SURG

## 2022-08-17 RX ADMIN — SODIUM CHLORIDE, POTASSIUM CHLORIDE, SODIUM LACTATE AND CALCIUM CHLORIDE 1000 ML: 600; 310; 30; 20 INJECTION, SOLUTION INTRAVENOUS at 12:41

## 2022-08-17 RX ADMIN — KETAMINE HYDROCHLORIDE 20 MG: 50 INJECTION, SOLUTION INTRAMUSCULAR; INTRAVENOUS at 14:00

## 2022-08-17 RX ADMIN — PROPOFOL 120 MCG/KG/MIN: 10 INJECTION, EMULSION INTRAVENOUS at 14:00

## 2022-08-17 RX ADMIN — PROPOFOL 50 MG: 10 INJECTION, EMULSION INTRAVENOUS at 14:00

## 2022-08-17 RX ADMIN — LIDOCAINE HYDROCHLORIDE 40 MG: 20 INJECTION, SOLUTION INTRAVENOUS at 14:00

## 2022-08-17 RX ADMIN — CEFAZOLIN SODIUM 2 G: 2 SOLUTION INTRAVENOUS at 13:52

## 2022-08-17 NOTE — DISCHARGE INSTRUCTIONS
Please follow all post op instructions and follow up appointment time from your physician's office included in your discharge packet.    REST TODAY    No pushing, pulling, tugging,  heavy lifting, or strenuous activity.  No major decision making, driving, or drinking alcoholic beverages for 24 hours. ( due to the medications you have  received)  Always use good hand hygiene/washing techniques.  NO driving while taking pain medications.    * if you have an incision:  Check your incision area every day for signs of infection.   Check for:  * more redness, swelling, or pain  *more fluid or blood  *warmth  *pus or bad smell     To assist you in voiding:  Drink plenty of fluids  Listen to running water while attempting to void.    If you are unable to urinate and you have an uncomfortable urge to void or it has been   6 hours since you were discharged, return to the Emergency Room

## 2022-08-17 NOTE — OP NOTE
PATIENT:    Divine Banuelos    DATE OF SURGERY:  8/17/2022    PHYSICIAN:    Stuart Caceres MD    REFERRING PHYSICIAN:  Stuart Caceres MD    YOB: 1945    PREOPERATIVE DIAGNOSIS: Right breast mass    POSTOPERATIVE DIAGNOSIS: Same    PROCEDURE: Right breast surgical biopsy    Specimen: Right breast mass    EBL: 10 cc    Complications: None    INDICATIONS:  The patient was sent to me as a consultation by Stuart Caceres MD for evaluation and treatment of a history significant for right breast mass, suspicious on ultrasound. They are here now today for right breast excisional biopsy    ANESTHESIA: Sedation with local anesthesia     OPERATIVE PROCEDURE:  The patient was taken to the operating room, placed in the supine position, and given sedation with local anesthesia.  They were prepped and draped in the normal sterile fashion.  They did receive preoperative IV antibiotics.  The nursing staff did perform intraoperative timeout prior to the incision.    Right breast prepped normal fashion, lidocaine injected elliptical incision made around the areola on the right breast.  I excised the tissue and the mass feels behind the nipple in the right and the completely.  I sent to pathology and labeled a right breast lump.  I then called the bleeding site closed wound in 2 layers and placed Steri-Strips and a dressing.  She tolerated procedure with no complications.  All counts were correct.    The patient was stable at this point in time and subsequently transferred back to the recovery room in stable condition.       Stuart Caceres MD  8/17/2022  14:22 EDT

## 2022-08-17 NOTE — ANESTHESIA PREPROCEDURE EVALUATION
Anesthesia Evaluation     Patient summary reviewed and Nursing notes reviewed   no history of anesthetic complications:  NPO Solid Status: > 8 hours  NPO Liquid Status: > 8 hours           Airway   Mallampati: I  TM distance: >3 FB  Neck ROM: full  no difficulty expected  Dental - normal exam     Pulmonary - normal exam   (+) shortness of breath,   Cardiovascular - normal exam  Exercise tolerance: good (4-7 METS)    PT is on anticoagulation therapy  Patient on routine beta blocker  Rhythm: regular  Rate: normal    (+) hypertension, valvular problems/murmurs MVP, dysrhythmias Atrial Fib, hyperlipidemia,     ROS comment: EKG 3/21/22- Sinus bradycardia with 1st degree AV block    Neuro/Psych  (+) CVA, headaches, numbness, psychiatric history Anxiety and Depression,    GI/Hepatic/Renal/Endo    (+)  GERD,  thyroid problem hypothyroidism    Musculoskeletal     (+) neck pain,   Abdominal  - normal exam    Abdomen: soft.  Bowel sounds: normal.   Substance History      OB/GYN          Other   arthritis,    history of cancer active                  Anesthesia Plan    ASA 3     MAC     (Risks and benefits discussed including risk of aspiration, recall and dental damage. All patient questions answered. Will continue with POC.)  intravenous induction     Anesthetic plan, risks, benefits, and alternatives have been provided, discussed and informed consent has been obtained with: patient.  Pre-procedure education provided      CODE STATUS:

## 2022-08-17 NOTE — ANESTHESIA POSTPROCEDURE EVALUATION
Patient: Divine Banuelos    Procedure Summary     Date: 08/17/22 Room / Location: [facility] OR  /  [facility]    Anesthesia Start: 1352 Anesthesia Stop:     Procedure: BREAST BIOPSY (Right Breast) Diagnosis:       History of breast cancer      Subareolar mass of right breast      (History of breast cancer [Z85.3])      (Subareolar mass of right breast [N63.41])    Surgeons: Stuart Caceres MD Provider: Eyad Govea CRNA    Anesthesia Type: MAC ASA Status: 3          Anesthesia Type: MAC    Vitals  HR 43  Sat 99  BP 92/46  Resp 12  Temp 98        Post Anesthesia Care and Evaluation    Patient location during evaluation: bedside  Patient participation: complete - patient participated  Level of consciousness: awake and alert and sleepy but conscious  Pain score: 0  Pain management: adequate    Airway patency: patent  Anesthetic complications: No anesthetic complications  PONV Status: none  Cardiovascular status: acceptable  Respiratory status: acceptable  Hydration status: acceptable

## 2022-08-19 LAB — REF LAB TEST METHOD: NORMAL

## 2022-08-22 NOTE — PROGRESS NOTES
Thyroid Ultrasound Report  [facility]  [place], [place]    Date: 07/20/2022  Indication: thyroid nodules  Comparison Imaging: Endocrine clinic thyroid ultrasound (07/2021)  Clinical History: 75 y.o. female with h/o hypothyroidism since 2014. Outside US report describing thyroid nodules. Pt with a small, firm palpable mass in the left upper neck.    Real time high resolution imaging of the thyroid gland was performed in transverse and longitudinal planes.  The right lobe measured 3.85 cm in Length x 1.06 cm in AP diameter x 1.54 cm in TV dimension.  The isthmus measured 0.25 cm in thickness.  The left thyroid lobe measured 3.73 cm in length x 1.23 cm in AP diameter x 1.32 cm in TV dimension.    The thyroid gland appeared overall isoechoic with heterogeneous echotexture.    In the right superior lobe, a 0.72(L) x 0.47(AP) x 0.57(T) cm slightly hypoechoic, solid nodule was again identified. The nodule had a smooth margin, no vascularity, and no microcalcifications.     In the right mid lobe, a 0.93(L) x 0.50(AP) x 0.53(T) cm isoechoic, solid nodule was again identified. The nodule had a smooth margin, peripheral and intranodal vascularity, and no microcalcifications.     In the right inferior lobe, a 0.91(L) x 0.76(AP) x 0.83(T) cm slightly hypoechoic, solid nodule was again identified. The nodule had a smooth margin, no vascularity, and no microcalcifications.     In the left mid lobe, a 1.05(L) x 0.62(AP) x 0.76(T) cm slightly hypoechoic, solid nodule was again identified. The nodule had a smooth margin, no vascularity, and no microcalcifications.     There were no lesions or abnormal lymph nodes in the left upper neck in the area of the palpable mass on exam. A small blood vessel with a calcified wall was identified in the area of the palpable mass.    No pathologic lymph nodes were seen.     IMPRESSION:   1) The thyroid gland was normal in size by measured dimensions.   2) In the right superior lobe,  a 0.72(L) x 0.47(AP) x 0.57(T) cm slightly hypoechoic, solid nodule was again identified. The nodule had a smooth margin, no vascularity, and no microcalcifications.   3) In the right mid lobe, a 0.93(L) x 0.50(AP) x 0.53(T) cm isoechoic, solid nodule was again identified. The nodule had a smooth margin, peripheral and intranodal vascularity, and no microcalcifications.   4) In the right inferior lobe, a 0.91(L) x 0.76(AP) x 0.83(T) cm slightly hypoechoic, solid nodule was again identified. The nodule had a smooth margin, no vascularity, and no microcalcifications.   5) In the left mid lobe, a 1.05(L) x 0.62(AP) x 0.76(T) cm slightly hypoechoic, solid nodule was again identified. The nodule had a smooth margin, no vascularity, and no microcalcifications.   6) The nodules identified lacked suspicious US characteristics and did not meet criteria for FNA.  7) A small blood vessel with a calcified wall was identified in the left upper neck in the area of the small palpable mass on physical exam.    RECOMMENDATIONS: Repeat thyroid US recommended to the patient in one year.    Signed: Diandra Elliott MD

## 2022-08-22 NOTE — PROGRESS NOTES
"Patient: Divine Banuelos    YOB: 1945    Date: 08/24/2022    Primary Care Provider: Queenie Amezcua APRN    Chief Complaint   Patient presents with   • Post-op Follow-up     lumpectomy       History of present illness:  I saw the patient in the office today as a followup from their recent right lumpectomy.  They state that they have done well and are having no complaints. Pathology report was reviewed and did show dense stromal fibrosis.    Vital Signs:   Vitals:    08/24/22 1044   BP: 146/90   Pulse: 64   Temp: 98 °F (36.7 °C)   TempSrc: Temporal   SpO2: 97%   Weight: 72.6 kg (160 lb 0.9 oz)   Height: 160 cm (62.99\")       Physical Exam:   General Appearance:    Alert, cooperative, in no acute distress   Breast:  Breast incision healed nicely, axillary incision healing, no signs of infection   Abdomen:     no masses, no organomegaly, soft non-tender, non-distended, no guarding, wounds are well healed     Results Review:   I reviewed the patient's new clinical results.    Assessment / Plan:    1. Postoperative visit        I did discuss the situation with the patient today in the office and they have done well from their recent lumpectomy.  I did have a detailed and extensive discussion with the patient about the pathology report, I want the patient to schedule mammogram in the next 2 to 3 weeks.  Last mammogram over 2 years ago.  Follow-up in 1 year for repeat ultrasound.    Electronically signed by Stuart Caceres MD  [unfilled]                    "

## 2022-08-24 ENCOUNTER — OFFICE VISIT (OUTPATIENT)
Dept: SURGERY | Facility: CLINIC | Age: 77
End: 2022-08-24

## 2022-08-24 VITALS
TEMPERATURE: 98 F | HEART RATE: 64 BPM | HEIGHT: 63 IN | OXYGEN SATURATION: 97 % | SYSTOLIC BLOOD PRESSURE: 146 MMHG | BODY MASS INDEX: 28.36 KG/M2 | WEIGHT: 160.05 LBS | DIASTOLIC BLOOD PRESSURE: 90 MMHG

## 2022-08-24 DIAGNOSIS — Z48.89 POSTOPERATIVE VISIT: Primary | ICD-10-CM

## 2022-08-24 DIAGNOSIS — N63.41 SUBAREOLAR MASS OF RIGHT BREAST: Primary | ICD-10-CM

## 2022-08-24 PROCEDURE — 99024 POSTOP FOLLOW-UP VISIT: CPT | Performed by: SURGERY

## 2022-10-05 ENCOUNTER — HOSPITAL ENCOUNTER (OUTPATIENT)
Facility: HOSPITAL | Age: 77
Discharge: HOME | End: 2022-10-05
Payer: MEDICARE

## 2022-10-05 LAB
A/G RATIO: 1.6 (ref 0.8–2)
ALBUMIN SERPL-MCNC: 4.1 G/DL (ref 3.4–4.8)
ALP BLD-CCNC: 119 U/L (ref 25–100)
ALT SERPL-CCNC: 21 U/L (ref 4–36)
ANION GAP SERPL CALCULATED.3IONS-SCNC: 10 MMOL/L (ref 3–16)
AST SERPL-CCNC: 25 U/L (ref 8–33)
BASOPHILS ABSOLUTE: 0 K/UL (ref 0–0.1)
BASOPHILS RELATIVE PERCENT: 0.4 %
BILIRUB SERPL-MCNC: 0.4 MG/DL (ref 0.3–1.2)
BUN BLDV-MCNC: 12 MG/DL (ref 6–20)
CALCIUM SERPL-MCNC: 9.5 MG/DL (ref 8.5–10.5)
CHLORIDE BLD-SCNC: 104 MMOL/L (ref 98–107)
CHOLESTEROL, TOTAL: 155 MG/DL (ref 0–200)
CO2: 27 MMOL/L (ref 20–30)
CREAT SERPL-MCNC: 0.8 MG/DL (ref 0.4–1.2)
EOSINOPHILS ABSOLUTE: 0.2 K/UL (ref 0–0.4)
EOSINOPHILS RELATIVE PERCENT: 3.3 %
FOLATE: >20 NG/ML
GFR AFRICAN AMERICAN: >59
GFR NON-AFRICAN AMERICAN: >60
GLOBULIN: 2.5 G/DL
GLUCOSE BLD-MCNC: 103 MG/DL (ref 74–106)
HCT VFR BLD CALC: 40.2 % (ref 37–47)
HDLC SERPL-MCNC: 64 MG/DL (ref 40–60)
HEMOGLOBIN: 12.7 G/DL (ref 11.5–16.5)
IMMATURE GRANULOCYTES #: 0 K/UL
IMMATURE GRANULOCYTES %: 0.3 % (ref 0–5)
LDL CHOLESTEROL CALCULATED: 74 MG/DL
LYMPHOCYTES ABSOLUTE: 1.6 K/UL (ref 1.5–4)
LYMPHOCYTES RELATIVE PERCENT: 23.2 %
MCH RBC QN AUTO: 29 PG (ref 27–32)
MCHC RBC AUTO-ENTMCNC: 31.6 G/DL (ref 31–35)
MCV RBC AUTO: 91.8 FL (ref 80–100)
MONOCYTES ABSOLUTE: 0.6 K/UL (ref 0.2–0.8)
MONOCYTES RELATIVE PERCENT: 8.6 %
NEUTROPHILS ABSOLUTE: 4.5 K/UL (ref 2–7.5)
NEUTROPHILS RELATIVE PERCENT: 64.2 %
PDW BLD-RTO: 13.2 % (ref 11–16)
PLATELET # BLD: 278 K/UL (ref 150–400)
PMV BLD AUTO: 10.1 FL (ref 6–10)
POTASSIUM SERPL-SCNC: 4.8 MMOL/L (ref 3.4–5.1)
RBC # BLD: 4.38 M/UL (ref 3.8–5.8)
SODIUM BLD-SCNC: 141 MMOL/L (ref 136–145)
TOTAL PROTEIN: 6.6 G/DL (ref 6.4–8.3)
TRIGL SERPL-MCNC: 84 MG/DL (ref 0–249)
TSH SERPL DL<=0.05 MIU/L-ACNC: 3.45 UIU/ML (ref 0.27–4.2)
VITAMIN B-12: 423 PG/ML (ref 211–911)
VITAMIN D 25-HYDROXY: 43.7 (ref 32–100)
VLDLC SERPL CALC-MCNC: 17 MG/DL
WBC # BLD: 7 K/UL (ref 4–11)

## 2022-10-05 PROCEDURE — 80053 COMPREHEN METABOLIC PANEL: CPT

## 2022-10-05 PROCEDURE — 85025 COMPLETE CBC W/AUTO DIFF WBC: CPT

## 2022-10-05 PROCEDURE — 82306 VITAMIN D 25 HYDROXY: CPT

## 2022-10-05 PROCEDURE — 82746 ASSAY OF FOLIC ACID SERUM: CPT

## 2022-10-05 PROCEDURE — 84443 ASSAY THYROID STIM HORMONE: CPT

## 2022-10-05 PROCEDURE — 80061 LIPID PANEL: CPT

## 2022-10-05 PROCEDURE — 36415 COLL VENOUS BLD VENIPUNCTURE: CPT

## 2022-10-05 PROCEDURE — 82607 VITAMIN B-12: CPT

## 2022-10-10 RX ORDER — APIXABAN 5 MG/1
TABLET, FILM COATED ORAL
Qty: 60 TABLET | Refills: 1 | Status: SHIPPED | OUTPATIENT
Start: 2022-10-10 | End: 2022-12-12

## 2022-10-12 ENCOUNTER — HOSPITAL ENCOUNTER (OUTPATIENT)
Dept: MAMMOGRAPHY | Facility: HOSPITAL | Age: 77
Discharge: HOME | End: 2022-10-12
Admitting: SURGERY

## 2022-10-12 DIAGNOSIS — N63.41 SUBAREOLAR MASS OF RIGHT BREAST: ICD-10-CM

## 2022-10-12 PROCEDURE — G0279 TOMOSYNTHESIS, MAMMO: HCPCS

## 2022-10-12 PROCEDURE — 77066 DX MAMMO INCL CAD BI: CPT

## 2022-10-14 RX ORDER — ATORVASTATIN CALCIUM 20 MG/1
20 TABLET, FILM COATED ORAL DAILY
Qty: 90 TABLET | Refills: 0 | Status: SHIPPED | OUTPATIENT
Start: 2022-10-14 | End: 2023-01-09

## 2022-10-27 ENCOUNTER — OFFICE VISIT (OUTPATIENT)
Dept: CARDIOLOGY | Facility: CLINIC | Age: 77
End: 2022-10-27

## 2022-10-27 VITALS
BODY MASS INDEX: 29.41 KG/M2 | HEART RATE: 58 BPM | HEIGHT: 63 IN | DIASTOLIC BLOOD PRESSURE: 60 MMHG | SYSTOLIC BLOOD PRESSURE: 144 MMHG | OXYGEN SATURATION: 98 % | WEIGHT: 166 LBS

## 2022-10-27 DIAGNOSIS — I10 PRIMARY HYPERTENSION: ICD-10-CM

## 2022-10-27 DIAGNOSIS — I48.0 PAROXYSMAL ATRIAL FIBRILLATION: ICD-10-CM

## 2022-10-27 DIAGNOSIS — I50.32 DIASTOLIC DYSFUNCTION WITH CHRONIC HEART FAILURE: Primary | ICD-10-CM

## 2022-10-27 PROCEDURE — 99214 OFFICE O/P EST MOD 30 MIN: CPT | Performed by: INTERNAL MEDICINE

## 2022-10-27 RX ORDER — ACETAMINOPHEN 500 MG
250 TABLET ORAL EVERY 6 HOURS PRN
COMMUNITY

## 2022-10-27 NOTE — PROGRESS NOTES
[facility]  Office visit  Divine Banuelos  1945  [phone]    VISIT DATE:  10/27/2022      PCP: Queenie Amezcua, APRN  [address] [place] [id number]    CC:  Chief Complaint   Patient presents with   • New onset atrial fibrillation    • Diastolic dysfunction with chronic heart failure        PROBLEM LIST:  1. Hypertension:  a.  History of elevated blood pressure due to stress.  b.  Discontinuation of enalapril secondary to hypotension, June 2013.  2. Bradycardia-Holter October 2017: Average heart rate 52 bpm, range of 36-94 bpm. Asymptomatic  3. Hypothyroidism.  4. Depression.  5. Anxiety.  6. Osteoporosis, mild.  7. Mild dyslipidemia.  8. History of Bell’s palsy.  9. Surgical history:  a. Bilateral cataract extraction.    Cardiac studies and procedures:  September 2019  Echo  · Estimated EF appears to be in the range of 56 - 60%.  · Left ventricular diastolic dysfunction (grade II) consistent with pseudonormalization.  · Left atrial cavity size is borderline dilated.  · Mild mitral valve regurgitation is present  Exercise myocardial perfusion imaging  · A stress test was performed following the Fredis protocol.  · The patient reported shortness of breath during the stress test.  · Blood pressure demonstrated a hypertensive response to stress. (215/97).  · There was no ST segment deviation noted during stress.  · Overall, the patient's exercise capacity was normal. JOSE MARIA%: 0/1.  · Left ventricular ejection fraction is normal (Calculated EF = 66%).  · Myocardial perfusion imaging indicates a normal myocardial perfusion study with no evidence of ischemia.  · Impressions are consistent with a low risk study.    March 2022  TTE   Normal left ventricle systolic function with an estimated ejection fraction    of 55-60%.    There is grade 3 diastolic dysfunction present.    The left atrium is moderately dilated.    Mild mitral regurgitation is present.    Mild tricuspid regurgitation  is present.    Estimated pulmonary artery systolic pressure is mildly elevated at 37 mmHg.     MRI brain  1. Acute cortical infarction 8 x 4 mm involving the left precentral gyrus     2. Progression of mild cerebral white matter disease, most likely sequela chronic small vessel ischemia. Differential diagnosis also includes but is not limited to: demyelinating disease including multiple sclerosis and Lyme disease, migraine headache   effect, vasculitis, trauma and gliosis.    CTA head neck  1.  No significant stenosis or aneurysm.   2.  No CT correlate for the punctate area of acute ischemia in the left precentral gyrus seen on MRI.   1.  Mild bilateral proximal cervical internal carotid artery stenosis.    ASSESSMENT:   Diagnosis Plan   1. Diastolic dysfunction with chronic heart failure (HCC)        2. Primary hypertension            PLAN:  Congestive heart failure, diastolic, chronic:  No evidence of volume overload.  Continue regular aerobic exercise.  Afterload well controlled.  We will continue to trend symptoms.    Blood pressure lability: Blood pressures currently well controlled.    Sinus bradycardia: Holter monitor consistent with underlying sick sinus physiology, currently asymptomatic. Continue annual clinical follow-up. Currently no indication for pacing.     Hyperlipidemia: Mild elevation in LDL, excellent HDL and triglyceride levels.  Continue dietary modifications and regular exercise.  Not recommending further pharmacologic therapy at this time.    Atrial fibrillation, paroxysmal: Diagnosed after CVA in March 2020.  [id number] equal to 7.  Continue Eliquis 5 mg p.o. twice daily.  Continue as needed beta-blockade.  Not recommending antiarrhythmic therapy at this time.        Subjective  Continues to maintain an active lifestyle.   She reports that her blood pressures are running less than 130/80 mmHg at home.  She has not had to take as needed antihypertensive medications.  Denies chest pain,  "palpitations, presyncope, syncope or dyspnea on exertion.    PHYSICAL EXAMINATION:  Vitals:    10/27/22 1416   BP: 144/60   BP Location: Left arm   Patient Position: Sitting   Pulse: 58   SpO2: 98%   Weight: 75.3 kg (166 lb)   Height: 160 cm (63\")     General Appearance:    Alert, cooperative, no distress, appears stated age   Head:    Normocephalic, without obvious abnormality, atraumatic   Eyes:    conjunctiva/corneas clear   Nose:   Nares normal, septum midline, mucosa normal, no drainage   Throat:   Lips, teeth and gums normal   Neck:   Supple, symmetrical, trachea midline, no carotid    bruit or JVD   Lungs:     Clear to auscultation bilaterally, respirations unlabored   Chest Wall:    No tenderness or deformity    Heart:    Regular rate and rhythm, S1 and S2 normal, no murmur, rub   or gallop, normal carotid impulse bilaterally without bruit.   Abdomen:     Soft, non-tender   Extremities:   Extremities normal, atraumatic, no cyanosis or edema   Pulses:   2+ and symmetric all extremities   Skin:   Skin color, texture, turgor normal, no rashes or lesions       Diagnostic Data:  Procedures  Lab Results   Component Value Date    CHLPL 155 10/05/2022    TRIG 84 10/05/2022    HDL 64 (H) 10/05/2022     Lab Results   Component Value Date    GLUCOSE 96 05/07/2022    BUN 11 05/07/2022    CREATININE 0.73 05/07/2022     05/07/2022    K 4.0 05/07/2022     05/07/2022    CO2 23.5 05/07/2022     Lab Results   Component Value Date    HGBA1C 5.4 03/18/2022     Lab Results   Component Value Date    WBC 7.0 10/05/2022    HGB 12.7 10/05/2022    HCT 40.2 10/05/2022     10/05/2022       Allergies  No Known Allergies    Current Medications    Current Outpatient Medications:   •  acetaminophen (TYLENOL) 500 MG tablet, Take 250 mg by mouth Every 6 (Six) Hours As Needed for Mild Pain., Disp: , Rfl:   •  atorvastatin (LIPITOR) 20 MG tablet, TAKE 1 TABLET BY MOUTH DAILY, Disp: 90 tablet, Rfl: 0  •  Calcium Citrate " (CITRACAL PO), Take 2 tablets by mouth Daily. + Magnesium, Disp: , Rfl:   •  Eliquis 5 MG tablet tablet, TAKE 1 TABLET BY MOUTH EVERY 12 HOURS FOR ATRIAL FIBRILLATION, Disp: 60 tablet, Rfl: 1  •  estradiol (ESTRACE VAGINAL) 0.1 MG/GM vaginal cream, Massage pea size amount into vaginal opening twice weekly, Disp: 42.5 g, Rfl: 1  •  ipratropium (ATROVENT) 0.06 % nasal spray, 1 spray into the nostril(s) as directed by provider As Needed., Disp: , Rfl:   •  levothyroxine (SYNTHROID, LEVOTHROID) 50 MCG tablet, Take 50 mcg by mouth Daily., Disp: , Rfl:   •  metoprolol tartrate (LOPRESSOR) 25 MG tablet, Take 0.25 tablets by mouth As Needed (Sustained HR > 130 over 1 hour). Patient is to take 6.25 mg every 4-6 hours prn for sustained HR > 130 over 1 hour- Please divide tablet if possible prior to patient picking up., Disp: 10 tablet, Rfl: 0  •  Misc Natural Products (OSTEO BI-FLEX TRIPLE STRENGTH PO), Take 1 tablet by mouth 2 (Two) Times a Day., Disp: , Rfl:   •  PARoxetine (PAXIL) 10 MG tablet, Take 10 mg by mouth Every Morning., Disp: , Rfl:   •  ZINC SULFATE PO, Take 1 tablet by mouth Daily., Disp: , Rfl:           ROS  Review of Systems   Cardiovascular: Positive for dyspnea on exertion. Negative for chest pain, irregular heartbeat and near-syncope.   Respiratory: Positive for shortness of breath. Negative for cough.        SOCIAL HX  Social History     Socioeconomic History   • Marital status:    Tobacco Use   • Smoking status: Never   • Smokeless tobacco: Never   Vaping Use   • Vaping Use: Never used   Substance and Sexual Activity   • Alcohol use: No   • Drug use: No   • Sexual activity: Defer     Birth control/protection: Post-menopausal       FAMILY HX  Family History   Problem Relation Age of Onset   • Hypertension Mother    • Colon cancer Father    • Colon cancer Paternal Grandmother    • Colon cancer Paternal Uncle    • Breast cancer Maternal Aunt    • Thyroid disease Other         Nephew              Reinaldo Cope III, MD, FACC

## 2022-11-28 ENCOUNTER — OFFICE VISIT (OUTPATIENT)
Dept: NEUROLOGY | Facility: CLINIC | Age: 77
End: 2022-11-28

## 2022-11-28 VITALS
OXYGEN SATURATION: 98 % | TEMPERATURE: 97.8 F | DIASTOLIC BLOOD PRESSURE: 78 MMHG | BODY MASS INDEX: 28.81 KG/M2 | SYSTOLIC BLOOD PRESSURE: 134 MMHG | HEART RATE: 62 BPM | HEIGHT: 63 IN | WEIGHT: 162.6 LBS

## 2022-11-28 DIAGNOSIS — I10 PRIMARY HYPERTENSION: ICD-10-CM

## 2022-11-28 DIAGNOSIS — I48.0 PAROXYSMAL ATRIAL FIBRILLATION: ICD-10-CM

## 2022-11-28 DIAGNOSIS — E78.5 DYSLIPIDEMIA: ICD-10-CM

## 2022-11-28 DIAGNOSIS — Z86.73 HISTORY OF STROKE: Primary | ICD-10-CM

## 2022-11-28 PROCEDURE — 99214 OFFICE O/P EST MOD 30 MIN: CPT | Performed by: CLINICAL NURSE SPECIALIST

## 2022-11-28 NOTE — PROGRESS NOTES
Follow Up Office Visit      Encounter Date: 2022   Patient Name: Divine Banuelos  : 1945   MRN: [id number]   PCP: Queenie Amezcua APRN    Referring Provider: No ref. provider found     Chief Complaint:    Chief Complaint   Patient presents with   • Follow-up       History of Present Illness:   3/25/2022-   Divine Banuelos is a 76 y.o. female who is here today to establish care.  Patient presents to clinic as a referral from her hospitalization at [facility] and early 2022 where she was noted to have had a tiny area of infarct in her left precentral gyrus.  Apparently she presented to the emergency department with numbness in 2 of her fingers that lasted 1 to 2 minutes, followed by some right hand incoordination the last approximately 1/2-hour and resolved on its own.  Patient had full stroke work-up at the outlying facility, however she does not bring any records or disc with her.  We did receive some records for which I have reviewed.  Prior to admission her known medical diagnoses were depression, hypothyroidism, anxiety and depression.  She was found to be in A. fib while hospitalized and was discharged home on Eliquis 5 mg twice daily, as well as aspirin 81 mg daily.  She continues to take these medications, she denies any overt bleeding.  Her symptoms have resolved and she feels back to baseline.     2022-  Patient presents to clinic in follow-up for previous stroke.  She tells me she feels basically back to her baseline with no new neurologic symptoms.  She does tell me occasionally she will have problems writing with the first 2 fingers on her right hand which were the area of concern with her stroke, however, this only happens occasionally.  She had stopped her atorvastatin and Eliquis for a few days as she was having full body itching and hives.  She was working with cardiologist in trying to determine the source of that, she tells me she has figured out that it was  not medication related that it was related to mold in her house.  She did recently have her lipid panel rechecked.  There was a note from Dr. Cope's office that he was changing her to Xarelto, however that was when they were concerned that Eliquis could be causing her symptoms.  She is now taking Eliquis 5 mg twice daily, cardiology stopped her daily aspirin which I am okay with, and she continues atorvastatin 20 mg nightly for secondary stroke prevention.  Her LDL levels did drop from 155-125 over the last 2 months on 20 mg, patient is reluctant at this time to increase that any further and will continue to work with her primary care on getting her LDL levels lower.  I told her the goal would be less than 70.     11/28/2022-  Patient presents to clinic today accompanied by a friend.  She denies any new neurologic symptoms.  She feels she is at her baseline with no residual symptoms from her March left precentral gyrus infarct.  She continues to take Eliquis 5 mg twice daily, she denies any adverse effects she recently had lipid panel drawn by her primary care in October where her LDL was revealed to be down to 74.  She continues to take atorvastatin 20 mg nightly with no adverse effects      Subjective        I have reviewed and the following portions of the patient's history were updated as appropriate: past family history, past medical history, past social history, past surgical history and problem list.    Medications:     Current Outpatient Medications:   •  acetaminophen (TYLENOL) 500 MG tablet, Take 250 mg by mouth Every 6 (Six) Hours As Needed for Mild Pain., Disp: , Rfl:   •  atorvastatin (LIPITOR) 20 MG tablet, TAKE 1 TABLET BY MOUTH DAILY, Disp: 90 tablet, Rfl: 0  •  Calcium Citrate (CITRACAL PO), Take 2 tablets by mouth Daily. + Magnesium, Disp: , Rfl:   •  Eliquis 5 MG tablet tablet, TAKE 1 TABLET BY MOUTH EVERY 12 HOURS FOR ATRIAL FIBRILLATION, Disp: 60 tablet, Rfl: 1  •  estradiol (ESTRACE VAGINAL)  "0.1 MG/GM vaginal cream, Massage pea size amount into vaginal opening twice weekly, Disp: 42.5 g, Rfl: 1  •  levothyroxine (SYNTHROID, LEVOTHROID) 50 MCG tablet, Take 50 mcg by mouth Daily., Disp: , Rfl:   •  Misc Natural Products (OSTEO BI-FLEX TRIPLE STRENGTH PO), Take 1 tablet by mouth 2 (Two) Times a Day., Disp: , Rfl:   •  PARoxetine (PAXIL) 10 MG tablet, Take 10 mg by mouth Every Morning., Disp: , Rfl:   •  ZINC SULFATE PO, Take 1 tablet by mouth Daily., Disp: , Rfl:   •  ipratropium (ATROVENT) 0.06 % nasal spray, 1 spray into the nostril(s) as directed by provider As Needed., Disp: , Rfl:   •  metoprolol tartrate (LOPRESSOR) 25 MG tablet, Take 0.25 tablets by mouth As Needed (Sustained HR > 130 over 1 hour). Patient is to take 6.25 mg every 4-6 hours prn for sustained HR > 130 over 1 hour- Please divide tablet if possible prior to patient picking up., Disp: 10 tablet, Rfl: 0    Allergies:   No Known Allergies    Objective     Physical Exam:   Vital Signs:   Vitals:    11/28/22 1328   BP: 134/78   Pulse: 62   Temp: 97.8 °F (36.6 °C)   SpO2: 98%   Weight: 73.8 kg (162 lb 9.6 oz)   Height: 160 cm (62.99\")     Body mass index is 28.81 kg/m².    Physical Exam  Vitals reviewed.   Constitutional:       Appearance: Normal appearance.   HENT:      Head: Normocephalic.      Mouth/Throat:      Mouth: Mucous membranes are moist.   Eyes:      Extraocular Movements: Extraocular movements intact.   Cardiovascular:      Rate and Rhythm: Normal rate and regular rhythm.   Pulmonary:      Effort: Pulmonary effort is normal.   Skin:     General: Skin is warm and dry.   Neurological:      General: No focal deficit present.      Motor: Motor strength is normal.      Coordination: Coordination is intact.      Deep Tendon Reflexes:      Reflex Scores:       Tricep reflexes are 2+ on the right side and 2+ on the left side.       Bicep reflexes are 2+ on the right side and 2+ on the left side.       Patellar reflexes are 2+ on the " right side and 2+ on the left side.  Psychiatric:         Mood and Affect: Mood normal.         Speech: Speech normal.       Neurological Exam  Mental Status  Awake, alert and oriented to person, place and time. Speech is normal. Language is fluent with no aphasia. Fund of knowledge is appropriate for level of education.    Cranial Nerves  CN II: Visual fields full to confrontation.  CN III, IV, VI: Extraocular movements intact bilaterally.  CN V: Facial sensation is normal.  CN VII: Full and symmetric facial movement.  CN VIII: Hearing appears intact.  CN IX, X: Palate elevates symmetrically  CN XI: Shoulder shrug strength is normal.  CN XII: Tongue midline without atrophy or fasciculations.    Motor   Strength is 5/5 throughout all four extremities.    Sensory  Light touch is normal in upper and lower extremities.     Reflexes                                            Right                      Left  Biceps                                 2+                         2+  Triceps                                2+                         2+  Patellar                                2+                         2+    Coordination    Finger-to-nose, rapid alternating movements and heel-to-shin normal bilaterally without dysmetria.    Gait  Casual gait is normal including stance, stride, and arm swing.        Procedures    10/5/2022-hemoglobin 12.7, hematocrit 40.2, platelets 278, LDL 74, AST 25, ALT 21, alk phos 119    NIH Stroke Scale    1a  Level of consciousness: 0=alert; keenly responsive   1b. LOC questions:  0=Answers both questions correctly    1c. LOC commands: 0=Performs both tasks correctly   2.  Best Gaze: 0=normal   3. Visual: 0=No visual loss   4. Facial Palsy: 0=Normal symmetric movement   5a. Motor left arm: 0=No drift, limb holds 90 (or 45) degrees for full 10 seconds   5b.  Motor right arm: 0=No drift, limb holds 90 (or 45) degrees for full 10 seconds   6a. Motor left le=No drift, limb holds 90 (or  45) degrees for full 10 seconds   6b  Motor right le=No drift, limb holds 90 (or 45) degrees for full 10 seconds   7. Limb Ataxia: 0=Absent   8.  Sensory: 0=Normal; no sensory loss   9. Best Language:  0=No aphasia, normal   10. Dysarthria: 0=Normal   11. Extinction and Inattention: 0=No abnormality    Total:   0         MODIFIED RUBIN SCALE (to be assessed for each patient having history of stroke) []Stroke history but not assessed  [x]0: No symptoms at all  []1: No significant disability despite symptoms  []2: Slight disability  []3: Moderate disability  []4: Moderately severe disability  []5: Severe disability  []6: Death     Assessment / Plan      Assessment/Plan:   Diagnoses and all orders for this visit:    1. History of stroke (Primary)  -Patient will continue Eliquis 5 mg twice daily for secondary stroke prevention  -Reviewed signs and symptoms of stroke and when to call 911    2. Primary hypertension  -Blood pressure today 134/78  -Encourage patient to keep close follow-up with primary care for systolic blood pressure goal 120-140    3. mild Dyslipidemia  -Recent LDL 74, goal less than 7  -Continue atorvastatin 20 mg nightly for now  -Continue to work closely with primary care for follow-up on lipid panel and liver    4. Paroxysmal atrial fibrillation (HCC)  -Eliquis 5 mg twice daily    Reviewed plan as above with patient.  All questions answered.  Patient will see us back for 1 year follow-up in approximately 4 months or sooner if needed.         Follow Up:   Return in about 4 months (around 3/28/2023).    GILBERT Andino  [facility] [place] [facility]

## 2022-11-29 ENCOUNTER — TELEPHONE (OUTPATIENT)
Dept: SURGERY | Facility: CLINIC | Age: 77
End: 2022-11-29

## 2022-11-29 NOTE — TELEPHONE ENCOUNTER
Pt reassured that the tenderness was normal.  She is going to watch it for a couple months and if anything worsens she knows to call back for an appt.

## 2022-11-29 NOTE — TELEPHONE ENCOUNTER
Provider: NII  Caller: FLORES LUIS  Relationship to Patient: SELF  Phone Number: [phone]    PT CAN BE REACHED AT ANYTIME, IF NO ANSWER A DETAILED MSG CAN BE LEFT     Reason for Call: PT HAS CONCERNS WITH HER RIGHT BREAST. SHE HAS BEEN HAVING TENDERNESS TO THE TOUCH AND OCCASIONAL PAIN. PT DOES STATE THAT SHE DRINKS CAFFEINE BUT IT ISN'T A LOT.     When was the patient last seen: 8.24.22

## 2022-12-12 RX ORDER — APIXABAN 5 MG/1
TABLET, FILM COATED ORAL
Qty: 60 TABLET | Refills: 3 | Status: SHIPPED | OUTPATIENT
Start: 2022-12-12

## 2023-01-09 RX ORDER — ATORVASTATIN CALCIUM 20 MG/1
20 TABLET, FILM COATED ORAL DAILY
Qty: 90 TABLET | Refills: 3 | Status: SHIPPED | OUTPATIENT
Start: 2023-01-09

## 2023-01-10 ENCOUNTER — TELEPHONE (OUTPATIENT)
Dept: ORTHOPEDIC SURGERY | Facility: CLINIC | Age: 78
End: 2023-01-10

## 2023-01-10 NOTE — TELEPHONE ENCOUNTER
Provider: JENNIFER PATTERSON    Caller: FOLRES LUIS     Relationship to Patient: SELF     Phone Number: [phone]    Reason for Call: PATIENT WOULD LIKE TO SCHEDULE FOR AN INJECTION PLEASE ADVISE

## 2023-01-17 ENCOUNTER — OFFICE VISIT (OUTPATIENT)
Dept: ORTHOPEDIC SURGERY | Facility: CLINIC | Age: 78
End: 2023-01-17
Payer: MEDICARE

## 2023-01-17 VITALS — TEMPERATURE: 97.5 F | HEIGHT: 63 IN | WEIGHT: 162.4 LBS | BODY MASS INDEX: 28.77 KG/M2

## 2023-01-17 DIAGNOSIS — M75.02 ADHESIVE CAPSULITIS OF LEFT SHOULDER: ICD-10-CM

## 2023-01-17 DIAGNOSIS — M25.512 CHRONIC LEFT SHOULDER PAIN: ICD-10-CM

## 2023-01-17 DIAGNOSIS — G89.29 CHRONIC LEFT SHOULDER PAIN: ICD-10-CM

## 2023-01-17 DIAGNOSIS — M75.52 ACUTE BURSITIS OF LEFT SHOULDER: Primary | ICD-10-CM

## 2023-01-17 PROCEDURE — 20610 DRAIN/INJ JOINT/BURSA W/O US: CPT | Performed by: PHYSICIAN ASSISTANT

## 2023-01-17 RX ORDER — LIDOCAINE HYDROCHLORIDE 10 MG/ML
2 INJECTION, SOLUTION INFILTRATION; PERINEURAL
Status: COMPLETED | OUTPATIENT
Start: 2023-01-17 | End: 2023-01-17

## 2023-01-17 RX ORDER — METHYLPREDNISOLONE ACETATE 40 MG/ML
40 INJECTION, SUSPENSION INTRA-ARTICULAR; INTRALESIONAL; INTRAMUSCULAR; SOFT TISSUE
Status: COMPLETED | OUTPATIENT
Start: 2023-01-17 | End: 2023-01-17

## 2023-01-17 RX ADMIN — METHYLPREDNISOLONE ACETATE 40 MG: 40 INJECTION, SUSPENSION INTRA-ARTICULAR; INTRALESIONAL; INTRAMUSCULAR; SOFT TISSUE at 11:10

## 2023-01-17 RX ADMIN — LIDOCAINE HYDROCHLORIDE 2 ML: 10 INJECTION, SOLUTION INFILTRATION; PERINEURAL at 11:10

## 2023-01-17 NOTE — PROGRESS NOTES
"Subjective   Divine Banuelos is a 77 y.o. female here today for injection therapy.    Chief Complaint   Patient presents with   • Left Shoulder - Injections   patient presents for left shoulder cortisone injection.    Past Medical History:   Diagnosis Date   • Anxiety    • Arthritis    • Atrial fibrillation (HCC)    • Bell's palsy    • Bronchitis    • Cataract     bilateral   • Depression    • Diastolic heart failure (HCC)     srr Dr Cope note   • GERD (gastroesophageal reflux disease)    • History of exercise stress test 2019    patient states it was normal   • Hyperlipidemia    • Hypertension     not currently taking medication for BP   • Hypothyroidism    • Malignant neoplasm of upper-outer quadrant of right breast in female, estrogen receptor positive (HCC) 10/30/2020   • Mitral valve prolapse    • Osteoporosis    • Pessary maintenance    • Stroke (HCC)    • Wears dentures     upper and lowers         Past Surgical History:   Procedure Laterality Date   • BREAST BIOPSY Right 8/17/2022    Procedure: BREAST BIOPSY;  Surgeon: Stuart Caceres MD;  Location: [facility];  Service: General;  Laterality: Right;   • BREAST LUMPECTOMY WITH SENTINEL NODE BIOPSY Right 11/4/2020    Procedure: BREAST LUMPECTOMY WITH SENTINEL NODE BIOPSY RIGHT;  Surgeon: Stuart Caceres MD;  Location: [facility] OR;  Service: General;  Laterality: Right;   • CATARACT EXTRACTION Bilateral    • CATARACT EXTRACTION, BILATERAL     • COLONOSCOPY     • RETINAL DETACHMENT REPAIR Right        No Known Allergies    Objective   Temp 97.5 °F (36.4 °C)   Ht 160 cm (62.99\")   Wt 73.7 kg (162 lb 6.4 oz)   LMP  (LMP Unknown)   BMI 28.78 kg/m²    Physical Exam  Vitals and nursing note reviewed.   Constitutional:       Appearance: Normal appearance.   Pulmonary:      Effort: Pulmonary effort is normal.   Musculoskeletal:      Left shoulder: Tenderness present. Decreased range of motion.   Neurological:      Mental Status: She is alert and oriented to person, " place, and time.         Skin exam stable with no erythema, ecchymosis or rash.  No new swelling.  No motor or sensory changes.  Distal pulse intact.    Large Joint Arthrocentesis: L subacromial bursa  Date/Time: 1/17/2023 11:10 AM  Consent given by: patient  Site marked: site marked  Timeout: Immediately prior to procedure a time out was called to verify the correct patient, procedure, equipment, support staff and site/side marked as required   Supporting Documentation  Indications: pain   Procedure Details  Location: shoulder - L subacromial bursa  Preparation: Patient was prepped and draped in the usual sterile fashion  Needle size: 22 G  Approach: posterior  Medications administered: 40 mg methylPREDNISolone acetate 40 MG/ML; 2 mL lidocaine 1 %  Patient tolerance: patient tolerated the procedure well with no immediate complications          Assessment & Plan      Diagnosis Plan   1. Acute bursitis of left shoulder        2. Chronic left shoulder pain        3. Adhesive capsulitis of left shoulder            Discussion of orthopaedic goals and activities and patient expressed appreciation.  Guided on proper techniques for mobility, strength, agility and/or conditioning exercises  Ice, heat, and/or modalities as beneficial  Watch for signs and symptoms of infection  Call or notify for any adverse effect from injection therapy    Recommendations:  Follow up in 3 months  I did instruct patient on some home exercises to stretch and mobilize the left shoulder.  I believe she is developing adhesive capsulitis of the left shoulder    Patient agreeable to call or return sooner for any concerns.

## 2023-03-01 ENCOUNTER — HOSPITAL ENCOUNTER (OUTPATIENT)
Facility: HOSPITAL | Age: 78
Discharge: HOME | End: 2023-03-01
Payer: MEDICARE

## 2023-03-01 LAB
A/G RATIO: 1.6 (ref 0.8–2)
ALBUMIN SERPL-MCNC: 4.1 G/DL (ref 3.4–4.8)
ALP BLD-CCNC: 131 U/L (ref 25–100)
ALT SERPL-CCNC: 26 U/L (ref 4–36)
ANION GAP SERPL CALCULATED.3IONS-SCNC: 11 MMOL/L (ref 3–16)
AST SERPL-CCNC: 22 U/L (ref 8–33)
BASOPHILS ABSOLUTE: 0 K/UL (ref 0–0.1)
BASOPHILS RELATIVE PERCENT: 0.3 %
BILIRUB SERPL-MCNC: 0.3 MG/DL (ref 0.3–1.2)
BUN BLDV-MCNC: 13 MG/DL (ref 6–20)
CALCIUM SERPL-MCNC: 9.8 MG/DL (ref 8.5–10.5)
CHLORIDE BLD-SCNC: 107 MMOL/L (ref 98–107)
CHOLESTEROL, TOTAL: 158 MG/DL (ref 0–200)
CO2: 25 MMOL/L (ref 20–30)
CREAT SERPL-MCNC: 0.8 MG/DL (ref 0.4–1.2)
EOSINOPHILS ABSOLUTE: 0.2 K/UL (ref 0–0.4)
EOSINOPHILS RELATIVE PERCENT: 2.4 %
FOLATE: >20 NG/ML
GFR SERPL CREATININE-BSD FRML MDRD: >60 ML/MIN/{1.73_M2}
GLOBULIN: 2.5 G/DL
GLUCOSE BLD-MCNC: 108 MG/DL (ref 74–106)
HBA1C MFR BLD: 6.1 %
HCT VFR BLD CALC: 44 % (ref 37–47)
HDLC SERPL-MCNC: 64 MG/DL (ref 40–60)
HEMOGLOBIN: 14.2 G/DL (ref 11.5–16.5)
IMMATURE GRANULOCYTES #: 0 K/UL
IMMATURE GRANULOCYTES %: 0.1 % (ref 0–5)
LDL CHOLESTEROL CALCULATED: 80 MG/DL
LYMPHOCYTES ABSOLUTE: 1.9 K/UL (ref 1.5–4)
LYMPHOCYTES RELATIVE PERCENT: 26.5 %
MCH RBC QN AUTO: 29 PG (ref 27–32)
MCHC RBC AUTO-ENTMCNC: 32.3 G/DL (ref 31–35)
MCV RBC AUTO: 90 FL (ref 80–100)
MONOCYTES ABSOLUTE: 0.4 K/UL (ref 0.2–0.8)
MONOCYTES RELATIVE PERCENT: 6 %
NEUTROPHILS ABSOLUTE: 4.5 K/UL (ref 2–7.5)
NEUTROPHILS RELATIVE PERCENT: 64.7 %
PDW BLD-RTO: 13.2 % (ref 11–16)
PLATELET # BLD: 286 K/UL (ref 150–400)
PMV BLD AUTO: 9.4 FL (ref 6–10)
POTASSIUM SERPL-SCNC: 4.2 MMOL/L (ref 3.4–5.1)
RBC # BLD: 4.89 M/UL (ref 3.8–5.8)
SODIUM BLD-SCNC: 143 MMOL/L (ref 136–145)
TOTAL PROTEIN: 6.6 G/DL (ref 6.4–8.3)
TRIGL SERPL-MCNC: 70 MG/DL (ref 0–249)
TSH SERPL DL<=0.05 MIU/L-ACNC: 3.19 UIU/ML (ref 0.27–4.2)
VITAMIN B-12: 415 PG/ML (ref 211–911)
VLDLC SERPL CALC-MCNC: 14 MG/DL
WBC # BLD: 7 K/UL (ref 4–11)

## 2023-03-01 PROCEDURE — 80053 COMPREHEN METABOLIC PANEL: CPT

## 2023-03-01 PROCEDURE — 83036 HEMOGLOBIN GLYCOSYLATED A1C: CPT

## 2023-03-01 PROCEDURE — 85025 COMPLETE CBC W/AUTO DIFF WBC: CPT

## 2023-03-01 PROCEDURE — 82607 VITAMIN B-12: CPT

## 2023-03-01 PROCEDURE — 80061 LIPID PANEL: CPT

## 2023-03-01 PROCEDURE — 84443 ASSAY THYROID STIM HORMONE: CPT

## 2023-03-01 PROCEDURE — 82746 ASSAY OF FOLIC ACID SERUM: CPT

## 2023-03-01 PROCEDURE — 87086 URINE CULTURE/COLONY COUNT: CPT

## 2023-03-02 LAB — URINE CULTURE, ROUTINE: NORMAL

## 2023-03-16 ENCOUNTER — HOSPITAL ENCOUNTER (OUTPATIENT)
Dept: ULTRASOUND IMAGING | Facility: HOSPITAL | Age: 78
Discharge: HOME | End: 2023-03-16
Payer: MEDICARE

## 2023-03-16 DIAGNOSIS — R74.01 ELEVATED TRANSAMINASE LEVEL: ICD-10-CM

## 2023-03-16 PROCEDURE — 76705 ECHO EXAM OF ABDOMEN: CPT

## 2023-04-07 RX ORDER — APIXABAN 5 MG/1
TABLET, FILM COATED ORAL
Qty: 60 TABLET | Refills: 3 | Status: SHIPPED | OUTPATIENT
Start: 2023-04-07

## 2023-04-07 RX ORDER — APIXABAN 5 MG/1
TABLET, FILM COATED ORAL
Qty: 60 TABLET | Refills: 3 | OUTPATIENT
Start: 2023-04-07

## 2023-04-20 ENCOUNTER — TELEPHONE (OUTPATIENT)
Dept: ORTHOPEDIC SURGERY | Facility: CLINIC | Age: 78
End: 2023-04-20

## 2023-04-20 NOTE — TELEPHONE ENCOUNTER
Caller: Divine Banuelos    Relationship to patient: Self    Best call back number:     Chief complaint: LEFT SHOULDER PAIN    Type of visit: CORTISONE INJECTION    Requested date: ASAP - THURSDAYS PREFERRED, LATE MORNING OR EARLY AFTERNOON PREFERRED

## 2023-04-27 ENCOUNTER — OFFICE VISIT (OUTPATIENT)
Dept: CARDIOLOGY | Facility: CLINIC | Age: 78
End: 2023-04-27
Payer: MEDICARE

## 2023-04-27 VITALS
SYSTOLIC BLOOD PRESSURE: 160 MMHG | HEART RATE: 81 BPM | BODY MASS INDEX: 28.17 KG/M2 | OXYGEN SATURATION: 97 % | DIASTOLIC BLOOD PRESSURE: 70 MMHG | WEIGHT: 159 LBS | HEIGHT: 63 IN

## 2023-04-27 DIAGNOSIS — I50.32 DIASTOLIC DYSFUNCTION WITH CHRONIC HEART FAILURE: ICD-10-CM

## 2023-04-27 DIAGNOSIS — I48.0 PAROXYSMAL ATRIAL FIBRILLATION: Primary | ICD-10-CM

## 2023-04-27 DIAGNOSIS — I10 PRIMARY HYPERTENSION: ICD-10-CM

## 2023-04-27 RX ORDER — BUPROPION HYDROCHLORIDE 150 MG/1
1 TABLET ORAL DAILY
COMMUNITY
Start: 2023-04-25

## 2023-04-27 NOTE — PROGRESS NOTES
[facility]  Office visit  Divine Banuelos  1945  [phone]    VISIT DATE:  4/27/2023      PCP: Queenie Amezcua, APRN  [address] [place] [id number]    CC:  Chief Complaint   Patient presents with   • Atrial Fibrillation   • Hypertension   • Diastolic dysfunction with chronic heart failure   • Dizziness              PROBLEM LIST:  1. Hypertension:  a.  History of elevated blood pressure due to stress.  b.  Discontinuation of enalapril secondary to hypotension, June 2013.  2. Bradycardia-Holter October 2017: Average heart rate 52 bpm, range of 36-94 bpm. Asymptomatic  3. Hypothyroidism.  4. Depression.  5. Anxiety.  6. Osteoporosis, mild.  7. Mild dyslipidemia.  8. History of Bell’s palsy.  9. Surgical history:  a. Bilateral cataract extraction.    Cardiac studies and procedures:  September 2019  Echo  · Estimated EF appears to be in the range of 56 - 60%.  · Left ventricular diastolic dysfunction (grade II) consistent with pseudonormalization.  · Left atrial cavity size is borderline dilated.  · Mild mitral valve regurgitation is present  Exercise myocardial perfusion imaging  · A stress test was performed following the Fredis protocol.  · The patient reported shortness of breath during the stress test.  · Blood pressure demonstrated a hypertensive response to stress. (215/97).  · There was no ST segment deviation noted during stress.  · Overall, the patient's exercise capacity was normal. JOSE MARIA%: 0/1.  · Left ventricular ejection fraction is normal (Calculated EF = 66%).  · Myocardial perfusion imaging indicates a normal myocardial perfusion study with no evidence of ischemia.  · Impressions are consistent with a low risk study.    March 2022  TTE   Normal left ventricle systolic function with an estimated ejection fraction    of 55-60%.    There is grade 3 diastolic dysfunction present.    The left atrium is moderately dilated.    Mild mitral regurgitation is present.    Mild  tricuspid regurgitation is present.    Estimated pulmonary artery systolic pressure is mildly elevated at 37 mmHg.     MRI brain  1. Acute cortical infarction 8 x 4 mm involving the left precentral gyrus     2. Progression of mild cerebral white matter disease, most likely sequela chronic small vessel ischemia. Differential diagnosis also includes but is not limited to: demyelinating disease including multiple sclerosis and Lyme disease, migraine headache   effect, vasculitis, trauma and gliosis.    CTA head neck  1.  No significant stenosis or aneurysm.   2.  No CT correlate for the punctate area of acute ischemia in the left precentral gyrus seen on MRI.   1.  Mild bilateral proximal cervical internal carotid artery stenosis.    ASSESSMENT:   Diagnosis Plan   1. Paroxysmal atrial fibrillation        2. Primary hypertension        3. Diastolic dysfunction with chronic heart failure            PLAN:  Congestive heart failure, diastolic, chronic:  No evidence of volume overload.  Continue regular aerobic exercise.  Afterload well controlled.  We will continue to trend symptoms.    Blood pressure lability: Blood pressures currently well controlled.    Sinus bradycardia: Holter monitor consistent with underlying sick sinus physiology, currently asymptomatic. Continue annual clinical follow-up. Currently no indication for pacing.     Hyperlipidemia: Well-controlled on previous medical therapy of atorvastatin 20 mg p.o. daily.  Continue dietary modifications and regular exercise.  Did not see any significant recent LFT abnormalities.  We will continue to trend lipid profile off therapy and reinitiate if indicated.    Atrial fibrillation, paroxysmal: Diagnosed after CVA in March 2020.  [id number] equal to 7.  Continue Eliquis 5 mg p.o. twice daily.  Continue as needed beta-blockade.  Not recommending antiarrhythmic therapy at this time.        Subjective  Continues to maintain an active lifestyle.   Has not been  "checking home blood pressures recently.  Denies chest pain, palpitations, presyncope, syncope or dyspnea on exertion.  Stopped taking statin after she was told that her liver function tests are abnormal.    PHYSICAL EXAMINATION:  Vitals:    04/27/23 1429   BP: 160/70   BP Location: Right arm   Patient Position: Sitting   Pulse: 81   SpO2: 97%   Weight: 72.1 kg (159 lb)   Height: 160 cm (63\")     General Appearance:    Alert, cooperative, no distress, appears stated age   Head:    Normocephalic, without obvious abnormality, atraumatic   Eyes:    conjunctiva/corneas clear   Nose:   Nares normal, septum midline, mucosa normal, no drainage   Throat:   Lips, teeth and gums normal   Neck:   Supple, symmetrical, trachea midline, no carotid    bruit or JVD   Lungs:     Clear to auscultation bilaterally, respirations unlabored   Chest Wall:    No tenderness or deformity    Heart:   Irregularly irregular, S1 and S2 normal, no murmur, rub   or gallop, normal carotid impulse bilaterally without bruit.   Abdomen:     Soft, non-tender   Extremities:   Extremities normal, atraumatic, no cyanosis or edema   Pulses:   2+ and symmetric all extremities   Skin:   Skin color, texture, turgor normal, no rashes or lesions       Diagnostic Data:  Procedures  Lab Results   Component Value Date    CHLPL 158 03/01/2023    TRIG 70 03/01/2023    HDL 64 (H) 03/01/2023     Lab Results   Component Value Date    GLUCOSE 96 05/07/2022    BUN 11 05/07/2022    CREATININE 0.73 05/07/2022     05/07/2022    K 4.0 05/07/2022     05/07/2022    CO2 23.5 05/07/2022     Lab Results   Component Value Date    HGBA1C 6.1 (H) 03/01/2023     Lab Results   Component Value Date    WBC 7.0 03/01/2023    HGB 14.2 03/01/2023    HCT 44.0 03/01/2023     03/01/2023       Allergies  No Known Allergies    Current Medications    Current Outpatient Medications:   •  acetaminophen (TYLENOL) 500 MG tablet, Take 250 mg by mouth Every 6 (Six) Hours As Needed " for Mild Pain., Disp: , Rfl:   •  buPROPion XL (WELLBUTRIN XL) 150 MG 24 hr tablet, Take 1 tablet by mouth Daily., Disp: , Rfl:   •  Calcium Citrate (CITRACAL PO), Take 2 tablets by mouth Daily. + Magnesium, Disp: , Rfl:   •  Eliquis 5 MG tablet tablet, TAKE 1 TABLET BY MOUTH EVERY 12 HOURS FOR ATRIAL FIBRILLATION, Disp: 60 tablet, Rfl: 3  •  estradiol (ESTRACE VAGINAL) 0.1 MG/GM vaginal cream, Massage pea size amount into vaginal opening twice weekly, Disp: 42.5 g, Rfl: 1  •  ipratropium (ATROVENT) 0.06 % nasal spray, 1 spray into the nostril(s) as directed by provider As Needed., Disp: , Rfl:   •  levothyroxine (SYNTHROID, LEVOTHROID) 50 MCG tablet, Take 1 tablet by mouth Daily., Disp: , Rfl:   •  metoprolol tartrate (LOPRESSOR) 25 MG tablet, Take 0.25 tablets by mouth As Needed (Sustained HR > 130 over 1 hour). Patient is to take 6.25 mg every 4-6 hours prn for sustained HR > 130 over 1 hour- Please divide tablet if possible prior to patient picking up., Disp: 10 tablet, Rfl: 0  •  Misc Natural Products (OSTEO BI-FLEX TRIPLE STRENGTH PO), Take 1 tablet by mouth 2 (Two) Times a Day., Disp: , Rfl:   •  PARoxetine (PAXIL) 10 MG tablet, Take 1 tablet by mouth Every Morning., Disp: , Rfl:   •  ZINC SULFATE PO, Take 1 tablet by mouth Daily., Disp: , Rfl:   •  atorvastatin (LIPITOR) 20 MG tablet, TAKE 1 TABLET BY MOUTH DAILY, Disp: 90 tablet, Rfl: 3          ROS  Review of Systems   Cardiovascular: Positive for dyspnea on exertion. Negative for chest pain, irregular heartbeat and near-syncope.   Respiratory: Positive for shortness of breath. Negative for cough.        SOCIAL HX  Social History     Socioeconomic History   • Marital status:    Tobacco Use   • Smoking status: Never     Passive exposure: Never   • Smokeless tobacco: Never   Vaping Use   • Vaping Use: Never used   Substance and Sexual Activity   • Alcohol use: No   • Drug use: No   • Sexual activity: Defer     Birth control/protection:  Post-menopausal       FAMILY HX  Family History   Problem Relation Age of Onset   • Hypertension Mother    • Colon cancer Father    • Colon cancer Paternal Grandmother    • Colon cancer Paternal Uncle    • Breast cancer Maternal Aunt    • Thyroid disease Other         Nephew             Reinaldo Cope III, MD, FACC

## 2023-05-03 ENCOUNTER — HOSPITAL ENCOUNTER (OUTPATIENT)
Facility: HOSPITAL | Age: 78
Discharge: HOME | End: 2023-05-03

## 2023-05-10 ENCOUNTER — OFFICE VISIT (OUTPATIENT)
Dept: ORTHOPEDIC SURGERY | Facility: CLINIC | Age: 78
End: 2023-05-10
Payer: MEDICARE

## 2023-05-10 VITALS
HEIGHT: 63 IN | SYSTOLIC BLOOD PRESSURE: 152 MMHG | DIASTOLIC BLOOD PRESSURE: 76 MMHG | WEIGHT: 159 LBS | BODY MASS INDEX: 28.17 KG/M2

## 2023-05-10 DIAGNOSIS — G89.29 CHRONIC LEFT SHOULDER PAIN: Primary | ICD-10-CM

## 2023-05-10 DIAGNOSIS — M75.52 ACUTE BURSITIS OF LEFT SHOULDER: ICD-10-CM

## 2023-05-10 DIAGNOSIS — M25.512 CHRONIC LEFT SHOULDER PAIN: Primary | ICD-10-CM

## 2023-05-10 DIAGNOSIS — M75.02 ADHESIVE CAPSULITIS OF LEFT SHOULDER: ICD-10-CM

## 2023-05-10 RX ORDER — LIDOCAINE HYDROCHLORIDE 20 MG/ML
2 INJECTION, SOLUTION INFILTRATION; PERINEURAL
Status: COMPLETED | OUTPATIENT
Start: 2023-05-10 | End: 2023-05-10

## 2023-05-10 RX ORDER — METHYLPREDNISOLONE ACETATE 40 MG/ML
40 INJECTION, SUSPENSION INTRA-ARTICULAR; INTRALESIONAL; INTRAMUSCULAR; SOFT TISSUE
Status: COMPLETED | OUTPATIENT
Start: 2023-05-10 | End: 2023-05-10

## 2023-05-10 RX ADMIN — LIDOCAINE HYDROCHLORIDE 2 ML: 20 INJECTION, SOLUTION INFILTRATION; PERINEURAL at 10:55

## 2023-05-10 RX ADMIN — METHYLPREDNISOLONE ACETATE 40 MG: 40 INJECTION, SUSPENSION INTRA-ARTICULAR; INTRALESIONAL; INTRAMUSCULAR; SOFT TISSUE at 10:55

## 2023-05-10 NOTE — PROGRESS NOTES
"Subjective   Divine Banuelos is a 77 y.o. female here today for injection therapy.    Chief Complaint   Patient presents with   • Left Shoulder - Injections     Last injection 1/17/2023.     Patient would like a repeat left shoulder cortisone injection.  Past Medical History:   Diagnosis Date   • Anxiety    • Arthritis    • Atrial fibrillation    • Bell's palsy    • Bronchitis    • Cataract     bilateral   • Depression    • Diastolic heart failure     srr Dr Cope note   • GERD (gastroesophageal reflux disease)    • History of exercise stress test 2019    patient states it was normal   • Hyperlipidemia    • Hypertension     not currently taking medication for BP   • Hypothyroidism    • Malignant neoplasm of upper-outer quadrant of right breast in female, estrogen receptor positive 10/30/2020   • Mitral valve prolapse    • Osteoporosis    • Pessary maintenance    • Stroke    • Wears dentures     upper and lowers         Past Surgical History:   Procedure Laterality Date   • BREAST BIOPSY Right 8/17/2022    Procedure: BREAST BIOPSY;  Surgeon: Stuart Caceres MD;  Location: [facility];  Service: General;  Laterality: Right;   • BREAST LUMPECTOMY WITH SENTINEL NODE BIOPSY Right 11/4/2020    Procedure: BREAST LUMPECTOMY WITH SENTINEL NODE BIOPSY RIGHT;  Surgeon: Stuart Caceres MD;  Location: [facility] OR;  Service: General;  Laterality: Right;   • CATARACT EXTRACTION Bilateral    • CATARACT EXTRACTION, BILATERAL     • COLONOSCOPY     • RETINAL DETACHMENT REPAIR Right        No Known Allergies    Objective   /76   Ht 160 cm (62.99\")   Wt 72.1 kg (159 lb)   LMP  (LMP Unknown)   BMI 28.17 kg/m²    Physical Exam    Skin exam stable with no erythema, ecchymosis or rash.  No new swelling.  No motor or sensory changes.  Distal pulse intact.    Large Joint Arthrocentesis: L subacromial bursa  Date/Time: 5/10/2023 10:55 AM  Consent given by: patient  Site marked: site marked  Timeout: Immediately prior to procedure a " time out was called to verify the correct patient, procedure, equipment, support staff and site/side marked as required   Supporting Documentation  Indications: pain   Procedure Details  Location: shoulder - L subacromial bursa  Preparation: Patient was prepped and draped in the usual sterile fashion  Needle size: 22 G  Approach: posterior  Medications administered: 2 mL lidocaine 2%; 40 mg methylPREDNISolone acetate 40 MG/ML  Patient tolerance: patient tolerated the procedure well with no immediate complications          Assessment & Plan      Diagnosis Plan   1. Chronic left shoulder pain        2. Acute bursitis of left shoulder        3. Adhesive capsulitis of left shoulder            Discussion of orthopaedic goals and activities and patient expressed appreciation.  Guided on proper techniques for mobility, strength, agility and/or conditioning exercises  Ice, heat, and/or modalities as beneficial  Watch for signs and symptoms of infection  Call or notify for any adverse effect from injection therapy    Recommendations:  Follow up in 3months    Patient agreeable to call or return sooner for any concerns.

## 2023-05-15 ENCOUNTER — HOSPITAL ENCOUNTER (OUTPATIENT)
Facility: HOSPITAL | Age: 78
Discharge: HOME | End: 2023-05-15
Payer: MEDICARE

## 2023-05-15 PROCEDURE — 87086 URINE CULTURE/COLONY COUNT: CPT

## 2023-05-18 LAB — BACTERIA UR CULT: NORMAL

## 2023-06-07 NOTE — PROGRESS NOTES
Follow Up Office Visit      Encounter Date: 2023   Patient Name: Divine Banuelos  : 1945   MRN: [id number]   PCP: Queenie Amezcua APRN    Referring Provider: No ref. provider found     Chief Complaint:    Chief Complaint   Patient presents with    Follow-up       History of Present Illness: Divine Banuelos is a 77 y.o. female who is here today to follow up with stroke.   3/25/2022-   Divine Banuelos is a 76 y.o. female who is here today to establish care.  Patient presents to clinic as a referral from her hospitalization at [facility] and early 2022 where she was noted to have had a tiny area of infarct in her left precentral gyrus.  Apparently she presented to the emergency department with numbness in 2 of her fingers that lasted 1 to 2 minutes, followed by some right hand incoordination the last approximately 1/2-hour and resolved on its own.  Patient had full stroke work-up at the outlying facility, however she does not bring any records or disc with her.  We did receive some records for which I have reviewed.  Prior to admission her known medical diagnoses were depression, hypothyroidism, anxiety and depression.  She was found to be in A. fib while hospitalized and was discharged home on Eliquis 5 mg twice daily, as well as aspirin 81 mg daily.  She continues to take these medications, she denies any overt bleeding.  Her symptoms have resolved and she feels back to baseline.     2022-  Patient presents to clinic in follow-up for previous stroke.  She tells me she feels basically back to her baseline with no new neurologic symptoms.  She does tell me occasionally she will have problems writing with the first 2 fingers on her right hand which were the area of concern with her stroke, however, this only happens occasionally.  She had stopped her atorvastatin and Eliquis for a few days as she was having full body itching and hives.  She was working with cardiologist in trying  to determine the source of that, she tells me she has figured out that it was not medication related that it was related to mold in her house.  She did recently have her lipid panel rechecked.  There was a note from Dr. Cope's office that he was changing her to Xarelto, however that was when they were concerned that Eliquis could be causing her symptoms.  She is now taking Eliquis 5 mg twice daily, cardiology stopped her daily aspirin which I am okay with, and she continues atorvastatin 20 mg nightly for secondary stroke prevention.  Her LDL levels did drop from 155-125 over the last 2 months on 20 mg, patient is reluctant at this time to increase that any further and will continue to work with her primary care on getting her LDL levels lower.  I told her the goal would be less than 70.     11/28/2022-  Patient presents to clinic today accompanied by a friend.  She denies any new neurologic symptoms.  She feels she is at her baseline with no residual symptoms from her March left precentral gyrus infarct.  She continues to take Eliquis 5 mg twice daily, she denies any adverse effects she recently had lipid panel drawn by her primary care in October where her LDL was revealed to be down to 74.  She continues to take atorvastatin 20 mg nightly with no adverse effects    6/7/2023- patient reports that she has been doing well since her last visit.  She has no residual stroke symptoms.  She has been compliant with all medications.  She does report that she had elevated LFTs several months ago.  She was taken off of her atorvastatin at that time.  She followed up with Dr. Cope of cardiology who restarted her Lipitor.  We will recheck a CMP today.  In addition, patient states that she was diagnosed with PTSD.  She was prescribed Wellbutrin and reports it has been life-changing for her.  She has been sleeping well with no nightmares and her mood has improved significantly.  Subjective        I have reviewed and the  "following portions of the patient's history were updated as appropriate: past family history, past medical history, past social history, past surgical history and problem list.    Medications:     Current Outpatient Medications:     acetaminophen (TYLENOL) 500 MG tablet, Take 250 mg by mouth Every 6 (Six) Hours As Needed for Mild Pain., Disp: , Rfl:     atorvastatin (LIPITOR) 20 MG tablet, TAKE 1 TABLET BY MOUTH DAILY, Disp: 90 tablet, Rfl: 3    buPROPion XL (WELLBUTRIN XL) 150 MG 24 hr tablet, Take 1 tablet by mouth Daily., Disp: , Rfl:     Calcium Citrate (CITRACAL PO), Take 2 tablets by mouth Daily. + Magnesium, Disp: , Rfl:     Eliquis 5 MG tablet tablet, TAKE 1 TABLET BY MOUTH EVERY 12 HOURS FOR ATRIAL FIBRILLATION, Disp: 60 tablet, Rfl: 3    estradiol (ESTRACE VAGINAL) 0.1 MG/GM vaginal cream, Massage pea size amount into vaginal opening twice weekly, Disp: 42.5 g, Rfl: 1    ipratropium (ATROVENT) 0.06 % nasal spray, 1 spray into the nostril(s) as directed by provider As Needed., Disp: , Rfl:     levothyroxine (SYNTHROID, LEVOTHROID) 50 MCG tablet, Take 1 tablet by mouth Daily., Disp: , Rfl:     Misc Natural Products (OSTEO BI-FLEX TRIPLE STRENGTH PO), Take 1 tablet by mouth 2 (Two) Times a Day., Disp: , Rfl:     PARoxetine (PAXIL) 10 MG tablet, Take 1 tablet by mouth Every Morning., Disp: , Rfl:     ZINC SULFATE PO, Take 1 tablet by mouth Daily., Disp: , Rfl:     metoprolol tartrate (LOPRESSOR) 25 MG tablet, Take 0.25 tablets by mouth As Needed (Sustained HR > 130 over 1 hour). Patient is to take 6.25 mg every 4-6 hours prn for sustained HR > 130 over 1 hour- Please divide tablet if possible prior to patient picking up. (Patient not taking: Reported on 6/8/2023), Disp: 10 tablet, Rfl: 0    Allergies:   No Known Allergies    Objective     Physical Exam:   Vital Signs:   Vitals:    06/08/23 1449   BP: 126/66   Pulse: 78   Temp: 98.3 °F (36.8 °C)   SpO2: 98%   Weight: 70.3 kg (155 lb)   Height: 160 cm (62.99\") "     Body mass index is 27.46 kg/m².    Physical Exam  Constitutional:       General: She is not in acute distress.  HENT:      Head: Normocephalic and atraumatic.   Eyes:      Extraocular Movements: Extraocular movements intact.      Pupils: Pupils are equal, round, and reactive to light.   Cardiovascular:      Rate and Rhythm: Normal rate and regular rhythm.   Pulmonary:      Effort: Pulmonary effort is normal. No respiratory distress.   Abdominal:      General: There is no distension.      Palpations: Abdomen is soft.   Musculoskeletal:         General: Normal range of motion.      Cervical back: Normal range of motion and neck supple.   Skin:     General: Skin is warm and dry.      Capillary Refill: Capillary refill takes less than 2 seconds.   Neurological:      General: No focal deficit present.      Mental Status: She is alert and oriented to person, place, and time. Mental status is at baseline.      Cranial Nerves: No cranial nerve deficit.      Sensory: No sensory deficit.      Motor: No weakness.      Coordination: Coordination normal.      Gait: Gait normal.   Psychiatric:         Mood and Affect: Mood normal.         Behavior: Behavior normal.           NIH Stroke Scale  Person Administering Scale: GILBERT Oliveros    1a  Level of consciousness: 0=alert; keenly responsive   1b. LOC questions:  0=Answers both questions correctly   1c. LOC commands: 0=Performs both tasks correctly   2.  Best Gaze: 0=normal   3.  Visual: 0=No visual loss   4. Facial Palsy: 0=Normal symmetric movement   5a.  Motor left arm: 0=No drift, limb holds 90 (or 45) degrees for full 10 seconds   5b.  Motor right arm: 0=No drift, limb holds 90 (or 45) degrees for full 10 seconds   6a. motor left le=No drift, limb holds 90 (or 45) degrees for full 10 seconds   6b  Motor right le=No drift, limb holds 90 (or 45) degrees for full 10 seconds   7. Limb Ataxia: 0=Absent   8.  Sensory: 0=Normal; no sensory loss   9. Best  Language:  0=No aphasia, normal   10. Dysarthria: 0=Normal   11. Extinction and Inattention: 0=No abnormality    Total:   0       MODIFIED RUBIN SCALE (to be assessed for each patient having history of stroke) []Stroke history but not assessed  [x]0: No symptoms at all  []1: No significant disability despite symptoms  []2: Slight disability  []3: Moderate disability  []4: Moderately severe disability  []5: Severe disability  []6: Death     Lab Results   Component Value Date    GLUCOSE 96 05/07/2022    BUN 11 05/07/2022    CREATININE 0.73 05/07/2022    EGFR 84.8 05/07/2022    BCR 15.1 05/07/2022    K 4.0 05/07/2022    CO2 23.5 05/07/2022    CALCIUM 9.7 05/07/2022    ALBUMIN 4.00 05/07/2022    BILITOT 0.4 05/07/2022    AST 17 05/07/2022    ALT 12 05/07/2022             Assessment / Plan      Assessment/Plan:   Diagnoses and all orders for this visit:    1. History of stroke (Primary)  - Continue Eliquis 5 mg twice daily  - Continue atorvastatin 20 mg daily  - BP goals less than 130/80  - Heart healthy diet  - Increase activity as tolerated  - Return to the ED with any additional stroke symptoms    2. Primary hypertension  - BP goals less than 130/80  - Management per PCP    3. Paroxysmal atrial fibrillation  - Continue Eliquis 5 mg twice daily  - Rate and rhythm control per cardiology    4. Hyperlipidemia LDL goal <70  - Continue atorvastatin 20 mg daily  - Recent history of elevated LFTs per patient report.  Atorvastatin was stopped by her PCP and then restarted by Dr. Cope in cardiology.  - Recheck CMP  - Heart healthy diet  - Further management per primary care     Discussed the importance of medication compliance Eliquis 5mg twice daily, atorvastatin 20 mg daily and lifestyle modifications adequate control of blood pressure, adequate control of cholesterol (goal LDL <70), increased physical activity, and implementation of healthy diet to help reduce the risk of future cerebrovascular events.  Also discussed the  signs symptoms that would warrant the patient return back to the emergency department including unilateral weakness, unilateral numbness, visual disturbances, loss of balance, speech difficulties, and/or a sudden severe headache.  Patient verbalized understanding.    Follow Up:   Return in about 6 months (around 12/8/2023).    GILBERT Oliveros  [facility] [place] [facility]

## 2023-06-07 NOTE — PATIENT INSTRUCTIONS
Continue Eliquis 5 mg twice daily  Continue atorvastatin 20 mg daily  BP goals less than 130/80  Recheck LFTs  Heart healthy diet  Increase activity as tolerated  Return to the ED with any additional stroke symptoms

## 2023-06-08 ENCOUNTER — LAB (OUTPATIENT)
Dept: LAB | Facility: HOSPITAL | Age: 78
End: 2023-06-08
Payer: MEDICARE

## 2023-06-08 ENCOUNTER — OFFICE VISIT (OUTPATIENT)
Dept: NEUROLOGY | Facility: CLINIC | Age: 78
End: 2023-06-08
Payer: MEDICARE

## 2023-06-08 VITALS
WEIGHT: 155 LBS | HEART RATE: 78 BPM | SYSTOLIC BLOOD PRESSURE: 126 MMHG | BODY MASS INDEX: 27.46 KG/M2 | DIASTOLIC BLOOD PRESSURE: 66 MMHG | HEIGHT: 63 IN | OXYGEN SATURATION: 98 % | TEMPERATURE: 98.3 F

## 2023-06-08 DIAGNOSIS — Z86.73 HISTORY OF STROKE: Primary | ICD-10-CM

## 2023-06-08 DIAGNOSIS — Z86.73 HISTORY OF STROKE: ICD-10-CM

## 2023-06-08 DIAGNOSIS — R79.89 ELEVATED LFTS: ICD-10-CM

## 2023-06-08 DIAGNOSIS — I10 PRIMARY HYPERTENSION: ICD-10-CM

## 2023-06-08 DIAGNOSIS — I48.0 PAROXYSMAL ATRIAL FIBRILLATION: ICD-10-CM

## 2023-06-08 DIAGNOSIS — E78.5 HYPERLIPIDEMIA LDL GOAL <70: ICD-10-CM

## 2023-06-08 LAB
ALBUMIN SERPL-MCNC: 4.2 G/DL (ref 3.5–5.2)
ALBUMIN/GLOB SERPL: 1.7 G/DL
ALP SERPL-CCNC: 109 U/L (ref 39–117)
ALT SERPL W P-5'-P-CCNC: 18 U/L (ref 1–33)
ANION GAP SERPL CALCULATED.3IONS-SCNC: 10.7 MMOL/L (ref 5–15)
AST SERPL-CCNC: 17 U/L (ref 1–32)
BILIRUB SERPL-MCNC: 0.4 MG/DL (ref 0–1.2)
BUN SERPL-MCNC: 15 MG/DL (ref 8–23)
BUN/CREAT SERPL: 16.3 (ref 7–25)
CALCIUM SPEC-SCNC: 9.4 MG/DL (ref 8.6–10.5)
CHLORIDE SERPL-SCNC: 103 MMOL/L (ref 98–107)
CO2 SERPL-SCNC: 25.3 MMOL/L (ref 22–29)
CREAT SERPL-MCNC: 0.92 MG/DL (ref 0.57–1)
EGFRCR SERPLBLD CKD-EPI 2021: 63.9 ML/MIN/1.73
GLOBULIN UR ELPH-MCNC: 2.5 GM/DL
GLUCOSE SERPL-MCNC: 87 MG/DL (ref 65–99)
POTASSIUM SERPL-SCNC: 3.9 MMOL/L (ref 3.5–5.2)
PROT SERPL-MCNC: 6.7 G/DL (ref 6–8.5)
SODIUM SERPL-SCNC: 139 MMOL/L (ref 136–145)

## 2023-06-08 PROCEDURE — 36415 COLL VENOUS BLD VENIPUNCTURE: CPT

## 2023-06-08 PROCEDURE — 80053 COMPREHEN METABOLIC PANEL: CPT

## 2023-06-12 ENCOUNTER — TELEPHONE (OUTPATIENT)
Dept: NEUROLOGY | Facility: CLINIC | Age: 78
End: 2023-06-12
Payer: MEDICARE

## 2023-06-12 NOTE — TELEPHONE ENCOUNTER
Spoke to  and relayed the message from our provider. She thanked me for calling and had no further questions.

## 2023-06-12 NOTE — TELEPHONE ENCOUNTER
----- Message from GILBERT Sheikh sent at 6/12/2023 12:07 PM EDT -----  Can you let Mrs. Banuelos know that her liver enzymes are within range.  She is okay to continue taking her atorvastatin daily.  Thank you

## 2023-07-18 ENCOUNTER — OFFICE VISIT (OUTPATIENT)
Dept: ENDOCRINOLOGY | Facility: CLINIC | Age: 78
End: 2023-07-18
Payer: MEDICARE

## 2023-07-18 VITALS
WEIGHT: 155 LBS | DIASTOLIC BLOOD PRESSURE: 70 MMHG | HEIGHT: 63 IN | SYSTOLIC BLOOD PRESSURE: 126 MMHG | HEART RATE: 66 BPM | OXYGEN SATURATION: 98 % | BODY MASS INDEX: 27.46 KG/M2

## 2023-07-18 DIAGNOSIS — E04.2 MULTIPLE THYROID NODULES: Primary | ICD-10-CM

## 2023-07-18 PROCEDURE — 3078F DIAST BP <80 MM HG: CPT | Performed by: INTERNAL MEDICINE

## 2023-07-18 PROCEDURE — 3074F SYST BP LT 130 MM HG: CPT | Performed by: INTERNAL MEDICINE

## 2023-07-18 PROCEDURE — 76536 US EXAM OF HEAD AND NECK: CPT | Performed by: INTERNAL MEDICINE

## 2023-07-18 PROCEDURE — 99213 OFFICE O/P EST LOW 20 MIN: CPT | Performed by: INTERNAL MEDICINE

## 2023-07-18 NOTE — LETTER
"July 29, 2023     GILBERT Bolivar  [address] [place] [id number]    Patient: Divine Banuelos   YOB: 1945   Date of Visit: 7/18/2023     Dear GILBERT Bolivar:     I had the pleasure of seeing your patient, Divine Banuelos.   Below are the relevant portions of my assessment and plan of care.    If you have questions, please do not hesitate to call me.        Sincerely,        Diandra Elliott MD        CC: No Recipients    Diandra Elliott MD  07/29/23 [id number]  Sign when Signing Visit  Chief Complaint   Patient presents with   • Thyroid Nodule      Follow up       HPI:   Divine Banuelos is a 77 y.o.female who returns to Endocrine Clinic for f/u evaluation of her thyroid nodules. Last visit 07/20/2022.  Her history is as follows:    Interim Events:   - no new health issues. Had another right breast lumpectomy in 08/2022 - no malignancy    1) multiple thyroid nodules:  -Patient recalls having a thyroid ultrasound first completed in 12/2017 at [facility] after presenting to her PCP with a small, firm palpable mass in the left upper neck. Thyroid nodules were described and she had serial Thyroid US's in 2019 and 2021.     (12/14/2017, [facility]) Thyroid US: \"The right lobe measures 4.0 x 1.2 x 1.5 cm. The left lobe measures 3.6 x 1.2 x 1.4 cm. The isthmus measures 3 mm.   Right lobe nodules: The right lobe is heterogeneous in its echotexture. Solid nodule superior pole oval well-circumscribed   mildly hypoechoic 10 x 8 x 10 mm. Solid nodule well-circumscribed nearly isoechoic with the gland measures 7 x 5 x 7 mm midportion. Solid nodule nearly isoechoic with the gland well-circumscribed inferior pole measures 11 x 7 x 6 mm.   Left lobe nodules: The gland is heterogeneous in its echotexture. Solid well-circumscribed mildly hypoechoic nodule midportion of gland 9 x 7 x 7 mm.  Impression: Bilateral thyroid nodules indeterminate. Follow-up in one year recommended.\"    (09/12/2019, [facility]) " "Thyroid US: \"The right lobe measures 3.9 x 1.1 x 1.5 cm. Left lobe measures 3.8 x 1.1 x 1.3 cm.   Right lobe thyroid nodules: Multiple hypoechoic nodules are identified, the upper pole 7 x 5 x 6 mm. Upper pole 8.4 x 6 mm. Mid pole 4 x 6 x 4 mm. Distal 4.5 x 5 x 3 mm. Lower pole dominant lesion 1.3 x 1 0.8 cm   In the left lobe, midpole measuring 11 x 8 x 6 mm   Remainder the left lobe is unremarkable. Isthmus: Normal   When compared to the prior study, there is a slight increase in number of the right lobe thyroid nodules, remaining hypoechoic without suspicious calcification or color duplex abnormality. The dominant lesion in the inferior pole the right demonstrates some cystic degeneration with no significant appreciable increase in size.  Impression: 1. Multinodular goiter, right greater than left. 2. Annual follow-up surveillance is recommended.\"    (04/09/2021, [facility]) Thyroid US: \"The right lobe measures 3.8 x 1.1 x 1.3 cm. The left lobe measures 2.5 x 1.0 x 1.1 cm. The isthmus measures 3 mm in anteroposterior dimension. The thyroid gland appears small in size and diffusely heterogeneous in echogenicity with increased vascularity.  Right thyroid nodule(s):   *  6 x 5 x 5 mm solid hypoechoic nodule in the upper lobe, previously 7 x 5 x 6 mm   *  10 x 9 x 8 mm solid heterogeneous isoechoic nodule in the inferior lobe, previously 13 x 9 x 8 mm   *  Other smaller nodules in the right lobe are again noted   Left thyroid nodule(s):   *  9 x 7 x 7 mm solid hypoechoic nodule in the mid lobe, previously 11 x 8 x 6 mm   Isthmus nodule(s):   *  No  Impression:   Small heterogeneous and hyperemic thyroid gland, consistent with thyroiditis. Small bilateral thyroid nodules, similar to mildly decreased from the prior study.\"    Initial endocrine visit: (7/19/2021)  (7/19/2021) thyroid ultrasound: Thyroid ultrasound completed in clinic showed the thyroid gland was normal in size by measured dimensions.   - In the right " superior lobe, a 0.64(L) x 0.52(AP) x 0.57(T) cm slightly hypoechoic, solid nodule was identified. The nodule had a smooth margin, no vascularity, and no microcalcifications.   - In the right mid lobe, a 0.93(L) x 0.76(AP) x 0.90(T) cm isoechoic, solid nodule was identified. The nodule had a smooth margin, peripheral and intranodal vascularity, and no microcalcifications.   - In the left mid lobe, a 1.08(L) x 0.61(AP) x 0.85(T) cm slightly hypoechoic, solid nodule was identified. The nodule had a smooth margin, no vascularity, and no microcalcifications.   - The nodules identified lacked suspicious US characteristics and did not meet criteria for FNA.  - A small blood vessel with a calcified wall was identified in the left upper neck in the area of the small palpable mass on physical exam.  RECOMMENDATIONS: Repeat thyroid US recommended to the patient in one year.    (07/20/2022) Thyroid US: Thyroid ultrasound completed in clinic showed the thyroid gland was normal in size by measured dimensions.   - In the right superior lobe, a 0.72(L) x 0.47(AP) x 0.57(T) cm slightly hypoechoic, solid nodule was again identified. The nodule had a smooth margin, no vascularity, and no microcalcifications.   - In the right mid lobe, a 0.93(L) x 0.50(AP) x 0.53(T) cm isoechoic, solid nodule was again identified. The nodule had a smooth margin, peripheral and intranodal vascularity, and no microcalcifications.   - In the right inferior lobe, a 0.91(L) x 0.76(AP) x 0.83(T) cm slightly hypoechoic, solid nodule was again identified. The nodule had a smooth margin, no vascularity, and no microcalcifications.   - In the left mid lobe, a 1.05(L) x 0.62(AP) x 0.76(T) cm slightly hypoechoic, solid nodule was again identified. The nodule had a smooth margin, no vascularity, and no microcalcifications.   - The nodules identified lacked suspicious US characteristics and did not meet criteria for FNA.  - A small blood vessel with a calcified  "wall was identified in the left upper neck in the area of the small palpable mass on physical exam.  RECOMMENDATIONS: Repeat thyroid US recommended to the patient in one year.    FMH: no known thyroid cancer  PMH: no h/o irradiation of head, neck, or chest      2) hypothyroidism due to Hashimoto's thyroiditis:  - diagnosed in 2014 (TSH was 6.66). Was prescribed levothyroxine 25 mcg daily at time of diagnosis.   - In 04/2021, dose was increased to 50 mcg daily for TSH of 5.16.    Current Dose: Levothyroxine 50 mcg   Takes tablet in a.m. on an empty stomach. Waits 30 to 45 minutes before food or hot liquids.  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 Is followed by neurology. No residual deficits.   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    Eyes:  Positive for redness.   Respiratory: Negative.     Cardiovascular: Negative.    Gastrointestinal: Negative.    Endocrine: Negative.    Genitourinary: Negative.    Musculoskeletal:  Positive for arthralgias, back pain, neck pain and neck stiffness.   Skin: Negative.    Allergic/Immunologic: Positive for environmental allergies.   Neurological: Negative.  Negative for weakness.   Hematological: Negative.    Psychiatric/Behavioral: Negative.       The following portions of the patient's history were reviewed and updated as appropriate: allergies, current medications, past family history, past medical history, past social history, past surgical history and problem list.      /70   Pulse 66   Ht 160 cm (63\")   Wt 70.3 kg (155 lb)   LMP  (LMP Unknown)   SpO2 98%   BMI 27.46 kg/m²   Physical Exam  Vitals reviewed.   Constitutional:       General: " She is not in acute distress.     Appearance: She is well-developed. She is not diaphoretic.   HENT:      Head: Normocephalic.   Eyes:      Conjunctiva/sclera: Conjunctivae normal.      Pupils: Pupils are equal, round, and reactive to light.   Neck:      Thyroid: No thyromegaly.      Trachea: No tracheal deviation.      Comments: No palpable thyroid nodules    Cardiovascular:      Rate and Rhythm: Normal rate and regular rhythm.      Heart sounds: Normal heart sounds. No murmur heard.  Pulmonary:      Effort: Pulmonary effort is normal. No respiratory distress.      Breath sounds: Normal breath sounds.   Lymphadenopathy:      Cervical: No cervical adenopathy.   Skin:     General: Skin is warm and dry.      Findings: No erythema.   Neurological:      Mental Status: She is alert and oriented to person, place, and time.      Cranial Nerves: No cranial nerve deficit.   Psychiatric:         Behavior: Behavior normal.     LABS/IMAGING: outside records reviewed and summarized in \[facility]\""  Lab Results   Component Value Date    TSH 3.19 03/01/2023     PROCEDURES (in office):  Thyroid Ultrasound Report  [facility]  [place], [place]    Date: 07/18/2023  Indication: thyroid nodules  Comparison Imaging: Endocrine clinic thyroid ultrasound (07/2021), (07/2022)  Clinical History: 77 y.o. female with h/o hypothyroidism since 2014. Outside US report describing thyroid nodules. Pt with a small, firm palpable mass in the left upper neck consistent with calcified blood vessel.    Real time high resolution imaging of the thyroid gland was performed in transverse and longitudinal planes.  The right lobe measured 4.03 cm in Length x 1.18 cm in AP diameter x 1.52 cm in TV dimension.  The isthmus measured 0.23 cm in thickness.  The left thyroid lobe measured 2.99 cm in length x 1.12 cm in AP diameter x 1.43 cm in TV dimension.    The thyroid gland appeared overall isoechoic with heterogeneous echotexture.    In the right superior  lobe, a 0.62(L) x 0.44(AP) x 0.61(T) cm slightly hypoechoic, solid nodule was again identified. The nodule had a smooth margin, no vascularity, and no microcalcifications (TI-RADS Level 4)     In the right mid lobe, a 0.65(L) x 0.36(AP) x 0.63(T) cm slightly hypoechoic, spongiform nodule was again identified. The nodule had a smooth margin, peripheral vascularity, and no microcalcifications. (TI-RADS Level 2)    In the right inferior lobe, a 0.87(L) x 0.73(AP) x 0.80(T) cm isoechoic, solid nodule was again identified. The nodule had a smooth margin, no vascularity, and no microcalcifications. (TI-RADS Level 3)    In the left mid lobe, a 0.90(L) x 0.61(AP) x 0.76(T) cm slightly hypoechoic, solid nodule was again identified. The nodule had a smooth margin, no vascularity, and no microcalcifications. (TI-RADS Level 4).    There were no lesions or abnormal lymph nodes in the left upper neck in the area of the palpable mass on exam. A small blood vessel with a calcified wall was identified in the area of the palpable mass.    No pathologic lymph nodes were seen.     IMPRESSION:   1) The thyroid gland was normal in size by measured dimensions.   2) In the right superior lobe, a 0.62(L) x 0.44(AP) x 0.61(T) cm slightly hypoechoic, solid nodule was again identified. The nodule had a smooth margin, no vascularity, and no microcalcifications (TI-RADS Level 4)   3) In the right mid lobe, a 0.65(L) x 0.36(AP) x 0.63(T) cm slightly hypoechoic, spongiform nodule was again identified. The nodule had a smooth margin, peripheral vascularity, and no microcalcifications. (TI-RADS Level 2)  4) In the right inferior lobe, a 0.87(L) x 0.73(AP) x 0.80(T) cm isoechoic, solid nodule was again identified. The nodule had a smooth margin, no vascularity, and no microcalcifications. (TI-RADS Level 3)  5) In the left mid lobe, a 0.90(L) x 0.61(AP) x 0.76(T) cm slightly hypoechoic, solid nodule was again identified. The nodule had a smooth  margin, no vascularity, and no microcalcifications. (TI-RADS Level 4).  6) The nodules identified lacked highly suspicious US characteristics and did not meet criteria for FNA. Of note, the nodules appeared smaller in size in comparison to prior imaging.  7) A small blood vessel with a calcified wall was identified in the left upper neck in the area of the small palpable mass on physical exam.    RECOMMENDATIONS: Repeat Thyroid US recommended if changes in the gland / neck are noted on physical exam.    Signed: Diandra Elliott MD    ASSESSMENT/PLAN:  1) multiple thyroid nodules: Thyroid ultrasound completed in clinic today showed -   - The thyroid gland was normal in size by measured dimensions.   - In the right superior lobe, a 0.62(L) x 0.44(AP) x 0.61(T) cm slightly hypoechoic, solid nodule was again identified. The nodule had a smooth margin, no vascularity, and no microcalcifications (TI-RADS Level 4)   - In the right mid lobe, a 0.65(L) x 0.36(AP) x 0.63(T) cm slightly hypoechoic, spongiform nodule was again identified. The nodule had a smooth margin, peripheral vascularity, and no microcalcifications. (TI-RADS Level 2)  - In the right inferior lobe, a 0.87(L) x 0.73(AP) x 0.80(T) cm isoechoic, solid nodule was again identified. The nodule had a smooth margin, no vascularity, and no microcalcifications. (TI-RADS Level 3)  - In the left mid lobe, a 0.90(L) x 0.61(AP) x 0.76(T) cm slightly hypoechoic, solid nodule was again identified. The nodule had a smooth margin, no vascularity, and no microcalcifications. (TI-RADS Level 4).  - The nodules identified lacked highly suspicious US characteristics and did not meet criteria for FNA. Of note, the nodules appeared smaller in size in comparison to prior imaging.  - A small blood vessel with a calcified wall was identified in the left upper neck in the area of the small palpable mass on physical exam.    RECOMMENDATIONS: Repeat Thyroid US recommended if changes in the  gland / neck are noted on physical exam.  Reviewed ultrasound images with patient.       2) hypothyroidism due to Hashimoto's thyroiditis:  - Clinically euthyroid on exam  -Recent TSH within normal limits.  - Continue levothyroxine 50 mcg daily  - Reviewed proper thyroid hormone administration, and factors to avoid that decrease medication potency and medication absorption.     RTC as needed     Signed: MD Earl Mock Megha, MD  07/29/23 [id number]  Sign when Signing Visit  Thyroid Ultrasound Report  [facility]  [place], [place]    Date: 07/18/2023  Indication: thyroid nodules  Comparison Imaging: Endocrine clinic thyroid ultrasound (07/2021), (07/2022)  Clinical History: 77 y.o. female with h/o hypothyroidism since 2014. Outside US report describing thyroid nodules. Pt with a small, firm palpable mass in the left upper neck consistent with calcified blood vessel.    Real time high resolution imaging of the thyroid gland was performed in transverse and longitudinal planes.  The right lobe measured 4.03 cm in Length x 1.18 cm in AP diameter x 1.52 cm in TV dimension.  The isthmus measured 0.23 cm in thickness.  The left thyroid lobe measured 2.99 cm in length x 1.12 cm in AP diameter x 1.43 cm in TV dimension.    The thyroid gland appeared overall isoechoic with heterogeneous echotexture.    In the right superior lobe, a 0.62(L) x 0.44(AP) x 0.61(T) cm slightly hypoechoic, solid nodule was again identified. The nodule had a smooth margin, no vascularity, and no microcalcifications (TI-RADS Level 4)     In the right mid lobe, a 0.65(L) x 0.36(AP) x 0.63(T) cm slightly hypoechoic, spongiform nodule was again identified. The nodule had a smooth margin, peripheral vascularity, and no microcalcifications. (TI-RADS Level 2)    In the right inferior lobe, a 0.87(L) x 0.73(AP) x 0.80(T) cm isoechoic, solid nodule was again identified. The nodule had a smooth margin, no vascularity, and no  microcalcifications. (TI-RADS Level 3)    In the left mid lobe, a 0.90(L) x 0.61(AP) x 0.76(T) cm slightly hypoechoic, solid nodule was again identified. The nodule had a smooth margin, no vascularity, and no microcalcifications. (TI-RADS Level 4).    There were no lesions or abnormal lymph nodes in the left upper neck in the area of the palpable mass on exam. A small blood vessel with a calcified wall was identified in the area of the palpable mass.    No pathologic lymph nodes were seen.     IMPRESSION:   1) The thyroid gland was normal in size by measured dimensions.   2) In the right superior lobe, a 0.62(L) x 0.44(AP) x 0.61(T) cm slightly hypoechoic, solid nodule was again identified. The nodule had a smooth margin, no vascularity, and no microcalcifications (TI-RADS Level 4)   3) In the right mid lobe, a 0.65(L) x 0.36(AP) x 0.63(T) cm slightly hypoechoic, spongiform nodule was again identified. The nodule had a smooth margin, peripheral vascularity, and no microcalcifications. (TI-RADS Level 2)  4) In the right inferior lobe, a 0.87(L) x 0.73(AP) x 0.80(T) cm isoechoic, solid nodule was again identified. The nodule had a smooth margin, no vascularity, and no microcalcifications. (TI-RADS Level 3)  5) In the left mid lobe, a 0.90(L) x 0.61(AP) x 0.76(T) cm slightly hypoechoic, solid nodule was again identified. The nodule had a smooth margin, no vascularity, and no microcalcifications. (TI-RADS Level 4).  6) The nodules identified lacked highly suspicious US characteristics and did not meet criteria for FNA. Of note, the nodules appeared smaller in size in comparison to prior imaging.  7) A small blood vessel with a calcified wall was identified in the left upper neck in the area of the small palpable mass on physical exam.    RECOMMENDATIONS: Repeat Thyroid US recommended if changes in the gland / neck are noted on physical exam.    Signed: Diandra Elliott MD

## 2023-07-18 NOTE — PROGRESS NOTES
Thyroid Ultrasound Report  [facility]  [place], [place]    Date: 07/18/2023  Indication: thyroid nodules  Comparison Imaging: Endocrine clinic thyroid ultrasound (07/2021), (07/2022)  Clinical History: 77 y.o. female with h/o hypothyroidism since 2014. Outside US report describing thyroid nodules. Pt with a small, firm palpable mass in the left upper neck consistent with calcified blood vessel.    Real time high resolution imaging of the thyroid gland was performed in transverse and longitudinal planes.  The right lobe measured 4.03 cm in Length x 1.18 cm in AP diameter x 1.52 cm in TV dimension.  The isthmus measured 0.23 cm in thickness.  The left thyroid lobe measured 2.99 cm in length x 1.12 cm in AP diameter x 1.43 cm in TV dimension.    The thyroid gland appeared overall isoechoic with heterogeneous echotexture.    In the right superior lobe, a 0.62(L) x 0.44(AP) x 0.61(T) cm slightly hypoechoic, solid nodule was again identified. The nodule had a smooth margin, no vascularity, and no microcalcifications (TI-RADS Level 4)     In the right mid lobe, a 0.65(L) x 0.36(AP) x 0.63(T) cm slightly hypoechoic, spongiform nodule was again identified. The nodule had a smooth margin, peripheral vascularity, and no microcalcifications. (TI-RADS Level 2)    In the right inferior lobe, a 0.87(L) x 0.73(AP) x 0.80(T) cm isoechoic, solid nodule was again identified. The nodule had a smooth margin, no vascularity, and no microcalcifications. (TI-RADS Level 3)    In the left mid lobe, a 0.90(L) x 0.61(AP) x 0.76(T) cm slightly hypoechoic, solid nodule was again identified. The nodule had a smooth margin, no vascularity, and no microcalcifications. (TI-RADS Level 4).    There were no lesions or abnormal lymph nodes in the left upper neck in the area of the palpable mass on exam. A small blood vessel with a calcified wall was identified in the area of the palpable mass.    No pathologic lymph nodes were seen.      IMPRESSION:   1) The thyroid gland was normal in size by measured dimensions.   2) In the right superior lobe, a 0.62(L) x 0.44(AP) x 0.61(T) cm slightly hypoechoic, solid nodule was again identified. The nodule had a smooth margin, no vascularity, and no microcalcifications (TI-RADS Level 4)   3) In the right mid lobe, a 0.65(L) x 0.36(AP) x 0.63(T) cm slightly hypoechoic, spongiform nodule was again identified. The nodule had a smooth margin, peripheral vascularity, and no microcalcifications. (TI-RADS Level 2)  4) In the right inferior lobe, a 0.87(L) x 0.73(AP) x 0.80(T) cm isoechoic, solid nodule was again identified. The nodule had a smooth margin, no vascularity, and no microcalcifications. (TI-RADS Level 3)  5) In the left mid lobe, a 0.90(L) x 0.61(AP) x 0.76(T) cm slightly hypoechoic, solid nodule was again identified. The nodule had a smooth margin, no vascularity, and no microcalcifications. (TI-RADS Level 4).  6) The nodules identified lacked highly suspicious US characteristics and did not meet criteria for FNA. Of note, the nodules appeared smaller in size in comparison to prior imaging.  7) A small blood vessel with a calcified wall was identified in the left upper neck in the area of the small palpable mass on physical exam.    RECOMMENDATIONS: Repeat Thyroid US recommended if changes in the gland / neck are noted on physical exam.    Signed: Diandra Elliott MD

## 2023-07-18 NOTE — PROGRESS NOTES
"Chief Complaint   Patient presents with    Thyroid Nodule      Follow up       HPI:   Divine Banuelos is a 77 y.o.female who returns to Endocrine Clinic for f/u evaluation of her thyroid nodules. Last visit 07/20/2022.  Her history is as follows:    Interim Events:   - no new health issues. Had another right breast lumpectomy in 08/2022 - no malignancy    1) multiple thyroid nodules:  -Patient recalls having a thyroid ultrasound first completed in 12/2017 at [facility] after presenting to her PCP with a small, firm palpable mass in the left upper neck. Thyroid nodules were described and she had serial Thyroid US's in 2019 and 2021.     (12/14/2017, [facility]) Thyroid US: \"The right lobe measures 4.0 x 1.2 x 1.5 cm. The left lobe measures 3.6 x 1.2 x 1.4 cm. The isthmus measures 3 mm.   Right lobe nodules: The right lobe is heterogeneous in its echotexture. Solid nodule superior pole oval well-circumscribed   mildly hypoechoic 10 x 8 x 10 mm. Solid nodule well-circumscribed nearly isoechoic with the gland measures 7 x 5 x 7 mm midportion. Solid nodule nearly isoechoic with the gland well-circumscribed inferior pole measures 11 x 7 x 6 mm.   Left lobe nodules: The gland is heterogeneous in its echotexture. Solid well-circumscribed mildly hypoechoic nodule midportion of gland 9 x 7 x 7 mm.  Impression: Bilateral thyroid nodules indeterminate. Follow-up in one year recommended.\"    (09/12/2019, [facility]) Thyroid US: \"The right lobe measures 3.9 x 1.1 x 1.5 cm. Left lobe measures 3.8 x 1.1 x 1.3 cm.   Right lobe thyroid nodules: Multiple hypoechoic nodules are identified, the upper pole 7 x 5 x 6 mm. Upper pole 8.4 x 6 mm. Mid pole 4 x 6 x 4 mm. Distal 4.5 x 5 x 3 mm. Lower pole dominant lesion 1.3 x 1 0.8 cm   In the left lobe, midpole measuring 11 x 8 x 6 mm   Remainder the left lobe is unremarkable. Isthmus: Normal   When compared to the prior study, there is a slight increase in number of the right lobe " "thyroid nodules, remaining hypoechoic without suspicious calcification or color duplex abnormality. The dominant lesion in the inferior pole the right demonstrates some cystic degeneration with no significant appreciable increase in size.  Impression: 1. Multinodular goiter, right greater than left. 2. Annual follow-up surveillance is recommended.\"    (04/09/2021, [facility]) Thyroid US: \"The right lobe measures 3.8 x 1.1 x 1.3 cm. The left lobe measures 2.5 x 1.0 x 1.1 cm. The isthmus measures 3 mm in anteroposterior dimension. The thyroid gland appears small in size and diffusely heterogeneous in echogenicity with increased vascularity.  Right thyroid nodule(s):   *  6 x 5 x 5 mm solid hypoechoic nodule in the upper lobe, previously 7 x 5 x 6 mm   *  10 x 9 x 8 mm solid heterogeneous isoechoic nodule in the inferior lobe, previously 13 x 9 x 8 mm   *  Other smaller nodules in the right lobe are again noted   Left thyroid nodule(s):   *  9 x 7 x 7 mm solid hypoechoic nodule in the mid lobe, previously 11 x 8 x 6 mm   Isthmus nodule(s):   *  No  Impression:   Small heterogeneous and hyperemic thyroid gland, consistent with thyroiditis. Small bilateral thyroid nodules, similar to mildly decreased from the prior study.\"    Initial endocrine visit: (7/19/2021)  (7/19/2021) thyroid ultrasound: Thyroid ultrasound completed in clinic showed the thyroid gland was normal in size by measured dimensions.   - In the right superior lobe, a 0.64(L) x 0.52(AP) x 0.57(T) cm slightly hypoechoic, solid nodule was identified. The nodule had a smooth margin, no vascularity, and no microcalcifications.   - In the right mid lobe, a 0.93(L) x 0.76(AP) x 0.90(T) cm isoechoic, solid nodule was identified. The nodule had a smooth margin, peripheral and intranodal vascularity, and no microcalcifications.   - In the left mid lobe, a 1.08(L) x 0.61(AP) x 0.85(T) cm slightly hypoechoic, solid nodule was identified. The nodule had a smooth " margin, no vascularity, and no microcalcifications.   - The nodules identified lacked suspicious US characteristics and did not meet criteria for FNA.  - A small blood vessel with a calcified wall was identified in the left upper neck in the area of the small palpable mass on physical exam.  RECOMMENDATIONS: Repeat thyroid US recommended to the patient in one year.    (07/20/2022) Thyroid US: Thyroid ultrasound completed in clinic showed the thyroid gland was normal in size by measured dimensions.   - In the right superior lobe, a 0.72(L) x 0.47(AP) x 0.57(T) cm slightly hypoechoic, solid nodule was again identified. The nodule had a smooth margin, no vascularity, and no microcalcifications.   - In the right mid lobe, a 0.93(L) x 0.50(AP) x 0.53(T) cm isoechoic, solid nodule was again identified. The nodule had a smooth margin, peripheral and intranodal vascularity, and no microcalcifications.   - In the right inferior lobe, a 0.91(L) x 0.76(AP) x 0.83(T) cm slightly hypoechoic, solid nodule was again identified. The nodule had a smooth margin, no vascularity, and no microcalcifications.   - In the left mid lobe, a 1.05(L) x 0.62(AP) x 0.76(T) cm slightly hypoechoic, solid nodule was again identified. The nodule had a smooth margin, no vascularity, and no microcalcifications.   - The nodules identified lacked suspicious US characteristics and did not meet criteria for FNA.  - A small blood vessel with a calcified wall was identified in the left upper neck in the area of the small palpable mass on physical exam.  RECOMMENDATIONS: Repeat thyroid US recommended to the patient in one year.    FMH: no known thyroid cancer  PMH: no h/o irradiation of head, neck, or chest      2) hypothyroidism due to Hashimoto's thyroiditis:  - diagnosed in 2014 (TSH was 6.66). Was prescribed levothyroxine 25 mcg daily at time of diagnosis.   - In 04/2021, dose was increased to 50 mcg daily for TSH of 5.16.    Current Dose: Levothyroxine  "50 mcg   Takes tablet in a.m. on an empty stomach. Waits 30 to 45 minutes before food or hot liquids.  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 Is followed by neurology. No residual deficits.   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    Eyes:  Positive for redness.   Respiratory: Negative.     Cardiovascular: Negative.    Gastrointestinal: Negative.    Endocrine: Negative.    Genitourinary: Negative.    Musculoskeletal:  Positive for arthralgias, back pain, neck pain and neck stiffness.   Skin: Negative.    Allergic/Immunologic: Positive for environmental allergies.   Neurological: Negative.  Negative for weakness.   Hematological: Negative.    Psychiatric/Behavioral: Negative.       The following portions of the patient's history were reviewed and updated as appropriate: allergies, current medications, past family history, past medical history, past social history, past surgical history and problem list.      /70   Pulse 66   Ht 160 cm (63\")   Wt 70.3 kg (155 lb)   LMP  (LMP Unknown)   SpO2 98%   BMI 27.46 kg/m²   Physical Exam  Vitals reviewed.   Constitutional:       General: She is not in acute distress.     Appearance: She is well-developed. She is not diaphoretic.   HENT:      Head: Normocephalic.   Eyes:      Conjunctiva/sclera: Conjunctivae normal.      Pupils: Pupils are equal, round, and reactive to light.   Neck:      Thyroid: No thyromegaly.      Trachea: No tracheal deviation.      Comments: No palpable thyroid nodules    Cardiovascular:      Rate and Rhythm: Normal rate and regular rhythm.      Heart sounds: Normal heart sounds. No murmur heard.  Pulmonary:      " Effort: Pulmonary effort is normal. No respiratory distress.      Breath sounds: Normal breath sounds.   Lymphadenopathy:      Cervical: No cervical adenopathy.   Skin:     General: Skin is warm and dry.      Findings: No erythema.   Neurological:      Mental Status: She is alert and oriented to person, place, and time.      Cranial Nerves: No cranial nerve deficit.   Psychiatric:         Behavior: Behavior normal.     LABS/IMAGING: outside records reviewed and summarized in [facility]  Lab Results   Component Value Date    TSH 3.19 03/01/2023     PROCEDURES (in office):  Thyroid Ultrasound Report  [facility]  [place], [place]    Date: 07/18/2023  Indication: thyroid nodules  Comparison Imaging: Endocrine clinic thyroid ultrasound (07/2021), (07/2022)  Clinical History: 77 y.o. female with h/o hypothyroidism since 2014. Outside US report describing thyroid nodules. Pt with a small, firm palpable mass in the left upper neck consistent with calcified blood vessel.    Real time high resolution imaging of the thyroid gland was performed in transverse and longitudinal planes.  The right lobe measured 4.03 cm in Length x 1.18 cm in AP diameter x 1.52 cm in TV dimension.  The isthmus measured 0.23 cm in thickness.  The left thyroid lobe measured 2.99 cm in length x 1.12 cm in AP diameter x 1.43 cm in TV dimension.    The thyroid gland appeared overall isoechoic with heterogeneous echotexture.    In the right superior lobe, a 0.62(L) x 0.44(AP) x 0.61(T) cm slightly hypoechoic, solid nodule was again identified. The nodule had a smooth margin, no vascularity, and no microcalcifications (TI-RADS Level 4)     In the right mid lobe, a 0.65(L) x 0.36(AP) x 0.63(T) cm slightly hypoechoic, spongiform nodule was again identified. The nodule had a smooth margin, peripheral vascularity, and no microcalcifications. (TI-RADS Level 2)    In the right inferior lobe, a 0.87(L) x 0.73(AP) x 0.80(T) cm isoechoic, solid nodule was  again identified. The nodule had a smooth margin, no vascularity, and no microcalcifications. (TI-RADS Level 3)    In the left mid lobe, a 0.90(L) x 0.61(AP) x 0.76(T) cm slightly hypoechoic, solid nodule was again identified. The nodule had a smooth margin, no vascularity, and no microcalcifications. (TI-RADS Level 4).    There were no lesions or abnormal lymph nodes in the left upper neck in the area of the palpable mass on exam. A small blood vessel with a calcified wall was identified in the area of the palpable mass.    No pathologic lymph nodes were seen.     IMPRESSION:   1) The thyroid gland was normal in size by measured dimensions.   2) In the right superior lobe, a 0.62(L) x 0.44(AP) x 0.61(T) cm slightly hypoechoic, solid nodule was again identified. The nodule had a smooth margin, no vascularity, and no microcalcifications (TI-RADS Level 4)   3) In the right mid lobe, a 0.65(L) x 0.36(AP) x 0.63(T) cm slightly hypoechoic, spongiform nodule was again identified. The nodule had a smooth margin, peripheral vascularity, and no microcalcifications. (TI-RADS Level 2)  4) In the right inferior lobe, a 0.87(L) x 0.73(AP) x 0.80(T) cm isoechoic, solid nodule was again identified. The nodule had a smooth margin, no vascularity, and no microcalcifications. (TI-RADS Level 3)  5) In the left mid lobe, a 0.90(L) x 0.61(AP) x 0.76(T) cm slightly hypoechoic, solid nodule was again identified. The nodule had a smooth margin, no vascularity, and no microcalcifications. (TI-RADS Level 4).  6) The nodules identified lacked highly suspicious US characteristics and did not meet criteria for FNA. Of note, the nodules appeared smaller in size in comparison to prior imaging.  7) A small blood vessel with a calcified wall was identified in the left upper neck in the area of the small palpable mass on physical exam.    RECOMMENDATIONS: Repeat Thyroid US recommended if changes in the gland / neck are noted on physical  exam.    Signed: Diandra Elliott MD    ASSESSMENT/PLAN:  1) multiple thyroid nodules: Thyroid ultrasound completed in clinic today showed -   - The thyroid gland was normal in size by measured dimensions.   - In the right superior lobe, a 0.62(L) x 0.44(AP) x 0.61(T) cm slightly hypoechoic, solid nodule was again identified. The nodule had a smooth margin, no vascularity, and no microcalcifications (TI-RADS Level 4)   - In the right mid lobe, a 0.65(L) x 0.36(AP) x 0.63(T) cm slightly hypoechoic, spongiform nodule was again identified. The nodule had a smooth margin, peripheral vascularity, and no microcalcifications. (TI-RADS Level 2)  - In the right inferior lobe, a 0.87(L) x 0.73(AP) x 0.80(T) cm isoechoic, solid nodule was again identified. The nodule had a smooth margin, no vascularity, and no microcalcifications. (TI-RADS Level 3)  - In the left mid lobe, a 0.90(L) x 0.61(AP) x 0.76(T) cm slightly hypoechoic, solid nodule was again identified. The nodule had a smooth margin, no vascularity, and no microcalcifications. (TI-RADS Level 4).  - The nodules identified lacked highly suspicious US characteristics and did not meet criteria for FNA. Of note, the nodules appeared smaller in size in comparison to prior imaging.  - A small blood vessel with a calcified wall was identified in the left upper neck in the area of the small palpable mass on physical exam.    RECOMMENDATIONS: Repeat Thyroid US recommended if changes in the gland / neck are noted on physical exam.  Reviewed ultrasound images with patient.       2) hypothyroidism due to Hashimoto's thyroiditis:  - Clinically euthyroid on exam  -Recent TSH within normal limits.  - Continue levothyroxine 50 mcg daily  - Reviewed proper thyroid hormone administration, and factors to avoid that decrease medication potency and medication absorption.     RTC as needed     Signed: Diandra Elliott MD

## 2023-08-21 NOTE — PROGRESS NOTES
Patient: Divine Banuelos  YOB: 1945    Date: 08/23/2023    Primary Care Provider: Queenie Amezcua APRN    Chief Complaint   Patient presents with    Follow-up     right breast       History: The patient is in the office today for a follow up on her right breast.  She had a right breast lumpectomy in 2022.  Mammogram was normal in October 2022.  She states that she has some tenderness in right breast.  Ultrasound indicates no new lesions.  No recurrent cancer.    The following portions of the patient's history were reviewed and updated as appropriate: allergies, current medications, past family history, past medical history, past social history, past surgical history and problem list.    Review of Systems   Constitutional:  Negative for chills, fever and unexpected weight change.   HENT:  Negative for hearing loss, trouble swallowing and voice change.    Eyes:  Negative for visual disturbance.   Respiratory:  Negative for apnea, cough, chest tightness, shortness of breath and wheezing.    Cardiovascular:  Negative for chest pain, palpitations and leg swelling.   Gastrointestinal:  Negative for abdominal distention, abdominal pain, anal bleeding, blood in stool, constipation, diarrhea, nausea, rectal pain and vomiting.   Endocrine: Negative for cold intolerance and heat intolerance.   Genitourinary:  Negative for difficulty urinating, dysuria and flank pain.   Musculoskeletal:  Negative for back pain and gait problem.   Skin:  Negative for color change, rash and wound.   Neurological:  Negative for dizziness, syncope, speech difficulty, weakness, light-headedness, numbness and headaches.   Hematological:  Negative for adenopathy. Does not bruise/bleed easily.   Psychiatric/Behavioral:  Negative for confusion. The patient is not nervous/anxious.      Vital Signs  Vitals:    08/23/23 1000   BP: 120/70   Pulse: 94   Resp: 18   Temp: 98.2 øF (36.8 øC)   TempSrc: Temporal   SpO2: 98%   Weight: 70.7 kg (155  "lb 12.8 oz)   Height: 160 cm (63\")       Allergies:  No Known Allergies    Medications:    Current Outpatient Medications:     acetaminophen (TYLENOL) 500 MG tablet, Take 0.5 tablets by mouth Every 6 (Six) Hours As Needed for Mild Pain., Disp: , Rfl:     apixaban (Eliquis) 5 MG tablet tablet, Take 1 tablet by mouth Every 12 (Twelve) Hours., Disp: 60 tablet, Rfl: 11    atorvastatin (LIPITOR) 20 MG tablet, TAKE 1 TABLET BY MOUTH DAILY, Disp: 90 tablet, Rfl: 3    buPROPion XL (WELLBUTRIN XL) 150 MG 24 hr tablet, Take 1 tablet by mouth Daily., Disp: , Rfl:     Calcium Citrate (CITRACAL PO), Take 2 tablets by mouth Daily. + Magnesium, Disp: , Rfl:     estradiol (ESTRACE VAGINAL) 0.1 MG/GM vaginal cream, Massage pea size amount into vaginal opening twice weekly, Disp: 42.5 g, Rfl: 1    ipratropium (ATROVENT) 0.06 % nasal spray, 1 spray into the nostril(s) as directed by provider As Needed., Disp: , Rfl:     levothyroxine (SYNTHROID, LEVOTHROID) 50 MCG tablet, Take 1 tablet by mouth Daily., Disp: , Rfl:     metoprolol tartrate (LOPRESSOR) 25 MG tablet, Take 0.25 tablets by mouth As Needed (Sustained HR > 130 over 1 hour). Patient is to take 6.25 mg every 4-6 hours prn for sustained HR > 130 over 1 hour- Please divide tablet if possible prior to patient picking up., Disp: 10 tablet, Rfl: 0    Misc Natural Products (OSTEO BI-FLEX TRIPLE STRENGTH PO), Take 1 tablet by mouth 2 (Two) Times a Day., Disp: , Rfl:     PARoxetine (PAXIL) 10 MG tablet, Take 1 tablet by mouth Every Morning., Disp: , Rfl:     ZINC SULFATE PO, Take 1 tablet by mouth Daily., Disp: , Rfl:     Physical Exam:   General Appearance:    Alert, cooperative, in no acute distress   Head:    Normocephalic, without obvious abnormality, atraumatic   Lungs:     Clear to auscultation,respirations regular, even and                  unlabored    Heart:    Regular rhythm and normal rate, normal S1 and S2, no            murmur, no gallop, no rub, no click  Breast-no " palpable mass in either breast or axilla.   Abdomen:     Normal bowel sounds, no masses, no organomegaly, soft        non-tender, non-distended, no guarding, no rebound                tenderness   Extremities:   Moves all extremities well, no edema, no cyanosis, no             redness   Pulses:   Pulses palpable and equal bilaterally   Skin:   No bleeding, bruising or rash     Results Review:   I reviewed the patient's new clinical results.     Review of Systems was reviewed and confirmed as accurate as documented by the MA.    ASSESSMENT/PLAN:    1. History of breast cancer       Ultrasound and mammogram are negative.  No change in lumpectomy site.  Patient reassured to follow-up in 1 year.    Electronically signed by Stuart Caceres MD  08/23/23

## 2023-08-23 ENCOUNTER — OFFICE VISIT (OUTPATIENT)
Dept: SURGERY | Facility: CLINIC | Age: 78
End: 2023-08-23
Payer: MEDICARE

## 2023-08-23 VITALS
HEIGHT: 63 IN | BODY MASS INDEX: 27.61 KG/M2 | SYSTOLIC BLOOD PRESSURE: 120 MMHG | HEART RATE: 94 BPM | DIASTOLIC BLOOD PRESSURE: 70 MMHG | TEMPERATURE: 98.2 F | WEIGHT: 155.8 LBS | RESPIRATION RATE: 18 BRPM | OXYGEN SATURATION: 98 %

## 2023-08-23 DIAGNOSIS — Z85.3 HISTORY OF BREAST CANCER: Primary | ICD-10-CM

## 2023-08-23 PROCEDURE — 1159F MED LIST DOCD IN RCRD: CPT | Performed by: SURGERY

## 2023-08-23 PROCEDURE — 3078F DIAST BP <80 MM HG: CPT | Performed by: SURGERY

## 2023-08-23 PROCEDURE — 1160F RVW MEDS BY RX/DR IN RCRD: CPT | Performed by: SURGERY

## 2023-08-23 PROCEDURE — 3074F SYST BP LT 130 MM HG: CPT | Performed by: SURGERY

## 2023-08-23 PROCEDURE — 99212 OFFICE O/P EST SF 10 MIN: CPT | Performed by: SURGERY

## 2023-09-06 RX ORDER — ATORVASTATIN CALCIUM 20 MG/1
20 TABLET, FILM COATED ORAL DAILY
Qty: 90 TABLET | Refills: 0 | Status: SHIPPED | OUTPATIENT
Start: 2023-09-06

## 2023-10-16 ENCOUNTER — HOSPITAL ENCOUNTER (OUTPATIENT)
Facility: HOSPITAL | Age: 78
Discharge: HOME | End: 2023-10-16
Payer: MEDICARE

## 2023-10-16 ENCOUNTER — HOSPITAL ENCOUNTER (OUTPATIENT)
Dept: GENERAL RADIOLOGY | Facility: HOSPITAL | Age: 78
Discharge: HOME | End: 2023-10-16
Payer: MEDICARE

## 2023-10-16 DIAGNOSIS — M25.561 RIGHT KNEE PAIN, UNSPECIFIED CHRONICITY: ICD-10-CM

## 2023-10-16 PROCEDURE — 73562 X-RAY EXAM OF KNEE 3: CPT

## 2023-10-23 ENCOUNTER — OFFICE VISIT (OUTPATIENT)
Dept: ORTHOPEDIC SURGERY | Facility: CLINIC | Age: 78
End: 2023-10-23
Payer: MEDICARE

## 2023-10-23 VITALS
HEIGHT: 63 IN | DIASTOLIC BLOOD PRESSURE: 94 MMHG | BODY MASS INDEX: 28.1 KG/M2 | SYSTOLIC BLOOD PRESSURE: 144 MMHG | WEIGHT: 158.6 LBS

## 2023-10-23 DIAGNOSIS — S83.001A PATELLAR SUBLUXATION, RIGHT, INITIAL ENCOUNTER: ICD-10-CM

## 2023-10-23 DIAGNOSIS — M17.11 PRIMARY OSTEOARTHRITIS OF RIGHT KNEE: ICD-10-CM

## 2023-10-23 DIAGNOSIS — M70.41 PREPATELLAR BURSITIS OF RIGHT KNEE: ICD-10-CM

## 2023-10-23 DIAGNOSIS — M25.561 ARTHRALGIA OF RIGHT KNEE: Primary | ICD-10-CM

## 2023-10-23 PROCEDURE — 1159F MED LIST DOCD IN RCRD: CPT | Performed by: PHYSICIAN ASSISTANT

## 2023-10-23 PROCEDURE — 3077F SYST BP >= 140 MM HG: CPT | Performed by: PHYSICIAN ASSISTANT

## 2023-10-23 PROCEDURE — 3080F DIAST BP >= 90 MM HG: CPT | Performed by: PHYSICIAN ASSISTANT

## 2023-10-23 PROCEDURE — 1160F RVW MEDS BY RX/DR IN RCRD: CPT | Performed by: PHYSICIAN ASSISTANT

## 2023-10-23 PROCEDURE — 99213 OFFICE O/P EST LOW 20 MIN: CPT | Performed by: PHYSICIAN ASSISTANT

## 2023-10-23 NOTE — PROGRESS NOTES
Subjective   Patient ID: Divine Banuelos is a 78 y.o. right hand dominant female  Pain of the Right Knee (Reports falling on broken concrete on 10/8/23 while walking )         Pain  Associated symptoms include arthralgias (right knee) and joint swelling (right knee).     Patient presents for the evaluation of right knee pain after she tripped and fell on concrete 10/8/2023.  She states at first, she had swelling, pain, and the appearance/sensation that her right kneecap had moved medially. however, since the fall this has improved.  She does report continued episodic sharp pains along the inner aspect of the right knee.  She did have outpatient x-rays which we reviewed today in clinic.       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 in female, estrogen receptor positive 10/30/2020    Mitral valve prolapse     Osteoporosis     Pessary maintenance     Stroke     Wears dentures     upper and lowers        Past Surgical History:   Procedure Laterality Date    BREAST BIOPSY Right 8/17/2022    Procedure: BREAST BIOPSY;  Surgeon: Stuart Caceres MD;  Location: [facility] OR;  Service: General;  Laterality: Right;    BREAST LUMPECTOMY WITH SENTINEL NODE BIOPSY Right 11/4/2020    Procedure: BREAST LUMPECTOMY WITH SENTINEL NODE BIOPSY RIGHT;  Surgeon: Stuart Caceres MD;  Location: [facility];  Service: General;  Laterality: Right;    CATARACT EXTRACTION Bilateral     CATARACT EXTRACTION, BILATERAL      COLONOSCOPY      RETINAL DETACHMENT REPAIR Right        Family History   Problem Relation  Age of Onset    Hypertension Mother     Colon cancer Father     Colon cancer Paternal Grandmother     Colon cancer Paternal Uncle     Breast cancer Maternal Aunt     Thyroid disease Other         Nephew       Social History     Socioeconomic History    Marital status:    Tobacco Use    Smoking status: Never     Passive exposure: Never    Smokeless tobacco: Never   Vaping Use    Vaping Use: Never used   Substance and Sexual Activity    Alcohol use: No    Drug use: No    Sexual activity: Defer     Birth control/protection: Post-menopausal         Current Outpatient Medications:     acetaminophen (TYLENOL) 500 MG tablet, Take 0.5 tablets by mouth Every 6 (Six) Hours As Needed for Mild Pain., Disp: , Rfl:     apixaban (Eliquis) 5 MG tablet tablet, Take 1 tablet by mouth Every 12 (Twelve) Hours., Disp: 60 tablet, Rfl: 11    atorvastatin (LIPITOR) 20 MG tablet, Take 1 tablet by mouth Daily., Disp: 90 tablet, Rfl: 0    buPROPion XL (WELLBUTRIN XL) 150 MG 24 hr tablet, Take 1 tablet by mouth Daily., Disp: , Rfl:     Calcium Citrate (CITRACAL PO), Take 2 tablets by mouth Daily. + Magnesium, Disp: , Rfl:     estradiol (ESTRACE VAGINAL) 0.1 MG/GM vaginal cream, Massage pea size amount into vaginal opening twice weekly, Disp: 42.5 g, Rfl: 1    ipratropium (ATROVENT) 0.06 % nasal spray, 1 spray into the nostril(s) as directed by provider As Needed., Disp: , Rfl:     levothyroxine (SYNTHROID, LEVOTHROID) 50 MCG tablet, Take 1 tablet by mouth Daily., Disp: , Rfl:     metoprolol tartrate (LOPRESSOR) 25 MG tablet, Take 0.25 tablets by mouth As Needed (Sustained HR > 130 over 1 hour). Patient is to take 6.25 mg every 4-6 hours prn for sustained HR > 130 over 1 hour- Please divide tablet if possible prior to patient picking up., Disp: 10 tablet, Rfl: 0    Misc Natural Products (OSTEO BI-FLEX TRIPLE STRENGTH PO), Take 1 tablet by mouth 2 (Two) Times a Day., Disp: , Rfl:     PARoxetine (PAXIL) 10 MG tablet, Take 1 tablet by  "mouth Every Morning., Disp: , Rfl:     ZINC SULFATE PO, Take 1 tablet by mouth Daily., Disp: , Rfl:     No Known Allergies    Review of Systems   Musculoskeletal:  Positive for arthralgias (right knee) and joint swelling (right knee).       I have reviewed the medical and surgical history, family history, social history, medications, and/or allergies, and the review of systems of this report.    Objective   /94 (BP Location: Left arm, Patient Position: Sitting, Cuff Size: Adult)   Ht 160 cm (63\")   Wt 71.9 kg (158 lb 9.6 oz)   LMP  (LMP Unknown)   BMI 28.09 kg/m²    Physical Exam  Vitals and nursing note reviewed.   Constitutional:       Appearance: Normal appearance.   Pulmonary:      Effort: Pulmonary effort is normal.   Musculoskeletal:      Right knee: Swelling (mild edema, no erythema) and ecchymosis (small subsiding) present. No erythema or crepitus. Tenderness present over the medial joint line and patellar tendon. No LCL laxity or MCL laxity. Abnormal patellar mobility.      Right ankle: No deformity or ecchymosis. No tenderness.   Neurological:      Mental Status: She is alert and oriented to person, place, and time.       Right Knee Exam     Tenderness   The patient is experiencing tenderness in the lateral retinaculum and medial retinaculum.           Extremity DVT signs are negative on physical exam with negative Shawn sign, no calf pain, no palpable cords, and no skin tone change   Neurologic Exam     Mental Status   Oriented to person, place, and time.        The right knee extensor mechanism is intact.        Assessment & Plan   Independent Review of Radiographic Studies:    I did review the x-ray imaging as well as the radiology interpretation of the right knee performed from an outpatient facility.  There is no acute fracture, there was soft tissue swelling along the prepatellar bursa likely related to chronic patella tendinosis      Procedures       Diagnoses and all orders for this " visit:    1. Arthralgia of right knee (Primary)  -     XR Knee 3 View Right; Future  -     MRI Knee Right Without Contrast; Future    2. Primary osteoarthritis of right knee  -     MRI Knee Right Without Contrast; Future    3. Prepatellar bursitis of right knee  -     MRI Knee Right Without Contrast; Future    4. Patellar subluxation, right, initial encounter  -     MRI Knee Right Without Contrast; Future       Orthopedic activities reviewed and patient expressed appreciation  Discussion of orthopedic goals  Risk, benefits, and merits of treatment alternatives reviewed with the patient and questions answered    Recommendations/Plan:  Patient is encouraged to call or return for any issues or concerns.    Patient was provided a patella maltracking brace to keep the patella from subluxing  Avoid high impact activity.   Ice and elevate knee daily prn  Patient agreeable to call or return sooner for any concerns.                 EMR Dragon-transcription disclaimer:  This encounter note is an electronic transcription of spoken language to printed text.  Electronic transcription of spoken language may permit erroneous or at times nonsensical words or phrases to be inadvertently transcribed.  Although I have reviewed the note for such errors, some may still exist

## 2023-11-09 ENCOUNTER — OFFICE VISIT (OUTPATIENT)
Dept: CARDIOLOGY | Facility: CLINIC | Age: 78
End: 2023-11-09
Payer: MEDICARE

## 2023-11-09 VITALS
WEIGHT: 160 LBS | HEIGHT: 63 IN | OXYGEN SATURATION: 97 % | BODY MASS INDEX: 28.35 KG/M2 | HEART RATE: 75 BPM | SYSTOLIC BLOOD PRESSURE: 152 MMHG | DIASTOLIC BLOOD PRESSURE: 70 MMHG

## 2023-11-09 DIAGNOSIS — I48.0 PAROXYSMAL ATRIAL FIBRILLATION: ICD-10-CM

## 2023-11-09 DIAGNOSIS — I50.32 DIASTOLIC DYSFUNCTION WITH CHRONIC HEART FAILURE: Primary | ICD-10-CM

## 2023-11-09 DIAGNOSIS — I10 PRIMARY HYPERTENSION: ICD-10-CM

## 2023-11-09 NOTE — PROGRESS NOTES
[facility]  Office visit  Divine Banuelos  1945  [phone]    VISIT DATE:  11/9/2023      PCP: Queenie Amezcua, APRN  [address] [place] [id number]    CC:  Chief Complaint   Patient presents with    Atrial Fibrillation    Hypertension       PROBLEM LIST:  Hypertension:   History of elevated blood pressure due to stress.   Discontinuation of enalapril secondary to hypotension, June 2013.  Bradycardia-Holter October 2017: Average heart rate 52 bpm, range of 36-94 bpm. Asymptomatic  Hypothyroidism.  Depression.  Anxiety.  Osteoporosis, mild.  Mild dyslipidemia.  History of Bell’s palsy.  Surgical history:  Bilateral cataract extraction.    Cardiac studies and procedures:  September 2019  Echo  Estimated EF appears to be in the range of 56 - 60%.  Left ventricular diastolic dysfunction (grade II) consistent with pseudonormalization.  Left atrial cavity size is borderline dilated.  Mild mitral valve regurgitation is present  Exercise myocardial perfusion imaging  A stress test was performed following the Fredis protocol.  The patient reported shortness of breath during the stress test.  Blood pressure demonstrated a hypertensive response to stress. (215/97).  There was no ST segment deviation noted during stress.  Overall, the patient's exercise capacity was normal. JOSE MARIA%: 0/1.  Left ventricular ejection fraction is normal (Calculated EF = 66%).  Myocardial perfusion imaging indicates a normal myocardial perfusion study with no evidence of ischemia.  Impressions are consistent with a low risk study.    March 2022  TTE   Normal left ventricle systolic function with an estimated ejection fraction    of 55-60%.    There is grade 3 diastolic dysfunction present.    The left atrium is moderately dilated.    Mild mitral regurgitation is present.    Mild tricuspid regurgitation is present.    Estimated pulmonary artery systolic pressure is mildly elevated at 37 mmHg.     MRI brain  1.  "Acute cortical infarction 8 x 4 mm involving the left precentral gyrus     2. Progression of mild cerebral white matter disease, most likely sequela chronic small vessel ischemia. Differential diagnosis also includes but is not limited to: demyelinating disease including multiple sclerosis and Lyme disease, migraine headache   effect, vasculitis, trauma and gliosis.    CTA head neck  1.  No significant stenosis or aneurysm.   2.  No CT correlate for the punctate area of acute ischemia in the left precentral gyrus seen on MRI.   1.  Mild bilateral proximal cervical internal carotid artery stenosis.    ASSESSMENT:   Diagnosis Plan   1. Diastolic dysfunction with chronic heart failure        2. Primary hypertension        3. Paroxysmal atrial fibrillation            PLAN:  Congestive heart failure, diastolic, chronic:  No evidence of volume overload.  Continue regular aerobic exercise.  Afterload well controlled.  We will continue to trend symptoms.    Blood pressure lability: Blood pressures currently reasonably well controlled based on home blood pressure readings.    Hyperlipidemia: Goal LDL less than 100, ideally less than 70.  Continue atorvastatin 20 mg p.o. daily.    Atrial fibrillation, paroxysmal: Diagnosed after CVA in March 2020.  [id number] equal to 7.  Continue Eliquis 5 mg p.o. twice daily.  Continue as needed beta-blockade.  Not recommending antiarrhythmic therapy at this time.        Subjective  Continues to maintain an active lifestyle.  Recent fall on a concrete sidewalk while exercising resulting in contusion to her right knee, persistent swelling, MRI pending. Denies chest pain, palpitations, presyncope, syncope or dyspnea on exertion.     PHYSICAL EXAMINATION:  Vitals:    11/09/23 1133   BP: 152/70   BP Location: Right arm   Patient Position: Sitting   Pulse: 75   SpO2: 97%   Weight: 72.6 kg (160 lb)   Height: 160 cm (63\")     General Appearance:    Alert, cooperative, no distress, appears stated " age   Head:    Normocephalic, without obvious abnormality, atraumatic   Lungs:     Clear to auscultation bilaterally, respirations unlabored   Chest Wall:    No tenderness or deformity    Heart:   Irregularly irregular, S1 and S2 normal, no murmur, rub   or gallop, normal carotid impulse bilaterally without bruit.   Pulses:   2+ and symmetric all extremities   Skin:   Skin color, texture, turgor normal, no rashes or lesions       Diagnostic Data:  Procedures  Lab Results   Component Value Date    CHLPL 158 03/01/2023    TRIG 70 03/01/2023    HDL 64 (H) 03/01/2023     Lab Results   Component Value Date    GLUCOSE 87 06/08/2023    BUN 15 06/08/2023    CREATININE 0.92 06/08/2023     06/08/2023    K 3.9 06/08/2023     06/08/2023    CO2 25.3 06/08/2023     Lab Results   Component Value Date    HGBA1C 6.1 (H) 03/01/2023     Lab Results   Component Value Date    WBC 7.0 03/01/2023    HGB 14.2 03/01/2023    HCT 44.0 03/01/2023     03/01/2023       Allergies  No Known Allergies    Current Medications    Current Outpatient Medications:     acetaminophen (TYLENOL) 500 MG tablet, Take 0.5 tablets by mouth Every 6 (Six) Hours As Needed for Mild Pain., Disp: , Rfl:     apixaban (Eliquis) 5 MG tablet tablet, Take 1 tablet by mouth Every 12 (Twelve) Hours., Disp: 60 tablet, Rfl: 11    atorvastatin (LIPITOR) 20 MG tablet, Take 1 tablet by mouth Daily., Disp: 90 tablet, Rfl: 0    buPROPion XL (WELLBUTRIN XL) 150 MG 24 hr tablet, Take 1 tablet by mouth Daily., Disp: , Rfl:     Calcium Citrate (CITRACAL PO), Take 2 tablets by mouth Daily. + Magnesium, Disp: , Rfl:     estradiol (ESTRACE VAGINAL) 0.1 MG/GM vaginal cream, Massage pea size amount into vaginal opening twice weekly, Disp: 42.5 g, Rfl: 1    ipratropium (ATROVENT) 0.06 % nasal spray, 1 spray into the nostril(s) as directed by provider As Needed., Disp: , Rfl:     levothyroxine (SYNTHROID, LEVOTHROID) 50 MCG tablet, Take 1 tablet by mouth Daily., Disp: ,  Rfl:     metoprolol tartrate (LOPRESSOR) 25 MG tablet, Take 0.25 tablets by mouth As Needed (Sustained HR > 130 over 1 hour). Patient is to take 6.25 mg every 4-6 hours prn for sustained HR > 130 over 1 hour- Please divide tablet if possible prior to patient picking up., Disp: 10 tablet, Rfl: 0    Misc Natural Products (OSTEO BI-FLEX TRIPLE STRENGTH PO), Take 1 tablet by mouth 2 (Two) Times a Day., Disp: , Rfl:     PARoxetine (PAXIL) 10 MG tablet, Take 1 tablet by mouth Every Morning., Disp: , Rfl:     ZINC SULFATE PO, Take 1 tablet by mouth Daily., Disp: , Rfl:           ROS  Review of Systems   Cardiovascular:  Positive for dyspnea on exertion. Negative for chest pain, irregular heartbeat and near-syncope.   Respiratory:  Positive for shortness of breath. Negative for cough.        SOCIAL HX  Social History     Socioeconomic History    Marital status:    Tobacco Use    Smoking status: Never     Passive exposure: Never    Smokeless tobacco: Never   Vaping Use    Vaping Use: Never used   Substance and Sexual Activity    Alcohol use: No    Drug use: No    Sexual activity: Defer     Birth control/protection: Post-menopausal       FAMILY HX  Family History   Problem Relation Age of Onset    Hypertension Mother     Colon cancer Father     Colon cancer Paternal Grandmother     Colon cancer Paternal Uncle     Breast cancer Maternal Aunt     Thyroid disease Other         Nephew             Reinaldo Cope III, MD, FACC

## 2023-11-16 ENCOUNTER — TELEPHONE (OUTPATIENT)
Dept: ORTHOPEDIC SURGERY | Facility: CLINIC | Age: 78
End: 2023-11-16
Payer: MEDICARE

## 2023-11-16 NOTE — TELEPHONE ENCOUNTER
Scheduled MRI at [facility] on 11/27/23 @ 10:00 am. Patient notified. Order faxed to [facility].

## 2023-11-16 NOTE — TELEPHONE ENCOUNTER
Caller: Divine Banuelos    Relationship: Self    Best call back number: [phone]    What orders are you requesting (i.e. lab or imaging): MRI RT KNEE    In what timeframe would the patient need to come in: ASAP    Where will you receive your lab/imaging services: [facility] [place]  PH: [phone]

## 2023-11-27 ENCOUNTER — HOSPITAL ENCOUNTER (OUTPATIENT)
Dept: MRI IMAGING | Facility: HOSPITAL | Age: 78
Discharge: HOME | End: 2023-11-27
Payer: MEDICARE

## 2023-11-27 DIAGNOSIS — M70.41 PREPATELLAR BURSITIS OF RIGHT KNEE: ICD-10-CM

## 2023-11-27 DIAGNOSIS — M17.11 ARTHRITIS OF RIGHT KNEE: ICD-10-CM

## 2023-11-27 DIAGNOSIS — S83.001A SUBLUXATION OF RIGHT PATELLA, INITIAL ENCOUNTER: ICD-10-CM

## 2023-11-27 DIAGNOSIS — M25.561 ARTHRALGIA OF KNEE, RIGHT: ICD-10-CM

## 2023-11-27 PROCEDURE — 73721 MRI JNT OF LWR EXTRE W/O DYE: CPT

## 2023-11-27 RX ORDER — ATORVASTATIN CALCIUM 20 MG/1
20 TABLET, FILM COATED ORAL DAILY
Qty: 90 TABLET | Refills: 0 | Status: SHIPPED | OUTPATIENT
Start: 2023-11-27

## 2023-11-29 ENCOUNTER — TELEPHONE (OUTPATIENT)
Dept: CARDIOLOGY | Facility: CLINIC | Age: 78
End: 2023-11-29
Payer: MEDICARE

## 2023-11-29 NOTE — TELEPHONE ENCOUNTER
GRACIA  Had an episode last night where her Lt shoulder and whole body shook for one minute.Thought it maybe A-Fib.Denies soa,chest pain or palpitations. B/p today 136/88 HR 72. She wanted you to be notified of this episode.

## 2023-11-30 ENCOUNTER — OFFICE VISIT (OUTPATIENT)
Dept: ORTHOPEDIC SURGERY | Facility: CLINIC | Age: 78
End: 2023-11-30
Payer: MEDICARE

## 2023-11-30 VITALS
BODY MASS INDEX: 28.28 KG/M2 | WEIGHT: 159.6 LBS | SYSTOLIC BLOOD PRESSURE: 156 MMHG | HEIGHT: 63 IN | DIASTOLIC BLOOD PRESSURE: 86 MMHG

## 2023-11-30 DIAGNOSIS — M17.11 PRIMARY OSTEOARTHRITIS OF RIGHT KNEE: ICD-10-CM

## 2023-11-30 DIAGNOSIS — M23.306 MENISCUS DEGENERATION, RIGHT: ICD-10-CM

## 2023-11-30 DIAGNOSIS — M25.561 ARTHRALGIA OF RIGHT KNEE: Primary | ICD-10-CM

## 2023-11-30 PROCEDURE — 99213 OFFICE O/P EST LOW 20 MIN: CPT | Performed by: PHYSICIAN ASSISTANT

## 2023-11-30 NOTE — PROGRESS NOTES
"Subjective   Patient ID: Divine Banuelos is a 78 y.o. right hand dominant female  Follow-up of the Right Knee (Here to review MRI )         History of Present Illness  Patient presents to review MRI results of the right knee after a trip and fall.  She is pleased to report she is feeling much better.  She only has \"occasional discomfort with certain movements of the right knee.                                                 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 in female, estrogen receptor positive 10/30/2020    Mitral valve prolapse     Osteoporosis     Pessary maintenance     Stroke     Wears dentures     upper and lowers        Past Surgical History:   Procedure Laterality Date    BREAST BIOPSY Right 8/17/2022    Procedure: BREAST BIOPSY;  Surgeon: Stuart Caceres MD;  Location: [facility];  Service: General;  Laterality: Right;    BREAST LUMPECTOMY WITH SENTINEL NODE BIOPSY Right 11/4/2020    Procedure: BREAST LUMPECTOMY WITH SENTINEL NODE BIOPSY RIGHT;  Surgeon: Stuart Caceres MD;  Location: [facility] OR;  Service: General;  Laterality: Right;    CATARACT EXTRACTION Bilateral     CATARACT EXTRACTION, BILATERAL      COLONOSCOPY      RETINAL DETACHMENT REPAIR Right        Family History   Problem Relation Age of Onset    Hypertension Mother     Colon cancer Father     Colon cancer Paternal Grandmother     Colon cancer Paternal Uncle     Breast cancer Maternal Aunt     Thyroid disease Other         Nephew       Social History     Socioeconomic History    Marital status: "    Tobacco Use    Smoking status: Never     Passive exposure: Never    Smokeless tobacco: Never   Vaping Use    Vaping Use: Never used   Substance and Sexual Activity    Alcohol use: No    Drug use: No    Sexual activity: Defer     Birth control/protection: Post-menopausal         Current Outpatient Medications:     acetaminophen (TYLENOL) 500 MG tablet, Take 0.5 tablets by mouth Every 6 (Six) Hours As Needed for Mild Pain., Disp: , Rfl:     apixaban (Eliquis) 5 MG tablet tablet, Take 1 tablet by mouth Every 12 (Twelve) Hours., Disp: 60 tablet, Rfl: 11    atorvastatin (LIPITOR) 20 MG tablet, TAKE 1 TABLET BY MOUTH DAILY, Disp: 90 tablet, Rfl: 0    buPROPion XL (WELLBUTRIN XL) 150 MG 24 hr tablet, Take 1 tablet by mouth Daily., Disp: , Rfl:     Calcium Citrate (CITRACAL PO), Take 2 tablets by mouth Daily. + Magnesium, Disp: , Rfl:     estradiol (ESTRACE VAGINAL) 0.1 MG/GM vaginal cream, Massage pea size amount into vaginal opening twice weekly, Disp: 42.5 g, Rfl: 1    ipratropium (ATROVENT) 0.06 % nasal spray, 1 spray into the nostril(s) as directed by provider As Needed., Disp: , Rfl:     levothyroxine (SYNTHROID, LEVOTHROID) 50 MCG tablet, Take 1 tablet by mouth Daily., Disp: , Rfl:     metoprolol tartrate (LOPRESSOR) 25 MG tablet, Take 0.25 tablets by mouth As Needed (Sustained HR > 130 over 1 hour). Patient is to take 6.25 mg every 4-6 hours prn for sustained HR > 130 over 1 hour- Please divide tablet if possible prior to patient picking up., Disp: 10 tablet, Rfl: 0    Misc Natural Products (OSTEO BI-FLEX TRIPLE STRENGTH PO), Take 1 tablet by mouth 2 (Two) Times a Day., Disp: , Rfl:     PARoxetine (PAXIL) 10 MG tablet, Take 1 tablet by mouth Every Morning., Disp: , Rfl:     ZINC SULFATE PO, Take 1 tablet by mouth Daily., Disp: , Rfl:     No Known Allergies    Review of Systems   Musculoskeletal:  Positive for arthralgias (right knee).       I have reviewed the medical and surgical history, family history,  "social history, medications, and/or allergies, and the review of systems of this report.    Objective   /86 (BP Location: Left arm, Patient Position: Sitting, Cuff Size: Adult)   Ht 160 cm (63\")   Wt 72.4 kg (159 lb 9.6 oz)   LMP  (LMP Unknown)   BMI 28.27 kg/m²    Physical Exam  Vitals and nursing note reviewed.   Constitutional:       Appearance: Normal appearance.   Musculoskeletal:      Right knee: No deformity, erythema, ecchymosis or crepitus. Normal range of motion. No LCL laxity, MCL laxity, ACL laxity or PCL laxity. Normal meniscus.   Neurological:      Mental Status: She is alert.       Ortho Exam   Extremity DVT signs are negative on physical exam with negative Shawn sign, no calf pain, no palpable cords, and no skin tone change   Neurologic Exam     The right knee extensor mechanism is intact      Assessment & Plan   Independent Review of Radiographic Studies:    No new imaging done today.  I did review the MRI imaging as well as the MRI report from radiology.  I reviewed this with the patient as well.  There is evidence of medial and lateral meniscal degeneration, nonspecific edema to the anterior prepatellar region as well as osteoarthritic changes.    Procedures       Diagnoses and all orders for this visit:    1. Arthralgia of right knee (Primary)    2. Primary osteoarthritis of right knee    3. Meniscus degeneration, right       Orthopedic activities reviewed and patient expressed appreciation  Regular exercise as tolerated  Risk, benefits, and merits of treatment alternatives reviewed with the patient and questions answered    Recommendations/Plan:  Exercise, medications, injections, other patient advice, and return appointment as noted.  Patient is encouraged to call or return for any issues or concerns.  Patient politely declined a right knee cortisone injection today.  She states that she wanted to hold off as her pain was not significantly bothersome.  She may return to the clinic at " any point in the future for reevaluation and possible cortisone therapy    Patient agreeable to call or return sooner for any concerns.               EMR Dragon-transcription disclaimer:  This encounter note is an electronic transcription of spoken language to printed text.  Electronic transcription of spoken language may permit erroneous or at times nonsensical words or phrases to be inadvertently transcribed.  Although I have reviewed the note for such errors, some may still exist

## 2023-12-15 ENCOUNTER — OFFICE VISIT (OUTPATIENT)
Dept: NEUROLOGY | Facility: CLINIC | Age: 78
End: 2023-12-15
Payer: MEDICARE

## 2023-12-15 ENCOUNTER — LAB (OUTPATIENT)
Dept: LAB | Facility: HOSPITAL | Age: 78
End: 2023-12-15
Payer: MEDICARE

## 2023-12-15 VITALS
HEIGHT: 63 IN | SYSTOLIC BLOOD PRESSURE: 110 MMHG | BODY MASS INDEX: 27.64 KG/M2 | HEART RATE: 86 BPM | WEIGHT: 156 LBS | TEMPERATURE: 98 F | OXYGEN SATURATION: 96 % | DIASTOLIC BLOOD PRESSURE: 64 MMHG

## 2023-12-15 DIAGNOSIS — I48.0 PAROXYSMAL ATRIAL FIBRILLATION: ICD-10-CM

## 2023-12-15 DIAGNOSIS — Z86.73 HISTORY OF STROKE: ICD-10-CM

## 2023-12-15 DIAGNOSIS — R25.1 TREMOR: Primary | ICD-10-CM

## 2023-12-15 DIAGNOSIS — R25.1 TREMOR: ICD-10-CM

## 2023-12-15 DIAGNOSIS — I10 PRIMARY HYPERTENSION: ICD-10-CM

## 2023-12-15 LAB
ALBUMIN SERPL-MCNC: 4 G/DL (ref 3.5–5.2)
ALBUMIN/GLOB SERPL: 1.4 G/DL
ALP SERPL-CCNC: 138 U/L (ref 39–117)
ALT SERPL W P-5'-P-CCNC: 11 U/L (ref 1–33)
ANION GAP SERPL CALCULATED.3IONS-SCNC: 11.5 MMOL/L (ref 5–15)
AST SERPL-CCNC: 13 U/L (ref 1–32)
BILIRUB SERPL-MCNC: 0.4 MG/DL (ref 0–1.2)
BUN SERPL-MCNC: 9 MG/DL (ref 8–23)
BUN/CREAT SERPL: 9 (ref 7–25)
CALCIUM SPEC-SCNC: 9.2 MG/DL (ref 8.6–10.5)
CHLORIDE SERPL-SCNC: 104 MMOL/L (ref 98–107)
CO2 SERPL-SCNC: 25.5 MMOL/L (ref 22–29)
CREAT SERPL-MCNC: 1 MG/DL (ref 0.57–1)
DEPRECATED RDW RBC AUTO: 39.7 FL (ref 37–54)
EGFRCR SERPLBLD CKD-EPI 2021: 57.4 ML/MIN/1.73
ERYTHROCYTE [DISTWIDTH] IN BLOOD BY AUTOMATED COUNT: 12.4 % (ref 12.3–15.4)
FOLATE SERPL-MCNC: 10.8 NG/ML (ref 4.78–24.2)
GLOBULIN UR ELPH-MCNC: 2.9 GM/DL
GLUCOSE SERPL-MCNC: 98 MG/DL (ref 65–99)
HCT VFR BLD AUTO: 40 % (ref 34–46.6)
HGB BLD-MCNC: 13.2 G/DL (ref 12–15.9)
MCH RBC QN AUTO: 29.3 PG (ref 26.6–33)
MCHC RBC AUTO-ENTMCNC: 33 G/DL (ref 31.5–35.7)
MCV RBC AUTO: 88.7 FL (ref 79–97)
PLATELET # BLD AUTO: 355 10*3/MM3 (ref 140–450)
PMV BLD AUTO: 9.9 FL (ref 6–12)
POTASSIUM SERPL-SCNC: 4.2 MMOL/L (ref 3.5–5.2)
PROT SERPL-MCNC: 6.9 G/DL (ref 6–8.5)
RBC # BLD AUTO: 4.51 10*6/MM3 (ref 3.77–5.28)
SODIUM SERPL-SCNC: 141 MMOL/L (ref 136–145)
TSH SERPL DL<=0.05 MIU/L-ACNC: 2.82 UIU/ML (ref 0.27–4.2)
VIT B12 BLD-MCNC: 743 PG/ML (ref 211–946)
WBC NRBC COR # BLD AUTO: 7.47 10*3/MM3 (ref 3.4–10.8)

## 2023-12-15 PROCEDURE — 80053 COMPREHEN METABOLIC PANEL: CPT

## 2023-12-15 PROCEDURE — 3074F SYST BP LT 130 MM HG: CPT | Performed by: NURSE PRACTITIONER

## 2023-12-15 PROCEDURE — 36415 COLL VENOUS BLD VENIPUNCTURE: CPT

## 2023-12-15 PROCEDURE — 3078F DIAST BP <80 MM HG: CPT | Performed by: NURSE PRACTITIONER

## 2023-12-15 PROCEDURE — 82607 VITAMIN B-12: CPT

## 2023-12-15 PROCEDURE — 99214 OFFICE O/P EST MOD 30 MIN: CPT | Performed by: NURSE PRACTITIONER

## 2023-12-15 PROCEDURE — 84443 ASSAY THYROID STIM HORMONE: CPT

## 2023-12-15 PROCEDURE — 85027 COMPLETE CBC AUTOMATED: CPT

## 2023-12-15 PROCEDURE — 82746 ASSAY OF FOLIC ACID SERUM: CPT

## 2023-12-15 NOTE — PROGRESS NOTES
Follow Up Office Visit      Encounter Date: 12/15/2023   Patient Name: Divine Banuelos  : 1945   MRN: [id number]   PCP: Queenie Amezcua APRN    Chief Complaint:    Chief Complaint   Patient presents with    Follow-up       History of Present Illness:   3/25/2022-   Divine Banuelos is a 76 y.o. female who is here today to establish care.  Patient presents to clinic as a referral from her hospitalization at [facility] and early 2022 where she was noted to have had a tiny area of infarct in her left precentral gyrus.  Apparently she presented to the emergency department with numbness in 2 of her fingers that lasted 1 to 2 minutes, followed by some right hand incoordination the last approximately 1/2-hour and resolved on its own.  Patient had full stroke work-up at the outlying facility, however she does not bring any records or disc with her.  We did receive some records for which I have reviewed.  Prior to admission her known medical diagnoses were depression, hypothyroidism, anxiety and depression.  She was found to be in A. fib while hospitalized and was discharged home on Eliquis 5 mg twice daily, as well as aspirin 81 mg daily.  She continues to take these medications, she denies any overt bleeding.  Her symptoms have resolved and she feels back to baseline.     2022-  Patient presents to clinic in follow-up for previous stroke.  She tells me she feels basically back to her baseline with no new neurologic symptoms.  She does tell me occasionally she will have problems writing with the first 2 fingers on her right hand which were the area of concern with her stroke, however, this only happens occasionally.  She had stopped her atorvastatin and Eliquis for a few days as she was having full body itching and hives.  She was working with cardiologist in trying to determine the source of that, she tells me she has figured out that it was not medication related that it was related to  mold in her house.  She did recently have her lipid panel rechecked.  There was a note from Dr. Cope's office that he was changing her to Xarelto, however that was when they were concerned that Eliquis could be causing her symptoms.  She is now taking Eliquis 5 mg twice daily, cardiology stopped her daily aspirin which I am okay with, and she continues atorvastatin 20 mg nightly for secondary stroke prevention.  Her LDL levels did drop from 155-125 over the last 2 months on 20 mg, patient is reluctant at this time to increase that any further and will continue to work with her primary care on getting her LDL levels lower.  I told her the goal would be less than 70.     11/28/2022-  Patient presents to clinic today accompanied by a friend.  She denies any new neurologic symptoms.  She feels she is at her baseline with no residual symptoms from her March left precentral gyrus infarct.  She continues to take Eliquis 5 mg twice daily, she denies any adverse effects she recently had lipid panel drawn by her primary care in October where her LDL was revealed to be down to 74.  She continues to take atorvastatin 20 mg nightly with no adverse effects     6/7/2023- patient reports that she has been doing well since her last visit.  She has no residual stroke symptoms.  She has been compliant with all medications.  She does report that she had elevated LFTs several months ago.  She was taken off of her atorvastatin at that time.  She followed up with Dr. Cope of cardiology who restarted her Lipitor.  We will recheck a CMP today.  In addition, patient states that she was diagnosed with PTSD.  She was prescribed Wellbutrin and reports it has been life-changing for her.  She has been sleeping well with no nightmares and her mood has improved significantly.    Today's clinic visit 12/15/2023: Patient presents today for follow-up accompanied by family member.  She states overall she has been doing very well.  She continues to  have some intermittent tingling in her left arm that she says is secondary to an injury that she sees a chiropractor for.  Denies any weakness, no speech or vision difficulties, no balance disturbance.  She reports compliance with all medications including Eliquis; denies signs and symptoms of bleeding.  She does describe having an episode of left-sided tremor at the end of November.  She states that she was asleep and her left arm and leg were shaking so badly she was awakened.  She recalls being aware what is unsure if she was able to speak; no loss of bowel or bladder.  No history of seizures.  No recurrence of a similar episode.      Subjective        I have reviewed and the following portions of the patient's history were updated as appropriate: past family history, past medical history, past social history, past surgical history and problem list.    Medications:     Current Outpatient Medications:     acetaminophen (TYLENOL) 500 MG tablet, Take 0.5 tablets by mouth Every 6 (Six) Hours As Needed for Mild Pain., Disp: , Rfl:     apixaban (Eliquis) 5 MG tablet tablet, Take 1 tablet by mouth Every 12 (Twelve) Hours., Disp: 60 tablet, Rfl: 11    atorvastatin (LIPITOR) 20 MG tablet, TAKE 1 TABLET BY MOUTH DAILY, Disp: 90 tablet, Rfl: 0    buPROPion XL (WELLBUTRIN XL) 150 MG 24 hr tablet, Take 1 tablet by mouth Daily., Disp: , Rfl:     Calcium Citrate (CITRACAL PO), Take 2 tablets by mouth Daily. + Magnesium, Disp: , Rfl:     estradiol (ESTRACE VAGINAL) 0.1 MG/GM vaginal cream, Massage pea size amount into vaginal opening twice weekly, Disp: 42.5 g, Rfl: 1    ipratropium (ATROVENT) 0.06 % nasal spray, 1 spray into the nostril(s) as directed by provider As Needed., Disp: , Rfl:     levothyroxine (SYNTHROID, LEVOTHROID) 50 MCG tablet, Take 1 tablet by mouth Daily., Disp: , Rfl:     Misc Natural Products (OSTEO BI-FLEX TRIPLE STRENGTH PO), Take 1 tablet by mouth 2 (Two) Times a Day., Disp: , Rfl:     PARoxetine (PAXIL)  "10 MG tablet, Take 1 tablet by mouth Every Morning., Disp: , Rfl:     ZINC SULFATE PO, Take 1 tablet by mouth Daily., Disp: , Rfl:     metoprolol tartrate (LOPRESSOR) 25 MG tablet, Take 0.25 tablets by mouth As Needed (Sustained HR > 130 over 1 hour). Patient is to take 6.25 mg every 4-6 hours prn for sustained HR > 130 over 1 hour- Please divide tablet if possible prior to patient picking up. (Patient not taking: Reported on 12/15/2023), Disp: 10 tablet, Rfl: 0    Allergies:   No Known Allergies    Objective     Physical Exam:   Vital Signs:   Vitals:    12/15/23 1319   BP: 110/64   Pulse: 86   Temp: 98 °F (36.7 °C)   SpO2: 96%   Weight: 70.8 kg (156 lb)   Height: 160 cm (62.99\")     Body mass index is 27.64 kg/m².    Physical Exam  Vitals reviewed.   Constitutional:       Appearance: Normal appearance.   HENT:      Head: Normocephalic and atraumatic.   Eyes:      General: Lids are normal.      Extraocular Movements: Extraocular movements intact.      Pupils: Pupils are equal, round, and reactive to light.   Cardiovascular:      Rate and Rhythm: Normal rate. Rhythm irregular.   Pulmonary:      Effort: Pulmonary effort is normal. No respiratory distress.   Musculoskeletal:         General: No swelling. Normal range of motion.      Cervical back: Normal range of motion and neck supple.   Skin:     General: Skin is warm and dry.   Neurological:      General: No focal deficit present.      Mental Status: She is alert and oriented to person, place, and time. Mental status is at baseline.      Cranial Nerves: No cranial nerve deficit.      Sensory: No sensory deficit.      Motor: Motor strength is normal.No weakness.   Psychiatric:         Mood and Affect: Mood normal.         Speech: Speech normal.         Behavior: Behavior normal.       Neurological Exam  Mental Status  Alert. Oriented to person, place, and time. Speech is normal. Language is fluent with no aphasia. Attention and concentration are normal.    Cranial " Nerves  CN II: Visual fields full to confrontation.  CN III, IV, VI: Extraocular movements intact bilaterally. Normal lids and orbits bilaterally. Pupils equal round and reactive to light bilaterally.  CN V: Facial sensation is normal.  CN VII: Full and symmetric facial movement.  CN XI: Shoulder shrug strength is normal.  CN XII: Tongue midline without atrophy or fasciculations.    Motor  Normal muscle bulk throughout. No fasciculations present. Normal muscle tone. No abnormal involuntary movements. Strength is 5/5 throughout all four extremities.    Sensory  Light touch is normal in upper and lower extremities.     Coordination  Right: Finger-to-nose normal. Rapid alternating movement normal.Left: Finger-to-nose normal. Rapid alternating movement normal.    Gait  Casual gait is normal including stance, stride, and arm swing.       Procedures    Results Reviewed:   Labs  AST 17  ALT 18    No new imaging available for my review  Assessment / Plan      Assessment/Plan:   Diagnoses and all orders for this visit:    1. History of stroke (Primary)  - Continue Eliquis 5 mg twice daily  - Continue atorvastatin 20 mg daily  - BP goals less than 130/80  - Heart healthy diet  - Increase activity as tolerated  - Return to the ED with any additional stroke symptoms     2.  Tremor  -Reported left-sided tremor occurring during sleep lasting less than 1 minute  -EEG  -CBC, CMP, TSH, folate, vitamin B12    3. Primary hypertension  - BP goals less than 130/80  - Management per PCP     4. Paroxysmal atrial fibrillation  - Continue Eliquis 5 mg twice daily  - Rate and rhythm control per cardiology     5. Hyperlipidemia LDL goal <70  - Continue atorvastatin 20 mg daily  - Heart healthy diet  - Further management per primary care    Discussed the importance of medication compliance Atorvastatin 20mg nightly and Eliquis 5mg twice daily and lifestyle modifications adequate control of blood pressure, adequate control of cholesterol (goal  LDL <70), increased physical activity, and implementation of healthy diet to help reduce the risk of future cerebrovascular events.  Also discussed the signs symptoms that would warrant the patient return back to the emergency department including unilateral weakness, unilateral numbness, visual disturbances, loss of balance, speech difficulties, and/or a sudden severe headache.  Patient verbalized understanding.    Follow Up:   Return if symptoms worsen or fail to improve.    GILBERT Mccormick  [facility] [place] [facility]

## 2023-12-18 ENCOUNTER — OFFICE VISIT (OUTPATIENT)
Dept: ORTHOPEDIC SURGERY | Facility: CLINIC | Age: 78
End: 2023-12-18
Payer: MEDICARE

## 2023-12-18 ENCOUNTER — TELEPHONE (OUTPATIENT)
Dept: NEUROLOGY | Facility: CLINIC | Age: 78
End: 2023-12-18
Payer: MEDICARE

## 2023-12-18 VITALS
BODY MASS INDEX: 27.75 KG/M2 | HEIGHT: 63 IN | DIASTOLIC BLOOD PRESSURE: 84 MMHG | WEIGHT: 156.6 LBS | SYSTOLIC BLOOD PRESSURE: 152 MMHG

## 2023-12-18 DIAGNOSIS — M25.512 CHRONIC LEFT SHOULDER PAIN: ICD-10-CM

## 2023-12-18 DIAGNOSIS — M75.52 ACUTE BURSITIS OF LEFT SHOULDER: Primary | ICD-10-CM

## 2023-12-18 DIAGNOSIS — G89.29 CHRONIC LEFT SHOULDER PAIN: ICD-10-CM

## 2023-12-18 RX ORDER — LIDOCAINE HYDROCHLORIDE 20 MG/ML
2 INJECTION, SOLUTION INFILTRATION; PERINEURAL
Status: COMPLETED | OUTPATIENT
Start: 2023-12-18 | End: 2023-12-18

## 2023-12-18 RX ORDER — METHYLPREDNISOLONE ACETATE 40 MG/ML
40 INJECTION, SUSPENSION INTRA-ARTICULAR; INTRALESIONAL; INTRAMUSCULAR; SOFT TISSUE
Status: COMPLETED | OUTPATIENT
Start: 2023-12-18 | End: 2023-12-18

## 2023-12-18 RX ADMIN — METHYLPREDNISOLONE ACETATE 40 MG: 40 INJECTION, SUSPENSION INTRA-ARTICULAR; INTRALESIONAL; INTRAMUSCULAR; SOFT TISSUE at 13:07

## 2023-12-18 RX ADMIN — LIDOCAINE HYDROCHLORIDE 2 ML: 20 INJECTION, SOLUTION INFILTRATION; PERINEURAL at 13:07

## 2023-12-18 NOTE — TELEPHONE ENCOUNTER
----- Message from GILBERT Mccormick sent at 12/16/2023  5:56 PM EST -----  Can you please call MS. Banuelos and let her know that the lab results we obtained due to her episode of tremor/shaking were all normal.

## 2023-12-18 NOTE — PROGRESS NOTES
"Subjective   Divine Banuelos is a 78 y.o. female here today for injection therapy.    Chief Complaint   Patient presents with    Left Shoulder - Injections     Requesting cortisone injection    Patient presents for left shoulder cortisone injection.      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 in female, estrogen receptor positive 10/30/2020    Mitral valve prolapse     Osteoporosis     Pessary maintenance     Stroke     Wears dentures     upper and lowers         Past Surgical History:   Procedure Laterality Date    BREAST BIOPSY Right 8/17/2022    Procedure: BREAST BIOPSY;  Surgeon: Stuart Caceres MD;  Location: [facility] OR;  Service: General;  Laterality: Right;    BREAST LUMPECTOMY WITH SENTINEL NODE BIOPSY Right 11/4/2020    Procedure: BREAST LUMPECTOMY WITH SENTINEL NODE BIOPSY RIGHT;  Surgeon: Stuart Caceres MD;  Location: [facility] OR;  Service: General;  Laterality: Right;    CATARACT EXTRACTION Bilateral     CATARACT EXTRACTION, BILATERAL      COLONOSCOPY      RETINAL DETACHMENT REPAIR Right        No Known Allergies    Objective   /84 (BP Location: Left arm, Patient Position: Sitting, Cuff Size: Adult)   Ht 160 cm (62.99\")   Wt 71 kg (156 lb 9.6 oz)   LMP  (LMP Unknown)   BMI 27.75 kg/m²    Physical Exam    Skin exam stable with no erythema, ecchymosis or rash.  No new swelling.  No motor or sensory changes.  Distal pulse intact.    Large Joint Arthrocentesis: L subacromial bursa  Date/Time: 12/18/2023 1:07 PM  Consent given by: patient  Site marked: site " marked  Timeout: Immediately prior to procedure a time out was called to verify the correct patient, procedure, equipment, support staff and site/side marked as required   Supporting Documentation  Indications: pain   Procedure Details  Location: shoulder - L subacromial bursa  Preparation: Patient was prepped and draped in the usual sterile fashion  Needle size: 22 G  Approach: posterior  Medications administered: 40 mg methylPREDNISolone acetate 40 MG/ML; 2 mL lidocaine 2%  Patient tolerance: patient tolerated the procedure well with no immediate complications        Assessment & Plan      Diagnosis Plan   1. Acute bursitis of left shoulder        2. Chronic left shoulder pain            Discussion of orthopaedic goals and activities and patient expressed appreciation.  Regular exercise as tolerated  Ice, heat, and/or modalities as beneficial  Watch for signs and symptoms of infection  Call or notify for any adverse effect from injection therapy    Recommendations:  Follow up in 3 months      Patient agreeable to call or return sooner for any concerns.

## 2023-12-18 NOTE — TELEPHONE ENCOUNTER
TTC patient to relay the message from our provider.  Someone answered the phone. When I identified myself they did not talk.  When I called back the phone just rings busy. Will try again later.

## 2023-12-22 ENCOUNTER — TELEPHONE (OUTPATIENT)
Dept: NEUROLOGY | Facility: CLINIC | Age: 78
End: 2023-12-22
Payer: MEDICARE

## 2023-12-22 NOTE — TELEPHONE ENCOUNTER
Caller: FLORES    Relationship to Patient: SELF    Phone Number: [phone]    Reason for Call: PT CALLING TO SEE IF PROVIDER CAN SEND THE REFERRAL FOR THE EEG TO [facility].   STATED SHE DOES NOT THINK SHE CAN MAKE THE 10:30 APPT IN [place]     PLEASE CALL & ADVISE

## 2024-01-26 ENCOUNTER — HOSPITAL ENCOUNTER (OUTPATIENT)
Dept: NEUROLOGY | Facility: HOSPITAL | Age: 79
Discharge: HOME | End: 2024-01-26
Payer: MEDICARE

## 2024-01-26 DIAGNOSIS — R25.1 TREMOR: ICD-10-CM

## 2024-01-26 PROCEDURE — 95816 EEG AWAKE AND DROWSY: CPT

## 2024-01-29 ENCOUNTER — TELEPHONE (OUTPATIENT)
Dept: NEUROLOGY | Facility: CLINIC | Age: 79
End: 2024-01-29
Payer: MEDICARE

## 2024-01-29 NOTE — TELEPHONE ENCOUNTER
----- Message from GILBERT Mccormick sent at 1/29/2024 10:21 AM EST -----  The results of her EEG performed last week do not show any evidence of seizure activity.  She should continue taking all of her current medications for secondary stroke prevention and call the office if she has another episode of limb shaking.  Thank you

## 2024-01-29 NOTE — TELEPHONE ENCOUNTER
Caller: Divine Banuelos    Relationship: Self    Best call back number: [phone]    What is the best time to reach you: ANYTIME    Who are you requesting to speak with (clinical staff, provider,  specific staff member): CLINICAL    Do you know the name of the person who called: RYAN    What was the call regarding: PATIENT IS CALLING BACK REGARDING HER EEG RESULTS.

## 2024-01-31 ENCOUNTER — TRANSCRIBE ORDERS (OUTPATIENT)
Dept: GENERAL RADIOLOGY | Facility: HOSPITAL | Age: 79
End: 2024-01-31

## 2024-01-31 DIAGNOSIS — Z12.31 ENCOUNTER FOR SCREENING MAMMOGRAM FOR MALIGNANT NEOPLASM OF BREAST: Primary | ICD-10-CM

## 2024-02-06 ENCOUNTER — TELEPHONE (OUTPATIENT)
Dept: CARDIOLOGY | Facility: CLINIC | Age: 79
End: 2024-02-06
Payer: MEDICARE

## 2024-02-06 NOTE — TELEPHONE ENCOUNTER
Caller: FLORES LUIS    Relationship:SELF    Callback number: [phone]   Is it ok to leave a message: [x] Yes [] No    Requested medication for samples: ELIQUIS 5 MG    How much medication does the patient currently have left: 3 DAYS LEFT    Who will be picking up the samples: PATIENT    Do you need information about patient financial assistance for this medication: [] Yes [x] No    Additional details provided: PT WILLING TO PICK SAMPLES IN [place] IF NEED BE BUT WOULD PREFER TO  IN [place] IF AVAILABLE WITHIN NEXT COUPLE DAYS

## 2024-02-06 NOTE — TELEPHONE ENCOUNTER
Notified of 4 boxes of Eliquis 5 mg will be available for her to  in [facility] with  on 2/8/24. Lot # [id number].Exp Aug 2025.

## 2024-02-07 ENCOUNTER — HOSPITAL ENCOUNTER (OUTPATIENT)
Dept: MAMMOGRAPHY | Facility: HOSPITAL | Age: 79
Discharge: HOME | End: 2024-02-07
Payer: MEDICARE

## 2024-02-07 VITALS — BODY MASS INDEX: 27.46 KG/M2 | WEIGHT: 155 LBS

## 2024-02-07 DIAGNOSIS — Z12.31 ENCOUNTER FOR SCREENING MAMMOGRAM FOR MALIGNANT NEOPLASM OF BREAST: ICD-10-CM

## 2024-02-07 PROCEDURE — 77063 BREAST TOMOSYNTHESIS BI: CPT

## 2024-02-21 RX ORDER — ATORVASTATIN CALCIUM 20 MG/1
20 TABLET, FILM COATED ORAL DAILY
Qty: 90 TABLET | Refills: 3 | Status: SHIPPED | OUTPATIENT
Start: 2024-02-21

## 2024-02-28 ENCOUNTER — HOSPITAL ENCOUNTER (OUTPATIENT)
Facility: HOSPITAL | Age: 79
Discharge: HOME | End: 2024-02-28
Payer: MEDICARE

## 2024-02-28 PROCEDURE — 84443 ASSAY THYROID STIM HORMONE: CPT

## 2024-02-28 PROCEDURE — 85025 COMPLETE CBC W/AUTO DIFF WBC: CPT

## 2024-02-28 PROCEDURE — 82607 VITAMIN B-12: CPT

## 2024-02-28 PROCEDURE — 80053 COMPREHEN METABOLIC PANEL: CPT

## 2024-02-28 PROCEDURE — 83036 HEMOGLOBIN GLYCOSYLATED A1C: CPT

## 2024-02-28 PROCEDURE — 80061 LIPID PANEL: CPT

## 2024-02-28 PROCEDURE — 36415 COLL VENOUS BLD VENIPUNCTURE: CPT

## 2024-02-28 PROCEDURE — 82746 ASSAY OF FOLIC ACID SERUM: CPT

## 2024-02-28 PROCEDURE — 87086 URINE CULTURE/COLONY COUNT: CPT

## 2024-02-28 PROCEDURE — 82306 VITAMIN D 25 HYDROXY: CPT

## 2024-02-29 LAB
25(OH)D3 SERPL-MCNC: 23.7 NG/ML (ref 32–100)
ALBUMIN SERPL-MCNC: 3.9 G/DL (ref 3.4–4.8)
ALBUMIN/GLOB SERPL: 1.6 {RATIO} (ref 0.8–2)
ALP SERPL-CCNC: 151 U/L (ref 25–100)
ALT SERPL-CCNC: 13 U/L (ref 4–36)
ANION GAP SERPL CALCULATED.3IONS-SCNC: 9 MMOL/L (ref 3–16)
AST SERPL-CCNC: 18 U/L (ref 8–33)
BASOPHILS # BLD: 0 K/UL (ref 0–0.1)
BASOPHILS NFR BLD: 0.4 %
BILIRUB SERPL-MCNC: 0.4 MG/DL (ref 0.3–1.2)
BUN SERPL-MCNC: 10 MG/DL (ref 6–20)
CALCIUM SERPL-MCNC: 9.4 MG/DL (ref 8.5–10.5)
CHLORIDE SERPL-SCNC: 106 MMOL/L (ref 98–107)
CHOLEST SERPL-MCNC: 167 MG/DL (ref 0–200)
CO2 SERPL-SCNC: 25 MMOL/L (ref 20–30)
CREAT SERPL-MCNC: 1 MG/DL (ref 0.4–1.2)
EOSINOPHIL # BLD: 0.1 K/UL (ref 0–0.4)
EOSINOPHIL NFR BLD: 2.1 %
ERYTHROCYTE [DISTWIDTH] IN BLOOD BY AUTOMATED COUNT: 14.1 % (ref 11–16)
FOLATE SERPL-MCNC: 11.85 NG/ML
GFR SERPLBLD CREATININE-BSD FMLA CKD-EPI: 57 ML/MIN/{1.73_M2}
GLOBULIN SER CALC-MCNC: 2.4 G/DL
GLUCOSE SERPL-MCNC: 111 MG/DL (ref 74–106)
HBA1C MFR BLD: 5.7 %
HCT VFR BLD AUTO: 42.6 % (ref 37–47)
HDLC SERPL-MCNC: 67 MG/DL (ref 40–60)
HGB BLD-MCNC: 13.5 G/DL (ref 11.5–16.5)
IMM GRANULOCYTES # BLD: 0 K/UL
IMM GRANULOCYTES NFR BLD: 0.1 % (ref 0–5)
LDLC SERPL CALC-MCNC: 81 MG/DL
LYMPHOCYTES # BLD: 1.9 K/UL (ref 1.5–4)
LYMPHOCYTES NFR BLD: 28.4 %
MCH RBC QN AUTO: 28.8 PG (ref 27–32)
MCHC RBC AUTO-ENTMCNC: 31.7 G/DL (ref 31–35)
MCV RBC AUTO: 90.8 FL (ref 80–100)
MONOCYTES # BLD: 0.6 K/UL (ref 0.2–0.8)
MONOCYTES NFR BLD: 9.1 %
NEUTROPHILS # BLD: 4.1 K/UL (ref 2–7.5)
NEUTS SEG NFR BLD: 59.9 %
PLATELET # BLD AUTO: 313 K/UL (ref 150–400)
PMV BLD AUTO: 10.1 FL (ref 6–10)
POTASSIUM SERPL-SCNC: 3.8 MMOL/L (ref 3.4–5.1)
PROT SERPL-MCNC: 6.3 G/DL (ref 6.4–8.3)
RBC # BLD AUTO: 4.69 M/UL (ref 3.8–5.8)
SODIUM SERPL-SCNC: 140 MMOL/L (ref 136–145)
TRIGL SERPL-MCNC: 95 MG/DL (ref 0–249)
TSH SERPL DL<=0.005 MIU/L-ACNC: 3.68 UIU/ML (ref 0.27–4.2)
VIT B12 SERPL-MCNC: 489 PG/ML (ref 211–911)
VLDLC SERPL CALC-MCNC: 19 MG/DL
WBC # BLD AUTO: 6.8 K/UL (ref 4–11)

## 2024-03-01 LAB — BACTERIA UR CULT: NORMAL

## 2024-03-04 DIAGNOSIS — M25.552 ARTHRALGIA OF LEFT HIP: Primary | ICD-10-CM

## 2024-03-05 ENCOUNTER — OFFICE VISIT (OUTPATIENT)
Dept: ORTHOPEDIC SURGERY | Facility: CLINIC | Age: 79
End: 2024-03-05
Payer: MEDICARE

## 2024-03-05 VITALS — WEIGHT: 164 LBS | BODY MASS INDEX: 29.06 KG/M2 | TEMPERATURE: 98.5 F | HEIGHT: 63 IN

## 2024-03-05 DIAGNOSIS — M70.62 GREATER TROCHANTERIC BURSITIS OF LEFT HIP: Primary | ICD-10-CM

## 2024-03-05 PROCEDURE — 99213 OFFICE O/P EST LOW 20 MIN: CPT | Performed by: PHYSICIAN ASSISTANT

## 2024-03-05 PROCEDURE — 1160F RVW MEDS BY RX/DR IN RCRD: CPT | Performed by: PHYSICIAN ASSISTANT

## 2024-03-05 PROCEDURE — 20610 DRAIN/INJ JOINT/BURSA W/O US: CPT | Performed by: PHYSICIAN ASSISTANT

## 2024-03-05 PROCEDURE — 1159F MED LIST DOCD IN RCRD: CPT | Performed by: PHYSICIAN ASSISTANT

## 2024-03-05 RX ORDER — LIDOCAINE HYDROCHLORIDE 20 MG/ML
2 INJECTION, SOLUTION INFILTRATION; PERINEURAL
Status: COMPLETED | OUTPATIENT
Start: 2024-03-05 | End: 2024-03-05

## 2024-03-05 RX ORDER — METHYLPREDNISOLONE ACETATE 40 MG/ML
40 INJECTION, SUSPENSION INTRA-ARTICULAR; INTRALESIONAL; INTRAMUSCULAR; SOFT TISSUE
Status: COMPLETED | OUTPATIENT
Start: 2024-03-05 | End: 2024-03-05

## 2024-03-05 RX ADMIN — METHYLPREDNISOLONE ACETATE 40 MG: 40 INJECTION, SUSPENSION INTRA-ARTICULAR; INTRALESIONAL; INTRAMUSCULAR; SOFT TISSUE at 14:13

## 2024-03-05 RX ADMIN — LIDOCAINE HYDROCHLORIDE 2 ML: 20 INJECTION, SOLUTION INFILTRATION; PERINEURAL at 14:13

## 2024-03-05 NOTE — PROGRESS NOTES
Subjective   Patient ID: Divine Banuelos is a 78 y.o. right hand dominant female is being seen for orthopaedic evaluation today for left hip   Pain of the Left Hip (Reports pain for months, no known injury.)         Pain  Associated symptoms include arthralgias (left hip).     Patient presents with several months of left lateral hip pain. NO injury or trauma.   No numbness or tingling to LLE.  She often reports lying on her left side at night seems to increase her pain.  She denies anterior hip or groin pain.  Denies low back pain.  No radiation of pain    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 in female, estrogen receptor positive 10/30/2020    Mitral valve prolapse     Osteoporosis     Pessary maintenance     Stroke     Wears dentures     upper and lowers        Past Surgical History:   Procedure Laterality Date    BREAST BIOPSY Right 8/17/2022    Procedure: BREAST BIOPSY;  Surgeon: Stuart Caceres MD;  Location: [facility] OR;  Service: General;  Laterality: Right;    BREAST LUMPECTOMY WITH SENTINEL NODE BIOPSY Right 11/4/2020    Procedure: BREAST LUMPECTOMY WITH SENTINEL NODE BIOPSY RIGHT;  Surgeon: Stuart Caceres MD;  Location: [facility] OR;  Service: General;  Laterality: Right;    CATARACT EXTRACTION Bilateral     CATARACT EXTRACTION, BILATERAL      COLONOSCOPY      RETINAL DETACHMENT REPAIR Right        Family History   Problem Relation Age of Onset    Hypertension Mother     Colon cancer Father     Colon cancer Paternal Grandmother     Colon cancer Paternal Uncle     Breast  "cancer Maternal Aunt     Thyroid disease Other         Nephew        Social History     Socioeconomic History    Marital status:    Tobacco Use    Smoking status: Never     Passive exposure: Never    Smokeless tobacco: Never   Vaping Use    Vaping status: Never Used   Substance and Sexual Activity    Alcohol use: No    Drug use: No    Sexual activity: Defer     Birth control/protection: Post-menopausal       No Known Allergies    Review of Systems   Musculoskeletal:  Positive for arthralgias (left hip).       Objective   Temp 98.5 °F (36.9 °C)   Ht 160 cm (62.99\")   Wt 74.4 kg (164 lb)   BMI 29.06 kg/m²   Physical Exam  Vitals and nursing note reviewed.   Constitutional:       Appearance: Normal appearance.   Pulmonary:      Effort: Pulmonary effort is normal.   Musculoskeletal:      Lumbar back: No tenderness or bony tenderness.      Left hip: Tenderness present. No deformity or crepitus. Normal range of motion. Normal strength.   Neurological:      Mental Status: She is alert and oriented to person, place, and time.       Left Hip Exam     Tenderness   The patient is experiencing tenderness in the greater trochanter and lateral.    Range of Motion   Abduction:  40   Flexion:  90     Tests   YAMILET: negative  Rohit: positive  Fadir:  Negative FADIR test    Other   Erythema: absent  Sensation: none  Pulse: present          Extremity DVT signs are negative on physical exam with negative Shawn sign, no calf pain, no palpable cords, and no skin tone change   Neurologic Exam     Mental Status   Oriented to person, place, and time.            Assessment & Plan   Independent Review of Radiographic Studies:    X-ray of the left hip 2 view performed in the clinic independently reviewed for the evaluation of pain.  No comparison films available for review.  No acute fracture or dislocation.      Large Joint Arthrocentesis: L greater trochanteric bursa  Date/Time: 3/5/2024 2:13 PM  Consent given by: patient  Site " marked: site marked  Timeout: Immediately prior to procedure a time out was called to verify the correct patient, procedure, equipment, support staff and site/side marked as required   Supporting Documentation  Indications: pain   Procedure Details  Location: hip - L greater trochanteric bursa  Preparation: Patient was prepped and draped in the usual sterile fashion  Needle size: 22 G (spinal)  Approach: lateral  Medications administered: 40 mg methylPREDNISolone acetate 40 MG/ML; 2 mL lidocaine 2%  Patient tolerance: patient tolerated the procedure well with no immediate complications          Diagnoses and all orders for this visit:    1. Greater trochanteric bursitis of left hip (Primary)    Other orders  -     Large Joint Arthrocentesis: L greater trochanteric bursa       Discussion of orthopedic goals  Orthopedic activities reviewed and patient expressed appreciation  Risk, benefits, and merits of treatment alternatives reviewed with the patient and questions answered  Ice, heat, and/or modalities as beneficial  Call or notify for any adverse effect from injection therapy    Recommendations/Plan:  Patient is encouraged to call or return for any issues or concerns.    Follow up in 3 months  May need PT or advanced imaging if no response to GT bursa injection.      Patient agreeable to call or return sooner for any concerns.

## 2024-03-13 ENCOUNTER — HOSPITAL ENCOUNTER (OUTPATIENT)
Facility: HOSPITAL | Age: 79
Discharge: HOME | End: 2024-03-13
Payer: MEDICARE

## 2024-03-13 PROCEDURE — 87086 URINE CULTURE/COLONY COUNT: CPT

## 2024-03-15 LAB — BACTERIA UR CULT: NORMAL

## 2024-04-15 ENCOUNTER — HOSPITAL ENCOUNTER (OUTPATIENT)
Facility: HOSPITAL | Age: 79
Discharge: HOME | End: 2024-04-15
Payer: MEDICARE

## 2024-04-15 PROCEDURE — 87186 SC STD MICRODIL/AGAR DIL: CPT

## 2024-04-15 PROCEDURE — 87086 URINE CULTURE/COLONY COUNT: CPT

## 2024-04-15 PROCEDURE — 87077 CULTURE AEROBIC IDENTIFY: CPT

## 2024-04-18 LAB
BACTERIA UR CULT: ABNORMAL
ORGANISM: ABNORMAL

## 2024-05-13 ENCOUNTER — HOSPITAL ENCOUNTER (EMERGENCY)
Facility: HOSPITAL | Age: 79
Discharge: HOME | End: 2024-05-13
Attending: STUDENT IN AN ORGANIZED HEALTH CARE EDUCATION/TRAINING PROGRAM | Admitting: STUDENT IN AN ORGANIZED HEALTH CARE EDUCATION/TRAINING PROGRAM
Payer: MEDICARE

## 2024-05-13 ENCOUNTER — APPOINTMENT (OUTPATIENT)
Dept: GENERAL RADIOLOGY | Facility: HOSPITAL | Age: 79
End: 2024-05-13
Payer: MEDICARE

## 2024-05-13 VITALS
DIASTOLIC BLOOD PRESSURE: 73 MMHG | HEIGHT: 63 IN | SYSTOLIC BLOOD PRESSURE: 132 MMHG | WEIGHT: 156 LBS | TEMPERATURE: 97.9 F | HEART RATE: 64 BPM | RESPIRATION RATE: 18 BRPM | OXYGEN SATURATION: 96 % | BODY MASS INDEX: 27.64 KG/M2

## 2024-05-13 DIAGNOSIS — M79.674 PAIN OF TOE OF RIGHT FOOT: Primary | ICD-10-CM

## 2024-05-13 DIAGNOSIS — B35.1 ONYCHOMYCOSIS: ICD-10-CM

## 2024-05-13 PROCEDURE — 73630 X-RAY EXAM OF FOOT: CPT

## 2024-05-13 PROCEDURE — 99283 EMERGENCY DEPT VISIT LOW MDM: CPT

## 2024-05-13 RX ORDER — ERGOCALCIFEROL 1.25 MG/1
1 CAPSULE ORAL WEEKLY
COMMUNITY
Start: 2024-03-13

## 2024-05-13 NOTE — ED PROVIDER NOTES
EMERGENCY DEPARTMENT ENCOUNTER    Pt Name: Divine Banuelos  MRN: [id number]  Pt :   1945  Room Number:  BELLA/BELLA  Date of encounter:  2024  PCP: Queenie Amezcua APRN  ED Provider: Geremias Castro MD    Historian: Patient      HPI:  Chief Complaint: Toe pain        Context: Divine Banuelos is a 78 y.o. female who presents to the ED for right toe pain.  Pain has been ongoing for the past several days.  Localized mostly to the fourth and fifth toes.  No fevers or chills.  States had a corn that was removed by podiatry.  No swelling but states lateral portion of her foot became red over the past day or 2 but has since resolved.  No fevers.  Ambulatory on affected foot.      PAST MEDICAL HISTORY  Past Medical History:   Diagnosis Date    Anxiety     Arthritis     Atrial fibrillation     Bell's palsy     Bronchitis     Cataract     bilateral    Depression     Diastolic heart failure     srr Dr Cope note    GERD (gastroesophageal reflux disease)     History of exercise stress test     patient states it was normal    Hyperlipidemia     Hypertension     not currently taking medication for BP    Hypothyroidism     Malignant neoplasm of upper-outer quadrant of right breast in female, estrogen receptor positive 10/30/2020    Mitral valve prolapse     Osteoporosis     Pessary maintenance     Stroke     Wears dentures     upper and lowers         PAST SURGICAL HISTORY  Past Surgical History:   Procedure Laterality Date    BREAST BIOPSY Right 2022    Procedure: BREAST BIOPSY;  Surgeon: Stuart Caceres MD;  Location: [facility];  Service: General;  Laterality: Right;    BREAST LUMPECTOMY WITH SENTINEL NODE BIOPSY Right 2020    Procedure: BREAST LUMPECTOMY WITH SENTINEL NODE BIOPSY RIGHT;  Surgeon: Stuart Caceres MD;  Location: [facility];  Service: General;  Laterality: Right;    CATARACT EXTRACTION Bilateral     CATARACT EXTRACTION, BILATERAL      COLONOSCOPY      RETINAL DETACHMENT REPAIR Right           FAMILY HISTORY  Family History   Problem Relation Age of Onset    Hypertension Mother     Colon cancer Father     Colon cancer Paternal Grandmother     Colon cancer Paternal Uncle     Breast cancer Maternal Aunt     Thyroid disease Other         Nephew         SOCIAL HISTORY  Social History     Socioeconomic History    Marital status:    Tobacco Use    Smoking status: Never     Passive exposure: Never    Smokeless tobacco: Never   Vaping Use    Vaping status: Never Used   Substance and Sexual Activity    Alcohol use: No    Drug use: No    Sexual activity: Defer     Birth control/protection: Post-menopausal         ALLERGIES  Patient has no known allergies.        REVIEW OF SYSTEMS  Systems reviewed and negative      PHYSICAL EXAM    I have reviewed the triage vital signs and nursing notes.    ED Triage Vitals [05/13/24 1814]   Temp Heart Rate Resp BP SpO2   97.9 °F (36.6 °C) 84 18 (!) 186/91 99 %      Temp src Heart Rate Source Patient Position BP Location FiO2 (%)   Oral Monitor Sitting Right arm --       Physical Exam  GENERAL:   Appears in no acute distress.   HENT: Nares patent.  EYES: No scleral icterus.  CV: Regular rhythm, regular rate.  RESPIRATORY: Normal effort.  ABDOMEN: Soft, nontender  MUSCULOSKELETAL: No deformities.   NEURO: Alert, moves all extremities, follows commands.  SKIN: Warm, dry, no rash visualized. No redness to right foot. Good distal DP/PT pulses.       LAB RESULTS  No results found for this or any previous visit (from the past 24 hour(s)).    If labs were ordered, I independently reviewed the results and considered them in treating the patient.        RADIOLOGY  No Radiology Exams Resulted Within Past 24 Hours        PROCEDURES    Procedures    No orders to display       MEDICATIONS GIVEN IN ER    Medications - No data to display      MEDICAL DECISION MAKING, PROGRESS, and CONSULTS    All labs, if obtained, have been independently reviewed by me.  All radiology studies,  if obtained, have been reviewed by me and the radiologist dictating the report.  All EKG's, if obtained, have been independently viewed and interpreted by me/my attending physician.      Discussion below represents my analysis of pertinent findings related to patient's condition, differential diagnosis, treatment plan and final disposition.                         Differential diagnosis:    Ulcer, cellulitis, fracture, abscess, osteomyelitis, others      Additional sources:    - Discussed/ obtained information from independent historians:      - External (non-ED) record review: Orthopedic progress note 3/5/2024    - Chronic or social conditions impacting care:      - Shared decision making:        Orders placed during this visit:  Orders Placed This Encounter   Procedures    XR Foot 3+ View Right    Obtain & Apply The Following- Lower extremity; Post-op shoe         Additional orders considered but not ordered:      ED Course:    Patient is a 78-year-old female presenting for the evaluation of right-sided foot pain mostly lateral fourth and fifth toes.  She had a corn removed at this location by podiatry.  There are no secondary findings of infection she is afebrile here in the department.  X-ray shows no obvious acute fracture on my interpretation of imaging.  No bony erosions to suggest osteomyelitis.  Patient concerned about her toenails.  Appearance suggests onychomycosis.  No evidence for acute process.  Hard soled shoe for comfort.  Recommendation for close podiatry follow-up.                Consultants:      Shared Decision Making:  After my consideration of clinical presentation and any laboratory/radiology studies obtained, I discussed the findings with the patient/patient representative who is in agreement with the treatment plan and the final disposition.   Risks and benefits of discharge and/or observation/admission were discussed.       AS OF 06:46 EDT VITALS:    BP - 132/73  HR - 64  TEMP - 97.9 °F  (36.6 °C) (Oral)  O2 SATS - 96%                  DIAGNOSIS  Final diagnoses:   Onychomycosis   Pain of toe of right foot         DISPOSITION  ED Disposition       ED Disposition   Discharge    Condition   Stable    Comment   --                   Please note that portions of this document were completed with voice recognition software.        Geremias Castro MD  05/17/24 0636

## 2024-05-14 NOTE — DISCHARGE INSTRUCTIONS
Please follow-up with your podiatrist to monitor your symptoms and determine need for further testing for onychomycosis (KOH prep, terbinafine, etc.)  Wear post op shoe for comfort

## 2024-07-02 ENCOUNTER — HOSPITAL ENCOUNTER (OUTPATIENT)
Facility: HOSPITAL | Age: 79
Discharge: HOME | End: 2024-07-02
Payer: MEDICARE

## 2024-07-02 PROCEDURE — 87086 URINE CULTURE/COLONY COUNT: CPT

## 2024-07-04 LAB — BACTERIA UR CULT: NORMAL

## 2024-08-08 ENCOUNTER — HOSPITAL ENCOUNTER (OUTPATIENT)
Facility: HOSPITAL | Age: 79
Discharge: HOME | End: 2024-08-08
Payer: MEDICARE

## 2024-08-08 LAB — SARS-COV-2 RDRP RESP QL NAA+PROBE: NOT DETECTED

## 2024-08-08 PROCEDURE — 87635 SARS-COV-2 COVID-19 AMP PRB: CPT

## 2024-08-20 NOTE — PROGRESS NOTES
"Patient: Divine Banuelos  YOB: 1945    Date: 08/26/2024    Primary Care Provider: Queenie Amezcua APRN    Chief Complaint   Patient presents with    Follow-up     1 yr breast        History: The patient is in the office today for a follow up on her right breast. She had a right breast lumpectomy in 2022. Last mammogram was normal.  She states she has no complaints at this time.  Also indicates no recurrent cyst or mass.  Mammogram also negative.    The following portions of the patient's history were reviewed and updated as appropriate: allergies, current medications, past family history, past medical history, past social history, past surgical history and problem list.    Review of Systems   Constitutional:  Negative for chills, fever and unexpected weight change.   HENT:  Negative for hearing loss, trouble swallowing and voice change.    Eyes:  Negative for visual disturbance.   Respiratory:  Negative for apnea, cough, chest tightness, shortness of breath and wheezing.    Cardiovascular:  Negative for chest pain, palpitations and leg swelling.   Gastrointestinal:  Negative for abdominal distention, abdominal pain, anal bleeding, blood in stool, constipation, diarrhea, nausea, rectal pain and vomiting.   Endocrine: Negative for cold intolerance and heat intolerance.   Genitourinary:  Negative for difficulty urinating, dysuria and flank pain.   Musculoskeletal:  Negative for back pain and gait problem.   Skin:  Negative for color change, rash and wound.   Neurological:  Negative for dizziness, syncope, speech difficulty, weakness, light-headedness, numbness and headaches.   Hematological:  Negative for adenopathy. Does not bruise/bleed easily.   Psychiatric/Behavioral:  Negative for confusion. The patient is not nervous/anxious.        Vital Signs  Vitals:    08/26/24 1236   BP: 136/78   Pulse: 61   Temp: 97.8 °F (36.6 °C)   SpO2: 98%   Weight: 71.7 kg (158 lb)   Height: 160 cm (62.99\") "       Allergies:  No Known Allergies    Medications:    Current Outpatient Medications:     acetaminophen (TYLENOL) 500 MG tablet, Take 0.5 tablets by mouth Every 6 (Six) Hours As Needed for Mild Pain., Disp: , Rfl:     apixaban (Eliquis) 5 MG tablet tablet, Take 1 tablet by mouth Every 12 (Twelve) Hours., Disp: 60 tablet, Rfl: 11    atorvastatin (LIPITOR) 20 MG tablet, TAKE 1 TABLET BY MOUTH DAILY, Disp: 90 tablet, Rfl: 3    Calcium Citrate (CITRACAL PO), Take 2 tablets by mouth Daily. + Magnesium, Disp: , Rfl:     cetirizine (zyrTEC) 10 MG tablet, Take 1 tablet by mouth Daily., Disp: , Rfl:     estradiol (ESTRACE VAGINAL) 0.1 MG/GM vaginal cream, Massage pea size amount into vaginal opening twice weekly, Disp: 42.5 g, Rfl: 1    hydroCHLOROthiazide 12.5 MG tablet, TAKE 1 TABLET BY MOUTH DAILY AS NEEDED FOR LOWER EXTREMITY SWELLING, Disp: , Rfl:     ibuprofen (ADVIL,MOTRIN) 800 MG tablet, , Disp: , Rfl:     ipratropium (ATROVENT) 0.06 % nasal spray, 1 spray into the nostril(s) as directed by provider As Needed., Disp: , Rfl:     levothyroxine (SYNTHROID, LEVOTHROID) 50 MCG tablet, Take 1 tablet by mouth Daily., Disp: , Rfl:     Misc Natural Products (OSTEO BI-FLEX TRIPLE STRENGTH PO), Take 1 tablet by mouth 2 (Two) Times a Day., Disp: , Rfl:     PARoxetine (PAXIL) 10 MG tablet, Take 1 tablet by mouth Every Morning., Disp: , Rfl:     ZINC SULFATE PO, Take 1 tablet by mouth Daily., Disp: , Rfl:     buPROPion XL (WELLBUTRIN XL) 150 MG 24 hr tablet, Take 1 tablet by mouth Daily. (Patient not taking: Reported on 8/26/2024), Disp: , Rfl:     metoprolol tartrate (LOPRESSOR) 25 MG tablet, Take 0.25 tablets by mouth As Needed (Sustained HR > 130 over 1 hour). Patient is to take 6.25 mg every 4-6 hours prn for sustained HR > 130 over 1 hour- Please divide tablet if possible prior to patient picking up. (Patient not taking: Reported on 12/15/2023), Disp: 10 tablet, Rfl: 0    prazosin (MINIPRESS) 1 MG capsule, Take 1 capsule by  mouth every night at bedtime. (Patient not taking: Reported on 8/26/2024), Disp: , Rfl:     vitamin D (ERGOCALCIFEROL) 1.25 MG (69756 UT) capsule capsule, Take 1 capsule by mouth 1 (One) Time Per Week. (Patient not taking: Reported on 8/26/2024), Disp: , Rfl:     Physical Exam:    Breast-no palpable mass in right breast or axilla     Results Review:   I reviewed the patient's new clinical results.     Review of Systems was reviewed and confirmed as accurate as documented by the MA.    ASSESSMENT/PLAN:    1. History of breast cancer    No recurrent right breast cancer.  Patient reassured will follow-up in 1 year for repeat ultrasound and mammogram will be scheduled in 1 year as well.      Electronically signed by Stuart Caceres MD  08/26/24

## 2024-08-26 ENCOUNTER — OFFICE VISIT (OUTPATIENT)
Dept: SURGERY | Facility: CLINIC | Age: 79
End: 2024-08-26
Payer: MEDICARE

## 2024-08-26 VITALS
SYSTOLIC BLOOD PRESSURE: 136 MMHG | WEIGHT: 158 LBS | TEMPERATURE: 97.8 F | BODY MASS INDEX: 28 KG/M2 | DIASTOLIC BLOOD PRESSURE: 78 MMHG | OXYGEN SATURATION: 98 % | HEIGHT: 63 IN | HEART RATE: 61 BPM

## 2024-08-26 DIAGNOSIS — Z85.3 HISTORY OF BREAST CANCER: Primary | ICD-10-CM

## 2024-08-26 PROCEDURE — 99212 OFFICE O/P EST SF 10 MIN: CPT | Performed by: SURGERY

## 2024-08-26 PROCEDURE — 1159F MED LIST DOCD IN RCRD: CPT | Performed by: SURGERY

## 2024-08-26 PROCEDURE — 3075F SYST BP GE 130 - 139MM HG: CPT | Performed by: SURGERY

## 2024-08-26 PROCEDURE — 3078F DIAST BP <80 MM HG: CPT | Performed by: SURGERY

## 2024-08-26 PROCEDURE — 1160F RVW MEDS BY RX/DR IN RCRD: CPT | Performed by: SURGERY

## 2024-08-26 RX ORDER — IBUPROFEN 800 MG/1
TABLET, FILM COATED ORAL
COMMUNITY

## 2024-08-26 RX ORDER — CETIRIZINE HYDROCHLORIDE 10 MG/1
10 TABLET ORAL DAILY
COMMUNITY

## 2024-08-26 RX ORDER — PRAZOSIN HYDROCHLORIDE 1 MG/1
1 CAPSULE ORAL
COMMUNITY

## 2024-08-26 RX ORDER — HYDROCHLOROTHIAZIDE 12.5 MG/1
TABLET ORAL
COMMUNITY
Start: 2024-07-02

## 2024-09-04 ENCOUNTER — HOSPITAL ENCOUNTER (OUTPATIENT)
Facility: HOSPITAL | Age: 79
Discharge: HOME | End: 2024-09-04
Payer: MEDICARE

## 2024-09-04 LAB
25(OH)D3 SERPL-MCNC: 39.1 NG/ML (ref 32–100)
ALBUMIN SERPL-MCNC: 4.3 G/DL (ref 3.4–4.8)
ALBUMIN/GLOB SERPL: 1.6 {RATIO} (ref 0.8–2)
ALP SERPL-CCNC: 151 U/L (ref 25–100)
ALT SERPL-CCNC: 11 U/L (ref 4–36)
ANION GAP SERPL CALCULATED.3IONS-SCNC: 12 MMOL/L (ref 3–16)
AST SERPL-CCNC: 19 U/L (ref 8–33)
BASOPHILS # BLD: 0 K/UL (ref 0–0.1)
BASOPHILS NFR BLD: 0.6 %
BILIRUB SERPL-MCNC: 0.7 MG/DL (ref 0.3–1.2)
BUN SERPL-MCNC: 13 MG/DL (ref 6–20)
CALCIUM SERPL-MCNC: 10 MG/DL (ref 8.5–10.5)
CHLORIDE SERPL-SCNC: 105 MMOL/L (ref 98–107)
CHOLEST SERPL-MCNC: 158 MG/DL (ref 0–200)
CO2 SERPL-SCNC: 27 MMOL/L (ref 20–30)
CREAT SERPL-MCNC: 0.9 MG/DL (ref 0.4–1.2)
EOSINOPHIL # BLD: 0.1 K/UL (ref 0–0.4)
EOSINOPHIL NFR BLD: 1.6 %
ERYTHROCYTE [DISTWIDTH] IN BLOOD BY AUTOMATED COUNT: 13.6 % (ref 11–16)
FOLATE SERPL-MCNC: 10.86 NG/ML
GFR SERPLBLD CREATININE-BSD FMLA CKD-EPI: 65 ML/MIN/{1.73_M2}
GLOBULIN SER CALC-MCNC: 2.7 G/DL
GLUCOSE SERPL-MCNC: 90 MG/DL (ref 74–106)
HBA1C MFR BLD: 5.8 %
HCT VFR BLD AUTO: 44.3 % (ref 37–47)
HDLC SERPL-MCNC: 63 MG/DL (ref 40–60)
HGB BLD-MCNC: 13.9 G/DL (ref 11.5–16.5)
IMM GRANULOCYTES # BLD: 0 K/UL
IMM GRANULOCYTES NFR BLD: 0.3 % (ref 0–5)
LDLC SERPL CALC-MCNC: 82 MG/DL
LYMPHOCYTES # BLD: 1.6 K/UL (ref 1.5–4)
LYMPHOCYTES NFR BLD: 24.1 %
MCH RBC QN AUTO: 28.5 PG (ref 27–32)
MCHC RBC AUTO-ENTMCNC: 31.4 G/DL (ref 31–35)
MCV RBC AUTO: 90.8 FL (ref 80–100)
MONOCYTES # BLD: 0.5 K/UL (ref 0.2–0.8)
MONOCYTES NFR BLD: 7.3 %
NEUTROPHILS # BLD: 4.5 K/UL (ref 2–7.5)
NEUTS SEG NFR BLD: 66.1 %
PLATELET # BLD AUTO: 284 K/UL (ref 150–400)
PMV BLD AUTO: 10.1 FL (ref 6–10)
POTASSIUM SERPL-SCNC: 4.3 MMOL/L (ref 3.4–5.1)
PROT SERPL-MCNC: 7 G/DL (ref 6.4–8.3)
RBC # BLD AUTO: 4.88 M/UL (ref 3.8–5.8)
SODIUM SERPL-SCNC: 144 MMOL/L (ref 136–145)
TRIGL SERPL-MCNC: 67 MG/DL (ref 0–249)
TSH SERPL DL<=0.005 MIU/L-ACNC: 2.15 UIU/ML (ref 0.27–4.2)
VIT B12 SERPL-MCNC: 413 PG/ML (ref 211–911)
VLDLC SERPL CALC-MCNC: 13 MG/DL
WBC # BLD AUTO: 6.8 K/UL (ref 4–11)

## 2024-09-04 PROCEDURE — 82306 VITAMIN D 25 HYDROXY: CPT

## 2024-09-04 PROCEDURE — 83036 HEMOGLOBIN GLYCOSYLATED A1C: CPT

## 2024-09-04 PROCEDURE — 80061 LIPID PANEL: CPT

## 2024-09-04 PROCEDURE — 87086 URINE CULTURE/COLONY COUNT: CPT

## 2024-09-04 PROCEDURE — 82607 VITAMIN B-12: CPT

## 2024-09-04 PROCEDURE — 80053 COMPREHEN METABOLIC PANEL: CPT

## 2024-09-04 PROCEDURE — 36415 COLL VENOUS BLD VENIPUNCTURE: CPT

## 2024-09-04 PROCEDURE — 84443 ASSAY THYROID STIM HORMONE: CPT

## 2024-09-04 PROCEDURE — 82746 ASSAY OF FOLIC ACID SERUM: CPT

## 2024-09-04 PROCEDURE — 85025 COMPLETE CBC W/AUTO DIFF WBC: CPT

## 2024-09-05 LAB — BACTERIA UR CULT: NORMAL

## 2024-09-16 ENCOUNTER — HOSPITAL ENCOUNTER (OUTPATIENT)
Dept: ULTRASOUND IMAGING | Facility: HOSPITAL | Age: 79
Discharge: HOME | End: 2024-09-16
Payer: MEDICARE

## 2024-09-16 DIAGNOSIS — R74.01 ELEVATION OF LEVELS OF LIVER TRANSAMINASE LEVELS: ICD-10-CM

## 2024-09-16 PROCEDURE — 76705 ECHO EXAM OF ABDOMEN: CPT

## 2024-10-01 ENCOUNTER — HOSPITAL ENCOUNTER (OUTPATIENT)
Facility: HOSPITAL | Age: 79
Discharge: HOME | End: 2024-10-01
Payer: MEDICARE

## 2024-10-01 PROCEDURE — 87086 URINE CULTURE/COLONY COUNT: CPT

## 2024-10-03 LAB — BACTERIA UR CULT: NORMAL

## 2024-11-11 ENCOUNTER — OFFICE VISIT (OUTPATIENT)
Dept: UROLOGY | Facility: CLINIC | Age: 79
End: 2024-11-11
Payer: MEDICARE

## 2024-11-11 VITALS
BODY MASS INDEX: 28.16 KG/M2 | DIASTOLIC BLOOD PRESSURE: 76 MMHG | TEMPERATURE: 98.5 F | RESPIRATION RATE: 16 BRPM | WEIGHT: 153 LBS | HEART RATE: 65 BPM | SYSTOLIC BLOOD PRESSURE: 130 MMHG | OXYGEN SATURATION: 100 % | HEIGHT: 62 IN

## 2024-11-11 DIAGNOSIS — N95.8 GENITOURINARY SYNDROME OF MENOPAUSE: Primary | ICD-10-CM

## 2024-11-11 DIAGNOSIS — N81.11 CYSTOCELE, MIDLINE: ICD-10-CM

## 2024-11-11 PROBLEM — M20.41 ACQUIRED HAMMER TOE OF RIGHT FOOT: Status: ACTIVE | Noted: 2024-04-28

## 2024-11-11 PROBLEM — M79.671 PAIN IN RIGHT FOOT: Status: ACTIVE | Noted: 2024-04-24

## 2024-11-11 LAB
BILIRUB BLD-MCNC: NEGATIVE MG/DL
CLARITY, POC: CLEAR
COLOR UR: YELLOW
EXPIRATION DATE: ABNORMAL
GLUCOSE UR STRIP-MCNC: NEGATIVE MG/DL
KETONES UR QL: NEGATIVE
LEUKOCYTE EST, POC: ABNORMAL
Lab: ABNORMAL
NITRITE UR-MCNC: POSITIVE MG/ML
PH UR: 5.5 [PH] (ref 5–8)
PROT UR STRIP-MCNC: NEGATIVE MG/DL
RBC # UR STRIP: NEGATIVE /UL
SP GR UR: 1.02 (ref 1–1.03)
UROBILINOGEN UR QL: NORMAL

## 2024-11-11 RX ORDER — NITROFURANTOIN 25; 75 MG/1; MG/1
100 CAPSULE ORAL 2 TIMES DAILY
Qty: 14 CAPSULE | Refills: 0 | Status: SHIPPED | OUTPATIENT
Start: 2024-11-11

## 2024-11-11 RX ORDER — ESTRADIOL 0.1 MG/G
CREAM VAGINAL
Qty: 42.5 G | Refills: 3 | Status: SHIPPED | OUTPATIENT
Start: 2024-11-11

## 2024-11-11 NOTE — PROGRESS NOTES
Office Visit UTI Recurrent/ IC     Patient Name: Divine Banuelos  : 1945   MRN: [id number]     Chief Complaint: History of Frequent UTI.    Chief Complaint   Patient presents with    Dysuria    Urinary Tract Infection       Referring Provider: Elise Lee APRN    History of Present Illness: Divine Banuelos is a 79 y.o. female who presents today for history of multiple UTIs.    Patient reports approximately monthly but none in the last 2 months infections in the last 6 months.  She was using perfumed wipes and she stopped using them and not had any since      UTI symptoms include does not have burning but she gets confusion   Patient Denies current Sx  Her symptoms resolve promptly when antibiotics are initiated for an episode thought to be an infection.    No History of inpatient hospitalized for these infections?    No Records of positive urine cultures?  No History of gross hematuria?   No       Burning pain in bladder   No Relationship with the onset of these infections and sexual activity?  no Use of barrier contraception or a spermicidal products?  No       Pain with intercourse   No       Pelvic Pain   No       Vaginal dryness?   No Ongoing problems with constipation?                  Water intake 1 bottle  Soda/coffee/tea intake 1 cup decaf coffee    Frequency 3 hours  Urgency   strong   Nocturia 1  Leaks occasionally  Pads No    Subjective      Review of System:   As noted in HPI      Past Medical History:   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 in  female, estrogen receptor positive 10/30/2020    Mitral valve prolapse     Osteoporosis     Pessary maintenance     Stroke     Wears dentures     upper and lowers       Past Surgical History:   Past Surgical History:   Procedure Laterality Date    BREAST BIOPSY Right 8/17/2022    Procedure: BREAST BIOPSY;  Surgeon: Stuart Caceres MD;  Location: [facility];  Service: General;  Laterality: Right;    BREAST LUMPECTOMY WITH SENTINEL NODE BIOPSY Right 11/4/2020    Procedure: BREAST LUMPECTOMY WITH SENTINEL NODE BIOPSY RIGHT;  Surgeon: Stuart Caceres MD;  Location: [facility];  Service: General;  Laterality: Right;    CATARACT EXTRACTION Bilateral     CATARACT EXTRACTION, BILATERAL      COLONOSCOPY      RETINAL DETACHMENT REPAIR Right        Family History:   Family History   Problem Relation Age of Onset    Hypertension Mother     Colon cancer Father     Colon cancer Paternal Grandmother     Colon cancer Paternal Uncle     Breast cancer Maternal Aunt     Thyroid disease Other         Nephew       Social History:   Social History     Socioeconomic History    Marital status:    Tobacco Use    Smoking status: Never     Passive exposure: Never    Smokeless tobacco: Never   Vaping Use    Vaping status: Never Used   Substance and Sexual Activity    Alcohol use: No    Drug use: No    Sexual activity: Defer     Birth control/protection: Post-menopausal       Medications:     Current Outpatient Medications:     apixaban (Eliquis) 5 MG tablet tablet, Take 1 tablet by mouth Every 12 (Twelve) Hours., Disp: 60 tablet, Rfl: 11    atorvastatin (LIPITOR) 20 MG tablet, TAKE 1 TABLET BY MOUTH DAILY, Disp: 90 tablet, Rfl: 3    Calcium Citrate (CITRACAL PO), Take 2 tablets by mouth Daily. + Magnesium, Disp: , Rfl:     PARoxetine (PAXIL) 10 MG tablet, Take 1 tablet by mouth Every Morning., Disp: , Rfl:     vitamin D (ERGOCALCIFEROL) 1.25 MG (03532 UT) capsule capsule, Take 1 capsule by mouth 1 (One) Time Per Week., Disp: , Rfl:      ZINC SULFATE PO, Take 1 tablet by mouth Daily., Disp: , Rfl:     acetaminophen (TYLENOL) 500 MG tablet, Take 0.5 tablets by mouth Every 6 (Six) Hours As Needed for Mild Pain. (Patient not taking: Reported on 11/11/2024), Disp: , Rfl:     buPROPion XL (WELLBUTRIN XL) 150 MG 24 hr tablet, Take 1 tablet by mouth Daily. (Patient not taking: Reported on 11/11/2024), Disp: , Rfl:     cetirizine (zyrTEC) 10 MG tablet, Take 1 tablet by mouth Daily. (Patient not taking: Reported on 11/11/2024), Disp: , Rfl:     estradiol (ESTRACE) 0.1 MG/GM vaginal cream, Apply 0.5 g vaginally 3 times weekly at bedtime (Monday, Wednesday, and Friday), Disp: 42.5 g, Rfl: 3    hydroCHLOROthiazide 12.5 MG tablet, TAKE 1 TABLET BY MOUTH DAILY AS NEEDED FOR LOWER EXTREMITY SWELLING (Patient not taking: Reported on 11/11/2024), Disp: , Rfl:     ibuprofen (ADVIL,MOTRIN) 800 MG tablet, , Disp: , Rfl:     ipratropium (ATROVENT) 0.06 % nasal spray, 1 spray into the nostril(s) as directed by provider As Needed. (Patient not taking: Reported on 11/11/2024), Disp: , Rfl:     levothyroxine (SYNTHROID, LEVOTHROID) 50 MCG tablet, Take 1 tablet by mouth Daily. (Patient not taking: Reported on 11/11/2024), Disp: , Rfl:     metoprolol tartrate (LOPRESSOR) 25 MG tablet, Take 0.25 tablets by mouth As Needed (Sustained HR > 130 over 1 hour). Patient is to take 6.25 mg every 4-6 hours prn for sustained HR > 130 over 1 hour- Please divide tablet if possible prior to patient picking up. (Patient not taking: Reported on 11/11/2024), Disp: 10 tablet, Rfl: 0    Misc Natural Products (OSTEO BI-FLEX TRIPLE STRENGTH PO), Take 1 tablet by mouth 2 (Two) Times a Day. (Patient not taking: Reported on 11/11/2024), Disp: , Rfl:     nitrofurantoin, macrocrystal-monohydrate, (Macrobid) 100 MG capsule, Take 1 capsule by mouth 2 (Two) Times a Day., Disp: 14 capsule, Rfl: 0    prazosin (MINIPRESS) 1 MG capsule, Take 1 capsule by mouth every night at bedtime. (Patient not taking:  "Reported on 11/11/2024), Disp: , Rfl:     Allergies:   No Known Allergies    Objective     Physical Exam:   Vital Signs:   Vitals:    11/11/24 1321   BP: 130/76   Pulse: 65   Resp: 16   Temp: 98.5 °F (36.9 °C)   SpO2: 100%   Weight: 69.4 kg (153 lb)   Height: 157.5 cm (62\")     Body mass index is 27.98 kg/m².     Physical Exam     Physical Exam  Vitals and nursing note reviewed. Exam conducted with a chaperone present.   Constitutional:       General: She is not in acute distress.     Appearance: Normal appearance. She is well-groomed and overweight. She is not ill-appearing.   Pulmonary:      Effort: Pulmonary effort is normal. No respiratory distress.   Genitourinary:     Comments: Vulva thin pale red, dry to touch, grade 2 cystocele no notable recotcele  Skin:     General: Skin is warm and dry.   Neurological:      General: No focal deficit present.      Mental Status: She is alert and oriented to person, place, and time.   Psychiatric:         Mood and Affect: Mood normal.         Behavior: Behavior normal.          Labs:   Brief Urine Lab Results       None                 Radiographic Studies  US Gallbladder    Result Date: 9/16/2024  IMPRESSION : Unremarkable RUQ ultrasound Electronically signed by Barron Blue    US breast right limited    Result Date: 8/27/2024  Postsurgical changes right breast, otherwise negative       Post-Void Residual  A post-void residual was measured by ultrasonic bladder scanner by staff.  0ml    Assessment / Plan      Diagnoses and all orders for this visit:    1. Genitourinary syndrome of menopause (Primary)  -     estradiol (ESTRACE) 0.1 MG/GM vaginal cream; Apply 0.5 g vaginally 3 times weekly at bedtime (Monday, Wednesday, and Friday)  Dispense: 42.5 g; Refill: 3  -     nitrofurantoin, macrocrystal-monohydrate, (Macrobid) 100 MG capsule; Take 1 capsule by mouth 2 (Two) Times a Day.  Dispense: 14 capsule; Refill: 0    2. Cystocele, midline  -     US Renal Bilateral; " Future         Assessment  Vin is a 79 y.o. female who presents with Dysuria and cystocele.    The patient's risk factors to developing recurrent UTIs include cystocele, post menopausal and low fluid intake.  We discussed GSM and the recommendation for topical estradiol for prevention of UTIs and improving tissue where she has a cystocele. We discussed she does not use a pessary for her grade 2 cystocele will monitor for hydronephrosis and PVR to make sure bladder is empty.    Plan  1. Encourage fluid consumption at the onset of a symptomatic UTI.  2. Various treatment strategies were discussed with the patient including antibiotics in the form of on-demand, post-coital and suppressive daily dosing. Macrobid 2 times daily for 7 days on demand sent due to positive nitrites  As she is post-menopausal we also discussed topical vaginal estrogen cream, start 3 nights weekly  3.  Renal ultrasound prior to next visit to monitor for hydronephrosis due to cystocele  4. Start cranberry supplement daily    Follow Up:   Return in about 3 months (around 2/11/2025) for Follow up GILBERT Velazquez, NP-C  [facility]

## 2024-11-14 ENCOUNTER — OFFICE VISIT (OUTPATIENT)
Dept: CARDIOLOGY | Facility: CLINIC | Age: 79
End: 2024-11-14
Payer: MEDICARE

## 2024-11-14 VITALS
BODY MASS INDEX: 27.82 KG/M2 | DIASTOLIC BLOOD PRESSURE: 64 MMHG | OXYGEN SATURATION: 98 % | HEIGHT: 63 IN | HEART RATE: 90 BPM | SYSTOLIC BLOOD PRESSURE: 144 MMHG | WEIGHT: 157 LBS

## 2024-11-14 DIAGNOSIS — I50.32 DIASTOLIC DYSFUNCTION WITH CHRONIC HEART FAILURE: ICD-10-CM

## 2024-11-14 DIAGNOSIS — I10 PRIMARY HYPERTENSION: ICD-10-CM

## 2024-11-14 DIAGNOSIS — I48.0 PAROXYSMAL ATRIAL FIBRILLATION: Primary | ICD-10-CM

## 2024-11-14 NOTE — PROGRESS NOTES
[facility] at [facility]  Office visit  Divine Banuelos  1945  [phone]    VISIT DATE:  11/14/2024      PCP: Queenie Amezcua, APRN  [address] [place] [id number]    CC:  Chief Complaint   Patient presents with    Diastolic dysfunction with chronic heart failure       PROBLEM LIST:  Hypertension:   History of elevated blood pressure due to stress.   Discontinuation of enalapril secondary to hypotension, June 2013.  Bradycardia-Holter October 2017: Average heart rate 52 bpm, range of 36-94 bpm. Asymptomatic  Hypothyroidism.  Depression.  Anxiety.  Osteoporosis, mild.  Mild dyslipidemia.  History of Bell’s palsy.  Surgical history:  Bilateral cataract extraction.    Cardiac studies and procedures:  September 2019  Echo  Estimated EF appears to be in the range of 56 - 60%.  Left ventricular diastolic dysfunction (grade II) consistent with pseudonormalization.  Left atrial cavity size is borderline dilated.  Mild mitral valve regurgitation is present  Exercise myocardial perfusion imaging  A stress test was performed following the Fredis protocol.  The patient reported shortness of breath during the stress test.  Blood pressure demonstrated a hypertensive response to stress. (215/97).  There was no ST segment deviation noted during stress.  Overall, the patient's exercise capacity was normal. JOSE MARIA%: 0/1.  Left ventricular ejection fraction is normal (Calculated EF = 66%).  Myocardial perfusion imaging indicates a normal myocardial perfusion study with no evidence of ischemia.  Impressions are consistent with a low risk study.    March 2022  TTE   Normal left ventricle systolic function with an estimated ejection fraction    of 55-60%.    There is grade 3 diastolic dysfunction present.    The left atrium is moderately dilated.    Mild mitral regurgitation is present.    Mild tricuspid regurgitation is present.    Estimated pulmonary artery systolic pressure is mildly elevated at 37 mmHg.     MRI  "brain  1. Acute cortical infarction 8 x 4 mm involving the left precentral gyrus     2. Progression of mild cerebral white matter disease, most likely sequela chronic small vessel ischemia. Differential diagnosis also includes but is not limited to: demyelinating disease including multiple sclerosis and Lyme disease, migraine headache   effect, vasculitis, trauma and gliosis.    CTA head neck  1.  No significant stenosis or aneurysm.   2.  No CT correlate for the punctate area of acute ischemia in the left precentral gyrus seen on MRI.   1.  Mild bilateral proximal cervical internal carotid artery stenosis.    ASSESSMENT:   Diagnosis Plan   1. Paroxysmal atrial fibrillation        2. Primary hypertension        3. Diastolic dysfunction with chronic heart failure            PLAN:  Congestive heart failure, diastolic, chronic:  No evidence of volume overload.  Continue regular aerobic exercise.  Afterload well controlled.  We will continue to trend symptoms.    Blood pressure lability: Blood pressures currently reasonably well controlled based on home blood pressure readings.    Hyperlipidemia: Goal LDL less than 100, ideally less than 70.  Continue atorvastatin 20 mg p.o. daily.    Atrial fibrillation, paroxysmal: Diagnosed after CVA in March 2020.  [id number] equal to 7.  Continue Eliquis 5 mg p.o. twice daily.  Continue as needed beta-blockade.  Not recommending antiarrhythmic therapy at this time.        Subjective  Continues to maintain an active lifestyle.   Denies chest pain, palpitations, presyncope, syncope or dyspnea on exertion.  Blood pressures running less than 130/80 mmHg.  She is compliant with medical therapy.    PHYSICAL EXAMINATION:  Vitals:    11/14/24 1143   BP: 144/64   BP Location: Left arm   Patient Position: Sitting   Pulse: 90   SpO2: 98%   Weight: 71.2 kg (157 lb)   Height: 160 cm (63\")     General Appearance:    Alert, cooperative, no distress, appears stated age   Head:    Normocephalic, " without obvious abnormality, atraumatic   Lungs:     Clear to auscultation bilaterally, respirations unlabored   Chest Wall:    No tenderness or deformity    Heart:   Irregularly irregular, S1 and S2 normal, no murmur, rub   or gallop, normal carotid impulse bilaterally without bruit.   Pulses:   2+ and symmetric all extremities   Skin:   Skin color, texture, turgor normal, no rashes or lesions       Diagnostic Data:  Procedures  Lab Results   Component Value Date    CHLPL 167 02/28/2024    TRIG 95 02/28/2024    HDL 67 (H) 02/28/2024     Lab Results   Component Value Date    GLUCOSE 98 12/15/2023    BUN 9 12/15/2023    CREATININE 1.00 12/15/2023     12/15/2023    K 4.2 12/15/2023     12/15/2023    CO2 25.5 12/15/2023     Lab Results   Component Value Date    HGBA1C 5.8 09/04/2024     Lab Results   Component Value Date    WBC 6.8 09/04/2024    HGB 13.9 09/04/2024    HCT 44.3 09/04/2024     09/04/2024       Allergies  No Known Allergies    Current Medications    Current Outpatient Medications:     apixaban (Eliquis) 5 MG tablet tablet, Take 1 tablet by mouth Every 12 (Twelve) Hours., Disp: 60 tablet, Rfl: 11    atorvastatin (LIPITOR) 20 MG tablet, TAKE 1 TABLET BY MOUTH DAILY, Disp: 90 tablet, Rfl: 3    Calcium Citrate (CITRACAL PO), Take 2 tablets by mouth Daily. + Magnesium, Disp: , Rfl:     estradiol (ESTRACE) 0.1 MG/GM vaginal cream, Apply 0.5 g vaginally 3 times weekly at bedtime (Monday, Wednesday, and Friday), Disp: 42.5 g, Rfl: 3    nitrofurantoin, macrocrystal-monohydrate, (Macrobid) 100 MG capsule, Take 1 capsule by mouth 2 (Two) Times a Day., Disp: 14 capsule, Rfl: 0    PARoxetine (PAXIL) 10 MG tablet, Take 1 tablet by mouth Every Morning., Disp: , Rfl:     vitamin D (ERGOCALCIFEROL) 1.25 MG (42140 UT) capsule capsule, Take 1 capsule by mouth 1 (One) Time Per Week., Disp: , Rfl:     ZINC SULFATE PO, Take 1 tablet by mouth Daily., Disp: , Rfl:           ROS  Review of Systems    Cardiovascular:  Positive for dyspnea on exertion. Negative for chest pain, irregular heartbeat and near-syncope.   Respiratory:  Positive for shortness of breath. Negative for cough.        SOCIAL HX  Social History     Socioeconomic History    Marital status:    Tobacco Use    Smoking status: Never     Passive exposure: Never    Smokeless tobacco: Never   Vaping Use    Vaping status: Never Used   Substance and Sexual Activity    Alcohol use: No    Drug use: No    Sexual activity: Defer     Birth control/protection: Post-menopausal       FAMILY HX  Family History   Problem Relation Age of Onset    Hypertension Mother     Colon cancer Father     Colon cancer Paternal Grandmother     Colon cancer Paternal Uncle     Breast cancer Maternal Aunt     Thyroid disease Other         Nephew             Reinaldo Cope III, MD, FACC

## 2024-11-19 ENCOUNTER — HOSPITAL ENCOUNTER (OUTPATIENT)
Facility: HOSPITAL | Age: 79
Discharge: HOME | End: 2024-11-19
Payer: MEDICARE

## 2024-11-19 LAB
FLUAV AG NPH QL: NEGATIVE
FLUBV AG NPH QL: NEGATIVE
SARS-COV-2 RDRP RESP QL NAA+PROBE: NOT DETECTED

## 2024-11-19 PROCEDURE — 87086 URINE CULTURE/COLONY COUNT: CPT

## 2024-11-19 PROCEDURE — 87804 INFLUENZA ASSAY W/OPTIC: CPT

## 2024-11-19 PROCEDURE — 87635 SARS-COV-2 COVID-19 AMP PRB: CPT

## 2024-11-21 LAB — BACTERIA UR CULT: NORMAL

## 2024-11-26 RX ORDER — ATORVASTATIN CALCIUM 20 MG/1
20 TABLET, FILM COATED ORAL DAILY
Qty: 90 TABLET | Refills: 1 | Status: SHIPPED | OUTPATIENT
Start: 2024-11-26

## 2024-12-05 ENCOUNTER — HOSPITAL ENCOUNTER (OUTPATIENT)
Facility: HOSPITAL | Age: 79
Discharge: HOME | End: 2024-12-05
Payer: MEDICARE

## 2024-12-05 LAB
25(OH)D3 SERPL-MCNC: 30.7 NG/ML (ref 32–100)
ALBUMIN SERPL-MCNC: 4.2 G/DL (ref 3.4–4.8)
ALBUMIN/GLOB SERPL: 1.8 {RATIO} (ref 0.8–2)
ALP SERPL-CCNC: 138 U/L (ref 25–100)
ALT SERPL-CCNC: 17 U/L (ref 4–36)
ANION GAP SERPL CALCULATED.3IONS-SCNC: 16 MMOL/L (ref 3–16)
AST SERPL-CCNC: 21 U/L (ref 8–33)
BASOPHILS # BLD: 0 K/UL (ref 0–0.1)
BASOPHILS NFR BLD: 0.5 %
BILIRUB SERPL-MCNC: 0.5 MG/DL (ref 0.3–1.2)
BUN SERPL-MCNC: 12 MG/DL (ref 6–20)
CALCIUM SERPL-MCNC: 9.4 MG/DL (ref 8.5–10.5)
CHLORIDE SERPL-SCNC: 105 MMOL/L (ref 98–107)
CHOLEST SERPL-MCNC: 162 MG/DL (ref 0–200)
CO2 SERPL-SCNC: 23 MMOL/L (ref 20–30)
CREAT SERPL-MCNC: 0.9 MG/DL (ref 0.4–1.2)
EOSINOPHIL # BLD: 0.2 K/UL (ref 0–0.4)
EOSINOPHIL NFR BLD: 2.9 %
ERYTHROCYTE [DISTWIDTH] IN BLOOD BY AUTOMATED COUNT: 13.3 % (ref 11–16)
FOLATE SERPL-MCNC: 11.4 NG/ML
GFR SERPLBLD CREATININE-BSD FMLA CKD-EPI: 65 ML/MIN/{1.73_M2}
GLOBULIN SER CALC-MCNC: 2.4 G/DL
GLUCOSE SERPL-MCNC: 103 MG/DL (ref 74–106)
HCT VFR BLD AUTO: 42.1 % (ref 37–47)
HDLC SERPL-MCNC: 68 MG/DL (ref 40–60)
HGB BLD-MCNC: 13.5 G/DL (ref 11.5–16.5)
IMM GRANULOCYTES # BLD: 0 K/UL
IMM GRANULOCYTES NFR BLD: 0.3 % (ref 0–5)
LDLC SERPL CALC-MCNC: 82 MG/DL
LYMPHOCYTES # BLD: 1.5 K/UL (ref 1.5–4)
LYMPHOCYTES NFR BLD: 23.8 %
MCH RBC QN AUTO: 28.7 PG (ref 27–32)
MCHC RBC AUTO-ENTMCNC: 32.1 G/DL (ref 31–35)
MCV RBC AUTO: 89.6 FL (ref 80–100)
MONOCYTES # BLD: 0.6 K/UL (ref 0.2–0.8)
MONOCYTES NFR BLD: 9 %
NEUTROPHILS # BLD: 3.9 K/UL (ref 2–7.5)
NEUTS SEG NFR BLD: 63.5 %
PLATELET # BLD AUTO: 275 K/UL (ref 150–400)
PMV BLD AUTO: 9.7 FL (ref 6–10)
POTASSIUM SERPL-SCNC: 4.1 MMOL/L (ref 3.4–5.1)
PROT SERPL-MCNC: 6.6 G/DL (ref 6.4–8.3)
RBC # BLD AUTO: 4.7 M/UL (ref 3.8–5.8)
SODIUM SERPL-SCNC: 144 MMOL/L (ref 136–145)
TRIGL SERPL-MCNC: 62 MG/DL (ref 0–249)
TSH SERPL DL<=0.005 MIU/L-ACNC: 3.12 UIU/ML (ref 0.27–4.2)
VIT B12 SERPL-MCNC: 428 PG/ML (ref 211–911)
VLDLC SERPL CALC-MCNC: 12 MG/DL
WBC # BLD AUTO: 6.2 K/UL (ref 4–11)

## 2024-12-05 PROCEDURE — 80061 LIPID PANEL: CPT

## 2024-12-05 PROCEDURE — 82306 VITAMIN D 25 HYDROXY: CPT

## 2024-12-05 PROCEDURE — 82607 VITAMIN B-12: CPT

## 2024-12-05 PROCEDURE — 82746 ASSAY OF FOLIC ACID SERUM: CPT

## 2024-12-05 PROCEDURE — 85025 COMPLETE CBC W/AUTO DIFF WBC: CPT

## 2024-12-05 PROCEDURE — 84443 ASSAY THYROID STIM HORMONE: CPT

## 2024-12-05 PROCEDURE — 80053 COMPREHEN METABOLIC PANEL: CPT

## 2024-12-05 PROCEDURE — 36415 COLL VENOUS BLD VENIPUNCTURE: CPT

## 2025-02-03 ENCOUNTER — HOSPITAL ENCOUNTER (OUTPATIENT)
Dept: ULTRASOUND IMAGING | Facility: HOSPITAL | Age: 80
Discharge: HOME | End: 2025-02-03
Admitting: NURSE PRACTITIONER
Payer: MEDICARE

## 2025-02-03 DIAGNOSIS — N81.11 CYSTOCELE, MIDLINE: ICD-10-CM

## 2025-02-03 PROCEDURE — 76775 US EXAM ABDO BACK WALL LIM: CPT

## 2025-02-10 ENCOUNTER — OFFICE VISIT (OUTPATIENT)
Dept: UROLOGY | Facility: CLINIC | Age: 80
End: 2025-02-10
Payer: MEDICARE

## 2025-02-10 VITALS
TEMPERATURE: 97.2 F | HEART RATE: 70 BPM | SYSTOLIC BLOOD PRESSURE: 140 MMHG | DIASTOLIC BLOOD PRESSURE: 68 MMHG | HEIGHT: 63 IN | OXYGEN SATURATION: 100 % | BODY MASS INDEX: 27.82 KG/M2 | WEIGHT: 157 LBS

## 2025-02-10 DIAGNOSIS — N95.8 GENITOURINARY SYNDROME OF MENOPAUSE: ICD-10-CM

## 2025-02-10 DIAGNOSIS — N81.11 CYSTOCELE, MIDLINE: ICD-10-CM

## 2025-02-10 DIAGNOSIS — R41.3 MEMORY LOSS: Primary | ICD-10-CM

## 2025-02-10 DIAGNOSIS — Z86.73 HISTORY OF STROKE: ICD-10-CM

## 2025-02-10 PROBLEM — I63.9 CEREBROVASCULAR ACCIDENT (CVA): Status: ACTIVE | Noted: 2022-03-18

## 2025-02-10 PROBLEM — G45.9 TRANSIENT ISCHEMIC ATTACK: Status: ACTIVE | Noted: 2025-02-10

## 2025-02-10 RX ORDER — CETIRIZINE HYDROCHLORIDE 10 MG/1
1 TABLET ORAL DAILY
COMMUNITY
Start: 2024-12-06

## 2025-02-10 RX ORDER — LEVOTHYROXINE SODIUM 50 UG/1
1 TABLET ORAL DAILY
COMMUNITY
Start: 2025-01-27

## 2025-02-10 NOTE — PROGRESS NOTES
"       Office Visit     Patient Name: Divine Banuelos  : 1945   MRN: [id number]     Chief Complaint:   Chief Complaint   Patient presents with    Follow-up     Genitourinary syndrome of menopause     Referring Provider: No ref. provider found    Primary Care Provider: Queenie Amezcua APRN     History of Present Illness: Divine Banuelos is a 79 y.o. female presents for follow up with GSM and cystocele to discuss renal ultrasound results.  She reports she was having cramping in her bladder and started her on demand Macrobid from previous visit. She never has dysuria only cramping in her bladder and she had episodes where she will have confusion all day. She states one time last year she went to the gas station and forgot how to pump gas, she could not remember to squeeze the handle. She will have episodes where she is home and all day will be forgetful and \"I'm just out of it.\"    Denies incontinence. Her incontinence was worse when she was using a pessary with GYN and she does not want to return to using a pessary unless needed.     Subjective   Review of System:   As noted in HPI.    Past Medical History:   Diagnosis Date    Anxiety     Arthritis     Atrial fibrillation     Bell's palsy     Bronchitis     Cataract     bilateral    Depression     Diastolic heart failure     srr Dr oCpe note    GERD (gastroesophageal reflux disease)     History of exercise stress test     patient states it was normal    Hyperlipidemia     Hypertension     not currently taking medication for BP    Hypothyroidism     Malignant neoplasm of upper-outer quadrant of right breast in female, estrogen receptor positive 10/30/2020    Mitral valve prolapse     Osteoporosis     Pessary maintenance     Stroke     Wears dentures     upper and lowers     Past Surgical History:   Procedure Laterality Date    BREAST BIOPSY Right 2022    Procedure: BREAST BIOPSY;  Surgeon: Stuart Caceres MD;  Location: [facility];  Service: " General;  Laterality: Right;    BREAST LUMPECTOMY WITH SENTINEL NODE BIOPSY Right 11/4/2020    Procedure: BREAST LUMPECTOMY WITH SENTINEL NODE BIOPSY RIGHT;  Surgeon: Stuart Caceres MD;  Location: [facility];  Service: General;  Laterality: Right;    CATARACT EXTRACTION Bilateral     CATARACT EXTRACTION, BILATERAL      COLONOSCOPY      RETINAL DETACHMENT REPAIR Right      Family History   Problem Relation Age of Onset    Hypertension Mother     Colon cancer Father     Colon cancer Paternal Grandmother     Colon cancer Paternal Uncle     Breast cancer Maternal Aunt     Thyroid disease Other         Nephew     Social History     Socioeconomic History    Marital status:    Tobacco Use    Smoking status: Never     Passive exposure: Never    Smokeless tobacco: Never   Vaping Use    Vaping status: Never Used   Substance and Sexual Activity    Alcohol use: No    Drug use: No    Sexual activity: Defer     Birth control/protection: Post-menopausal       Current Outpatient Medications:     apixaban (Eliquis) 5 MG tablet tablet, Take 1 tablet by mouth Every 12 (Twelve) Hours., Disp: 60 tablet, Rfl: 11    atorvastatin (LIPITOR) 20 MG tablet, Take 1 tablet by mouth Daily., Disp: 90 tablet, Rfl: 1    Calcium Citrate (CITRACAL PO), Take 2 tablets by mouth Daily. + Magnesium, Disp: , Rfl:     cetirizine (zyrTEC) 10 MG tablet, Take 1 tablet by mouth Daily., Disp: , Rfl:     estradiol (ESTRACE) 0.1 MG/GM vaginal cream, Apply 0.5 g vaginally 3 times weekly at bedtime (Monday, Wednesday, and Friday), Disp: 42.5 g, Rfl: 3    levothyroxine (SYNTHROID, LEVOTHROID) 50 MCG tablet, Take 1 tablet by mouth Daily., Disp: , Rfl:     PARoxetine (PAXIL) 10 MG tablet, Take 1 tablet by mouth Every Morning., Disp: , Rfl:     vitamin D (ERGOCALCIFEROL) 1.25 MG (36423 UT) capsule capsule, Take 1 capsule by mouth 1 (One) Time Per Week., Disp: , Rfl:     ZINC SULFATE PO, Take 1 tablet by mouth Daily., Disp: , Rfl:     nitrofurantoin,  "macrocrystal-monohydrate, (Macrobid) 100 MG capsule, Take 1 capsule by mouth 2 (Two) Times a Day. (Patient not taking: Reported on 2/10/2025), Disp: 14 capsule, Rfl: 0    No Known Allergies  Objective   Visit Vitals  /68 (BP Location: Left arm, Patient Position: Sitting, Cuff Size: Adult)   Pulse 70   Temp 97.2 °F (36.2 °C) (Temporal)   Ht 160 cm (62.99\")   Wt 71.2 kg (157 lb)   LMP  (LMP Unknown)   SpO2 100%   BMI 27.82 kg/m²        Body mass index is 27.82 kg/m².     Physical Exam  Vitals and nursing note reviewed.   Constitutional:       General: She is not in acute distress.     Appearance: Normal appearance. She is overweight. She is not ill-appearing.   Pulmonary:      Effort: Pulmonary effort is normal. No respiratory distress.   Skin:     General: Skin is warm and dry.   Neurological:      General: No focal deficit present.      Mental Status: She is alert and oriented to person, place, and time.   Psychiatric:         Mood and Affect: Mood is anxious.         Speech: Speech normal.         Behavior: Behavior normal.         Thought Content: Thought content normal.         Cognition and Memory: Memory is impaired (mild forgetfullness during visit).         Judgment: Judgment normal.          Labs  Lab Results   Component Value Date    COLORU Yellow 11/11/2024    CLARITYU Clear 11/11/2024    SPECGRAV 1.020 11/11/2024    PHUR 5.5 11/11/2024    LEUKOCYTESUR Small (1+) (A) 11/11/2024    NITRITE Positive (A) 11/11/2024    PROTEINPOCUA Negative 11/11/2024    GLUCOSEUR Negative 11/11/2024    KETONESU Negative 11/11/2024    UROBILINOGEN Normal 11/11/2024    BILIRUBINUR Negative 11/11/2024    RBCUR Negative 11/11/2024      Lab Results   Component Value Date    RBCUA 3-5 (A) 05/12/2022    BACTERIA Comment 05/12/2022        Lab Results   Component Value Date    WBC 6.2 12/05/2024    HGB 13.5 12/05/2024    HCT 42.1 12/05/2024    MCV 89.6 12/05/2024     12/05/2024     Lab Results   Component Value Date    " "GLUCOSE 98 12/15/2023    CALCIUM 9.2 12/15/2023     12/15/2023    K 4.2 12/15/2023    CO2 25.5 12/15/2023     12/15/2023    BUN 9 12/15/2023    BUN 15 06/08/2023    CREATININE 1.00 12/15/2023    CREATININE 0.92 06/08/2023    EGFR 57.4 (L) 12/15/2023    EGFR 63.9 06/08/2023    BCR 9.0 12/15/2023    ANIONGAP 11.5 12/15/2023    ALT 11 12/15/2023    AST 13 12/15/2023       Lab Results   Component Value Date    HGBA1C 5.8 09/04/2024       No results found for: \"ATOPOBIUMV\", \"BVAB2\", \"MEGASPHAER\", \"CALBICANSN\", \"CGLABRATAN\", \"CPARAPSILOS\", \"CLUSITANIAE\", \"CKRUSEI\", \"TRICHVAG\", \"CHLAMNAA\", \"NGONORRHON\", \"UREAPLASMA\", \"MYCOPLASMAG\"    Lab Results   Component Value Date    TSH 3.12 12/05/2024    [id number] 428 12/05/2024    [id number] 30.7 (L) 12/05/2024          Radiographic Studies  US Renal Bilateral    Result Date: 2/3/2025  Normal renal ultrasound.      Images were reviewed, interpreted, and dictated by Dr. Theresa Cortes MD Transcribed by Serina Hanley PA-C.  This report was signed and finalized on 2/3/2025 1:37 PM by Theresa Cortes MD.        I have reviewed the above labs and imaging.     Assessment / Plan    Assessment:   Diagnoses and all orders for this visit:    1. Memory loss (Primary)  -     Ambulatory Referral to Neurology    2. History of stroke  -     Ambulatory Referral to Neurology    3. Genitourinary syndrome of menopause    4. Cystocele, midline         Divine Banuelos is a 79 y.o. female here to review renal u/s results and GSM. We reviewed renal ultrasound is negative for hydronephrosis. We had a lengthy discussion that her cultures for the last year have been negative. She had one that was showed mixed genitourinary bacteria and one from early last year showed low levels of bacteria that would not be considered a UTI. Her cramping is likely related to GSM and her episodic confusion and memory loss is not urologic related. I recommend she discuss with her PCP to be evaluated for other " underlying causes and with her history of stroke and TIA I recommend a work up with Neurology. She agrees and would like a referral.   We had an in depth discussion about the risk of topical estradiol pertained to her h/o Breast ca in 2020 and Stroke, current research supports benefits out weight the risk. She would like for me to reach out to Dr. Caceres for his recommendations as well.     Plan:  Referral to Neurology   Discussed with h/o stroke recommend evaluation to r/o other causes of confusion and memory loss episodes  Continue topical periurethral estradiol 2 night weekly         I will reach out to Dr. Caceres to discuss his recommendations on estradiol with patients h/o Breast Ca  Recommend yearly vaginal exams, PVR and renal u/s if she starts to have elevated PVR        Discussed referral to GYN for anterior repair consultation patient will monitor for worsening symptoms and let me know.      I spent a total of 35 minutes with the patient and the chart engaging in data gathering and interpretation, patient interaction, as well as counseling on the risks, benefits, and alternatives of the therapy and coordinating care.      Follow Up:   Return in about 6 months (around 8/10/2025) for Follow up Alon Santoyo.    Yarelis Navarro, MSN, APRN, FNP-C  [facility]

## 2025-02-12 ENCOUNTER — HOSPITAL ENCOUNTER (OUTPATIENT)
Dept: MAMMOGRAPHY | Facility: HOSPITAL | Age: 80
Discharge: HOME | End: 2025-02-12
Payer: MEDICARE

## 2025-02-12 DIAGNOSIS — Z12.31 VISIT FOR SCREENING MAMMOGRAM: ICD-10-CM

## 2025-02-12 PROCEDURE — 77063 BREAST TOMOSYNTHESIS BI: CPT

## 2025-03-17 ENCOUNTER — HOSPITAL ENCOUNTER (OUTPATIENT)
Facility: HOSPITAL | Age: 80
Discharge: HOME | End: 2025-03-17
Payer: MEDICARE

## 2025-03-17 ENCOUNTER — HOSPITAL ENCOUNTER (OUTPATIENT)
Dept: GENERAL RADIOLOGY | Facility: HOSPITAL | Age: 80
Discharge: HOME | End: 2025-03-17
Payer: MEDICARE

## 2025-03-17 DIAGNOSIS — M79.672 LEFT FOOT PAIN: ICD-10-CM

## 2025-03-17 DIAGNOSIS — M25.571 PAIN IN JOINT OF RIGHT FOOT: ICD-10-CM

## 2025-03-17 PROCEDURE — 73630 X-RAY EXAM OF FOOT: CPT

## 2025-03-27 ENCOUNTER — HOSPITAL ENCOUNTER (OUTPATIENT)
Facility: HOSPITAL | Age: 80
Discharge: HOME | End: 2025-03-27
Payer: MEDICARE

## 2025-03-27 LAB
25(OH)D3 SERPL-MCNC: 31.9 NG/ML (ref 32–100)
ALBUMIN SERPL-MCNC: 4.1 G/DL (ref 3.4–4.8)
ALBUMIN/GLOB SERPL: 1.4 {RATIO} (ref 0.8–2)
ALP SERPL-CCNC: 131 U/L (ref 25–100)
ALT SERPL-CCNC: 19 U/L (ref 4–36)
ANION GAP SERPL CALCULATED.3IONS-SCNC: 12 MMOL/L (ref 3–16)
AST SERPL-CCNC: 26 U/L (ref 8–33)
BASOPHILS # BLD: 0 K/UL (ref 0–0.1)
BASOPHILS NFR BLD: 0.4 %
BILIRUB SERPL-MCNC: 0.6 MG/DL (ref 0.3–1.2)
BUN SERPL-MCNC: 19 MG/DL (ref 6–20)
CALCIUM SERPL-MCNC: 9.4 MG/DL (ref 8.3–10.6)
CHLORIDE SERPL-SCNC: 104 MMOL/L (ref 98–107)
CHOLEST SERPL-MCNC: 142 MG/DL (ref 0–200)
CO2 SERPL-SCNC: 23 MMOL/L (ref 20–30)
CREAT SERPL-MCNC: 1 MG/DL (ref 0.4–1.2)
EOSINOPHIL # BLD: 0.1 K/UL (ref 0–0.4)
EOSINOPHIL NFR BLD: 1.4 %
ERYTHROCYTE [DISTWIDTH] IN BLOOD BY AUTOMATED COUNT: 13.8 % (ref 11–16)
FOLATE SERPL-MCNC: 12.1 NG/ML
GFR SERPLBLD CREATININE-BSD FMLA CKD-EPI: 57 ML/MIN/{1.73_M2}
GLOBULIN SER CALC-MCNC: 2.9 G/DL
GLUCOSE SERPL-MCNC: 99 MG/DL (ref 74–106)
HBA1C MFR BLD: 6.1 %
HCT VFR BLD AUTO: 43.8 % (ref 37–47)
HDLC SERPL-MCNC: 66 MG/DL (ref 40–60)
HGB BLD-MCNC: 14 G/DL (ref 11.5–16.5)
IMM GRANULOCYTES # BLD: 0 K/UL
IMM GRANULOCYTES NFR BLD: 0.2 % (ref 0–5)
LDLC SERPL CALC-MCNC: 57 MG/DL
LYMPHOCYTES # BLD: 2 K/UL (ref 1.5–4)
LYMPHOCYTES NFR BLD: 24.4 %
MCH RBC QN AUTO: 29.2 PG (ref 27–32)
MCHC RBC AUTO-ENTMCNC: 32 G/DL (ref 31–35)
MCV RBC AUTO: 91.4 FL (ref 80–100)
MONOCYTES # BLD: 0.8 K/UL (ref 0.2–0.8)
MONOCYTES NFR BLD: 9.6 %
NEUTROPHILS # BLD: 5.2 K/UL (ref 2–7.5)
NEUTS SEG NFR BLD: 64 %
PLATELET # BLD AUTO: 305 K/UL (ref 150–400)
PMV BLD AUTO: 10.1 FL (ref 6–10)
POTASSIUM SERPL-SCNC: 4.3 MMOL/L (ref 3.4–5.1)
PROT SERPL-MCNC: 7 G/DL (ref 6.4–8.3)
RBC # BLD AUTO: 4.79 M/UL (ref 3.8–5.8)
SODIUM SERPL-SCNC: 139 MMOL/L (ref 136–145)
TRIGL SERPL-MCNC: 97 MG/DL (ref 0–249)
TSH SERPL DL<=0.005 MIU/L-ACNC: 2.54 UIU/ML (ref 0.27–4.2)
VIT B12 SERPL-MCNC: 322 PG/ML (ref 211–911)
VLDLC SERPL CALC-MCNC: 19 MG/DL
WBC # BLD AUTO: 8.1 K/UL (ref 4–11)

## 2025-03-27 PROCEDURE — 82607 VITAMIN B-12: CPT

## 2025-03-27 PROCEDURE — 80061 LIPID PANEL: CPT

## 2025-03-27 PROCEDURE — 83036 HEMOGLOBIN GLYCOSYLATED A1C: CPT

## 2025-03-27 PROCEDURE — 84443 ASSAY THYROID STIM HORMONE: CPT

## 2025-03-27 PROCEDURE — 85025 COMPLETE CBC W/AUTO DIFF WBC: CPT

## 2025-03-27 PROCEDURE — 82746 ASSAY OF FOLIC ACID SERUM: CPT

## 2025-03-27 PROCEDURE — 80053 COMPREHEN METABOLIC PANEL: CPT

## 2025-03-27 PROCEDURE — 82306 VITAMIN D 25 HYDROXY: CPT

## 2025-03-28 ENCOUNTER — TRANSCRIBE ORDERS (OUTPATIENT)
Dept: GENERAL RADIOLOGY | Facility: HOSPITAL | Age: 80
End: 2025-03-28

## 2025-03-28 DIAGNOSIS — R41.3 OTHER AMNESIA: Primary | ICD-10-CM

## 2025-03-28 DIAGNOSIS — M54.2 CERVICALGIA: ICD-10-CM

## 2025-04-02 ENCOUNTER — HOSPITAL ENCOUNTER (OUTPATIENT)
Dept: CT IMAGING | Facility: HOSPITAL | Age: 80
Discharge: HOME | End: 2025-04-02
Payer: MEDICARE

## 2025-04-02 DIAGNOSIS — M54.2 CERVICALGIA: ICD-10-CM

## 2025-04-02 DIAGNOSIS — R41.3 OTHER AMNESIA: ICD-10-CM

## 2025-04-02 PROCEDURE — 72125 CT NECK SPINE W/O DYE: CPT

## 2025-04-02 PROCEDURE — 70450 CT HEAD/BRAIN W/O DYE: CPT

## 2025-04-22 ENCOUNTER — OFFICE VISIT (OUTPATIENT)
Age: 80
End: 2025-04-22
Payer: MEDICARE

## 2025-04-22 VITALS
TEMPERATURE: 98.7 F | OXYGEN SATURATION: 96 % | SYSTOLIC BLOOD PRESSURE: 120 MMHG | RESPIRATION RATE: 14 BRPM | DIASTOLIC BLOOD PRESSURE: 72 MMHG | BODY MASS INDEX: 27.82 KG/M2 | HEIGHT: 63 IN | HEART RATE: 71 BPM

## 2025-04-22 DIAGNOSIS — Z86.73 HISTORY OF STROKE: ICD-10-CM

## 2025-04-22 DIAGNOSIS — F41.9 ANXIETY: ICD-10-CM

## 2025-04-22 DIAGNOSIS — G47.10 HYPERSOMNIA: ICD-10-CM

## 2025-04-22 DIAGNOSIS — R41.89 SUBJECTIVE MEMORY COMPLAINTS: Primary | ICD-10-CM

## 2025-04-22 DIAGNOSIS — G31.84 MCI (MILD COGNITIVE IMPAIRMENT): ICD-10-CM

## 2025-04-22 RX ORDER — DONEPEZIL HYDROCHLORIDE 5 MG/1
5 TABLET, FILM COATED ORAL NIGHTLY
Qty: 30 TABLET | Refills: 2 | Status: SHIPPED | OUTPATIENT
Start: 2025-04-22
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
## 2025-05-05 ENCOUNTER — TELEPHONE (OUTPATIENT)
Dept: NEUROLOGY | Facility: CLINIC | Age: 80
End: 2025-05-05
Payer: MEDICARE

## 2025-05-06 ENCOUNTER — LAB (OUTPATIENT)
Dept: LAB | Facility: HOSPITAL | Age: 80
End: 2025-05-06
Payer: MEDICARE

## 2025-05-06 DIAGNOSIS — G31.84 MCI (MILD COGNITIVE IMPAIRMENT): ICD-10-CM

## 2025-05-06 LAB
AMMONIA BLD-SCNC: 25 UMOL/L (ref 11–51)
FOLATE SERPL-MCNC: 12.7 NG/ML (ref 4.78–24.2)
VIT B12 BLD-MCNC: 366 PG/ML (ref 211–946)

## 2025-05-06 PROCEDURE — 83520 IMMUNOASSAY QUANT NOS NONAB: CPT

## 2025-05-06 PROCEDURE — 82607 VITAMIN B-12: CPT

## 2025-05-06 PROCEDURE — 82746 ASSAY OF FOLIC ACID SERUM: CPT

## 2025-05-06 PROCEDURE — 36415 COLL VENOUS BLD VENIPUNCTURE: CPT

## 2025-05-06 PROCEDURE — 86592 SYPHILIS TEST NON-TREP QUAL: CPT

## 2025-05-06 PROCEDURE — 83921 ORGANIC ACID SINGLE QUANT: CPT

## 2025-05-06 PROCEDURE — 82140 ASSAY OF AMMONIA: CPT

## 2025-05-07 LAB — RPR SER QL: NORMAL

## 2025-05-09 LAB
A -- BETA-AMYLOID 42/40 RATIO: 0.1
AL BETA40 PLAS-MCNC: 228.59 PG/ML
AL BETA42 PLAS-MCNC: 23.63 PG/ML
ATN SUMMARY1: ABNORMAL
INFORMATION:: ABNORMAL
NFL CHAIN SERPL IA-MCNC: 3.09 PG/ML (ref 0–9.13)
P-TAU181 PLAS IA-MCNC: 1.93 PG/ML (ref 0–0.97)

## 2025-05-10 LAB — METHYLMALONATE SERPL-SCNC: 1111 NMOL/L (ref 0–378)

## 2025-05-14 ENCOUNTER — HOSPITAL ENCOUNTER (OUTPATIENT)
Dept: SLEEP MEDICINE | Facility: HOSPITAL | Age: 80
Discharge: HOME | End: 2025-05-14
Admitting: NURSE PRACTITIONER
Payer: MEDICARE

## 2025-05-14 DIAGNOSIS — G47.10 HYPERSOMNIA: ICD-10-CM

## 2025-05-14 PROCEDURE — G0399 HOME SLEEP TEST/TYPE 3 PORTA: HCPCS

## 2025-05-20 ENCOUNTER — OFFICE VISIT (OUTPATIENT)
Dept: PSYCHIATRY | Facility: CLINIC | Age: 80
End: 2025-05-20
Payer: MEDICARE

## 2025-05-20 DIAGNOSIS — F43.10 POST TRAUMATIC STRESS DISORDER (PTSD): Primary | ICD-10-CM
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
## 2025-05-21 ENCOUNTER — HOSPITAL ENCOUNTER (OUTPATIENT)
Dept: ULTRASOUND IMAGING | Facility: HOSPITAL | Age: 80
Discharge: HOME | End: 2025-05-21
Admitting: NURSE PRACTITIONER
Payer: MEDICARE

## 2025-05-21 DIAGNOSIS — Z86.73 HISTORY OF STROKE: ICD-10-CM

## 2025-05-21 DIAGNOSIS — G31.84 MCI (MILD COGNITIVE IMPAIRMENT): ICD-10-CM

## 2025-05-21 PROCEDURE — 93880 EXTRACRANIAL BILAT STUDY: CPT

## 2025-05-21 RX ORDER — ATORVASTATIN CALCIUM 20 MG/1
20 TABLET, FILM COATED ORAL DAILY
Qty: 90 TABLET | Refills: 1 | Status: SHIPPED | OUTPATIENT
Start: 2025-05-21

## 2025-06-23 ENCOUNTER — OFFICE VISIT (OUTPATIENT)
Dept: PSYCHIATRY | Facility: CLINIC | Age: 80
End: 2025-06-23
Payer: MEDICARE

## 2025-06-23 DIAGNOSIS — F43.10 POST TRAUMATIC STRESS DISORDER (PTSD): Primary | ICD-10-CM

## 2025-06-23 PROCEDURE — 90837 PSYTX W PT 60 MINUTES: CPT | Performed by: COUNSELOR

## 2025-06-23 PROCEDURE — 1159F MED LIST DOCD IN RCRD: CPT | Performed by: COUNSELOR

## 2025-06-23 PROCEDURE — 1160F RVW MEDS BY RX/DR IN RCRD: CPT | Performed by: COUNSELOR
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
## 2025-07-07 ENCOUNTER — OFFICE VISIT (OUTPATIENT)
Dept: PSYCHIATRY | Facility: CLINIC | Age: 80
End: 2025-07-07
Payer: MEDICARE

## 2025-07-07 DIAGNOSIS — F43.10 POST TRAUMATIC STRESS DISORDER (PTSD): Primary | ICD-10-CM

## 2025-07-07 PROCEDURE — 1159F MED LIST DOCD IN RCRD: CPT | Performed by: COUNSELOR

## 2025-07-07 PROCEDURE — 90837 PSYTX W PT 60 MINUTES: CPT | Performed by: COUNSELOR

## 2025-07-07 PROCEDURE — 1160F RVW MEDS BY RX/DR IN RCRD: CPT | Performed by: COUNSELOR
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
## 2025-07-07 NOTE — PROGRESS NOTES
"  Follow Up Note       Date Encounter: 2025   Name: Divine Banuelos  MRN: [id number]  : 1945    Time In: 1:38 PM  Time Out: 2:40 PM     Referring Provider: Queenie Amezcua APRN    Chief Complaint: (F43.10) Post traumatic stress disorder (PTSD)     History of Present Illness:   Divine Banuelos is a 79 y.o. female who is being seen today for follow up counseling. Patient presents to session stating she continues to clean and purge her house. She reports she continues to be free of nightmares, and is feeling \"much better\" since purging her home. She reports she has been less easily startled. She notes she's not screamed in the last couple of weeks, when unexpectedly touched, except once this past weekend. She identifies this is overall less. Patient reports she's been having sleep episodes, where she will sit down and go to sleep without warning. She's done this in the vehicle, but it's mostly been during a time of when the car is parked. Prior to [facility], she would fall asleep at stop lights and horns honking would wake her up.     Subjective     Patient's Support Network Includes:  children    Medications:     Current Outpatient Medications:   •  apixaban (Eliquis) 5 MG tablet tablet, Take 1 tablet by mouth Every 12 (Twelve) Hours., Disp: 60 tablet, Rfl: 11  •  atorvastatin (LIPITOR) 20 MG tablet, Take 1 tablet by mouth Daily., Disp: 90 tablet, Rfl: 1  •  Calcium Citrate (CITRACAL PO), Take 2 tablets by mouth Daily. + Magnesium, Disp: , Rfl:   •  cetirizine (zyrTEC) 10 MG tablet, Take 1 tablet by mouth Daily. (Patient not taking: Reported on 2025), Disp: , Rfl:   •  Cranberry 50 MG chewable tablet, Chew., Disp: , Rfl:   •  donepezil (Aricept) 5 MG tablet, Take 1 tablet by mouth Every Night., Disp: 30 tablet, Rfl: 2  •  Elderberry 500 MG capsule, Take  by mouth., Disp: , Rfl:   •  estradiol (ESTRACE) 0.1 MG/GM vaginal cream, Apply 0.5 g vaginally 3 times weekly at bedtime (Monday, Wednesday, " and Friday), Disp: 42.5 g, Rfl: 3  •  levothyroxine (SYNTHROID, LEVOTHROID) 50 MCG tablet, Take 1 tablet by mouth Daily., Disp: , Rfl:   •  nitrofurantoin, macrocrystal-monohydrate, (Macrobid) 100 MG capsule, Take 1 capsule by mouth 2 (Two) Times a Day. (Patient not taking: Reported on 2/10/2025), Disp: 14 capsule, Rfl: 0  •  PARoxetine (PAXIL) 10 MG tablet, Take 1 tablet by mouth Every Morning., Disp: , Rfl:   •  vitamin D (ERGOCALCIFEROL) 1.25 MG (93591 UT) capsule capsule, Take 1 capsule by mouth 1 (One) Time Per Week., Disp: , Rfl:   •  ZINC SULFATE PO, Take 1 tablet by mouth Daily., Disp: , Rfl:     Allergies:   No Known Allergies     Objective       MENTAL STATUS EXAM   General Appearance:  Cleanly groomed and dressed  Eye Contact:  Good eye contact  Attitude:  Cooperative  Motor Activity:  Normal gait, posture  Muscle Strength:  Normal  Speech:  Normal rate, tone, volume  Language:  Spontaneous  Mood and affect:  Normal, pleasant, happy and appropriate  Thought Process:  Logical and goal-directed  Associations/ Thought Content:  No delusions  Hallucinations:  None  Suicidal Ideations:  Not present  Homicidal Ideation:  Not present  Sensorium:  Alert  Orientation:  Person, place, time and situation  Immediate Recall, Recent, and Remote Memory:  Intact  Attention Span/ Concentration:  Good  Fund of Knowledge:  Appropriate for age and educational level  Intellectual Functioning:  Average range  Insight:  Fair  Judgement:  Good  Reliability:  Good  Impulse Control:  Good    Assessment / Plan      Visit Diagnosis/Orders Placed This Visit:    ICD-10-CM ICD-9-CM   1. Post traumatic stress disorder (PTSD)  F43.10 309.81        PLAN: Continue psychotherapy     Prognosis: Good with ongoing treatment    Safety: No acute safety concerns.  Therapist will continue to monitor.   Assisted Patient in identifying risk factors which would indicate the need for higher level of care including thoughts to harm self or others  and/or self-harming behavior and encouraged Patient to contact this office, text/call [phone], call 911, or present to the nearest emergency room should any of these events occur. Discussed crisis intervention services and means to access. Patient adamantly and convincingly denies current suicidal or homicidal ideation or perceptual disturbance.  Risk Assessment: Risk of self-harm acutely is low. Risk of self-harm chronically is also low, but could be further elevated in the event of treatment noncompliance and/or AODA.    Treatment Plan/Goals: Continue supportive psychotherapy efforts and medications as indicated. Patient acknowledged and verbally consented to continue with current treatment plan and was educated on the importance of compliance with treatment and follow-up appointments. Patient seems reasonably able to adhere to treatment plan.      Allowed Patient to freely discuss issues  without interruption or judgement with unconditional positive regard, active listening skills, and empathy. Therapist provided a safe, confidential environment to facilitate the development of a positive therapeutic relationship and encouraged open, honest communication. Assisted Patient in processing session content; acknowledged and normalized Patient’s thoughts, feelings, and concerns. Assisted patient with rationalizing any unhelpful thought processes and/or thinking styles utilizing Cognitive Behavioral Therapy approach. Discussed implementing a better night routine consisting of less screen time and going to bed earlier. Provided patient with sleep diary chart to complete before next session. Encouraged patient to communicate with her providers (PCP and/or Neurology) about her uncontrolled sleep episodes. She verbalizes understanding and agreement.     Assignments: Sleep Diary Chart    Follow Up:   Return in about 3 weeks (around 7/28/2025).     ANGÉLICA Akers  July 7, 2025 14:51 EDT    Part of this note may be an  electronic transcription/translation of spoken language to printed text using the Dragon Dictation System.

## 2025-07-20 DIAGNOSIS — G31.84 MCI (MILD COGNITIVE IMPAIRMENT): ICD-10-CM

## 2025-07-22 RX ORDER — DONEPEZIL HYDROCHLORIDE 5 MG/1
5 TABLET, FILM COATED ORAL NIGHTLY
Qty: 30 TABLET | Refills: 0 | Status: SHIPPED | OUTPATIENT
Start: 2025-07-22

## 2025-07-23 RX ORDER — DONEPEZIL HYDROCHLORIDE 5 MG/1
5 TABLET, FILM COATED ORAL NIGHTLY
Qty: 30 TABLET | Refills: 2 | Status: SHIPPED | OUTPATIENT
Start: 2025-07-23

## 2025-08-04 ENCOUNTER — OFFICE VISIT (OUTPATIENT)
Dept: NEUROLOGY | Facility: CLINIC | Age: 80
End: 2025-08-04
Payer: MEDICARE

## 2025-08-04 VITALS
WEIGHT: 160.8 LBS | HEART RATE: 72 BPM | DIASTOLIC BLOOD PRESSURE: 60 MMHG | SYSTOLIC BLOOD PRESSURE: 134 MMHG | HEIGHT: 63 IN | TEMPERATURE: 98.7 F | RESPIRATION RATE: 14 BRPM | OXYGEN SATURATION: 98 % | BODY MASS INDEX: 28.49 KG/M2

## 2025-08-04 DIAGNOSIS — F41.9 ANXIETY: ICD-10-CM

## 2025-08-04 DIAGNOSIS — G47.10 HYPERSOMNIA: ICD-10-CM

## 2025-08-04 DIAGNOSIS — G31.84 MCI (MILD COGNITIVE IMPAIRMENT): Primary | ICD-10-CM

## 2025-08-05 ENCOUNTER — OFFICE VISIT (OUTPATIENT)
Dept: PSYCHIATRY | Facility: CLINIC | Age: 80
End: 2025-08-05
Payer: MEDICARE

## 2025-08-05 DIAGNOSIS — F43.10 POST TRAUMATIC STRESS DISORDER (PTSD): Primary | ICD-10-CM

## 2025-08-11 ENCOUNTER — OFFICE VISIT (OUTPATIENT)
Dept: UROLOGY | Facility: CLINIC | Age: 80
End: 2025-08-11
Payer: MEDICARE

## 2025-08-11 VITALS
BODY MASS INDEX: 28.35 KG/M2 | OXYGEN SATURATION: 95 % | TEMPERATURE: 98.7 F | WEIGHT: 160 LBS | SYSTOLIC BLOOD PRESSURE: 152 MMHG | HEART RATE: 96 BPM | DIASTOLIC BLOOD PRESSURE: 85 MMHG | HEIGHT: 63 IN

## 2025-08-11 DIAGNOSIS — N81.11 CYSTOCELE, MIDLINE: ICD-10-CM

## 2025-08-11 DIAGNOSIS — N95.8 GENITOURINARY SYNDROME OF MENOPAUSE: Primary | ICD-10-CM

## 2025-08-11 PROCEDURE — G2211 COMPLEX E/M VISIT ADD ON: HCPCS | Performed by: NURSE PRACTITIONER

## 2025-08-11 PROCEDURE — 99213 OFFICE O/P EST LOW 20 MIN: CPT | Performed by: NURSE PRACTITIONER

## 2025-08-11 PROCEDURE — 3079F DIAST BP 80-89 MM HG: CPT | Performed by: NURSE PRACTITIONER

## 2025-08-11 PROCEDURE — 51798 US URINE CAPACITY MEASURE: CPT | Performed by: NURSE PRACTITIONER

## 2025-08-11 PROCEDURE — 3077F SYST BP >= 140 MM HG: CPT | Performed by: NURSE PRACTITIONER

## 2025-08-11 PROCEDURE — 1159F MED LIST DOCD IN RCRD: CPT | Performed by: NURSE PRACTITIONER

## 2025-08-11 PROCEDURE — 1160F RVW MEDS BY RX/DR IN RCRD: CPT | Performed by: NURSE PRACTITIONER

## (undated) DEVICE — STRIP,CLOSURE,WOUND,MEDI-STRIP,1/2X4: Brand: MEDLINE

## (undated) DEVICE — CUFF SCD HEMOFORCE SEQ CALF STD MD

## (undated) DEVICE — CVR TRANSD CIV FLX TPR 11.9 TO 3.8X61CM

## (undated) DEVICE — GLV SURG SENSICARE W/ALOE PF LF 7.5 STRL

## (undated) DEVICE — UNDYED BRAIDED (POLYGLACTIN 910), SYNTHETIC ABSORBABLE SUTURE: Brand: COATED VICRYL

## (undated) DEVICE — FLEXIBLE YANKAUER,MEDIUM TIP, NO VACUUM CONTROL: Brand: ARGYLE

## (undated) DEVICE — RICH MAJOR PROCEDURE: Brand: MEDLINE INDUSTRIES, INC.

## (undated) DEVICE — COVER,MAYO STAND,STERILE: Brand: MEDLINE

## (undated) DEVICE — NDL HYPO ECLPS SFTY 25G 1 1/2IN

## (undated) DEVICE — SYR LL TP 10ML STRL

## (undated) DEVICE — SUT SILK 2/0 SH 30IN K833H

## (undated) DEVICE — SHEET,DRAPE,70X100,STERILE: Brand: MEDLINE

## (undated) DEVICE — ANTIBACTERIAL UNDYED BRAIDED (POLYGLACTIN 910), SYNTHETIC ABSORBABLE SUTURE: Brand: COATED VICRYL

## (undated) DEVICE — TBG PENCL TELESCP MEGADYNE SMOKE EVAC 10FT

## (undated) DEVICE — SUT VIC 3/0 SH 36IN J527H

## (undated) DEVICE — DRSNG WND GZ PAD BORDERED LF 4X5IN STRL

## (undated) DEVICE — COVADERM: Brand: DEROYAL

## (undated) DEVICE — DRSNG SPNG GZ WOVN 8PLY 4X4IN 2PK LF STRL BX/50PK